# Patient Record
Sex: FEMALE | Race: WHITE | NOT HISPANIC OR LATINO | Employment: OTHER | URBAN - METROPOLITAN AREA
[De-identification: names, ages, dates, MRNs, and addresses within clinical notes are randomized per-mention and may not be internally consistent; named-entity substitution may affect disease eponyms.]

---

## 2017-03-02 RX ORDER — LEVOTHYROXINE SODIUM 0.03 MG/1
25 TABLET ORAL EVERY MORNING
COMMUNITY

## 2017-03-02 RX ORDER — MELOXICAM 7.5 MG/1
7.5 TABLET ORAL AS NEEDED
Status: ON HOLD | COMMUNITY
End: 2017-11-20

## 2017-03-03 ENCOUNTER — ANESTHESIA EVENT (OUTPATIENT)
Dept: GASTROENTEROLOGY | Facility: AMBULARY SURGERY CENTER | Age: 72
End: 2017-03-03
Payer: MEDICARE

## 2017-03-06 ENCOUNTER — ANESTHESIA (OUTPATIENT)
Dept: GASTROENTEROLOGY | Facility: AMBULARY SURGERY CENTER | Age: 72
End: 2017-03-06
Payer: MEDICARE

## 2017-03-06 ENCOUNTER — HOSPITAL ENCOUNTER (OUTPATIENT)
Facility: AMBULARY SURGERY CENTER | Age: 72
Setting detail: OUTPATIENT SURGERY
Discharge: HOME | End: 2017-03-06
Attending: INTERNAL MEDICINE | Admitting: INTERNAL MEDICINE
Payer: MEDICARE

## 2017-03-06 VITALS
WEIGHT: 180 LBS | OXYGEN SATURATION: 98 % | RESPIRATION RATE: 18 BRPM | HEART RATE: 65 BPM | BODY MASS INDEX: 33.13 KG/M2 | SYSTOLIC BLOOD PRESSURE: 140 MMHG | HEIGHT: 62 IN | TEMPERATURE: 98.1 F | DIASTOLIC BLOOD PRESSURE: 71 MMHG

## 2017-03-06 DIAGNOSIS — K21.9 GASTRO-ESOPHAGEAL REFLUX DISEASE WITHOUT ESOPHAGITIS: ICD-10-CM

## 2017-03-06 DIAGNOSIS — Z86.010 HISTORY OF COLONIC POLYPS: ICD-10-CM

## 2017-03-06 PROCEDURE — 88305 TISSUE EXAM BY PATHOLOGIST: CPT | Performed by: INTERNAL MEDICINE

## 2017-03-06 PROCEDURE — 88342 IMHCHEM/IMCYTCHM 1ST ANTB: CPT | Performed by: INTERNAL MEDICINE

## 2017-03-06 RX ORDER — FENTANYL CITRATE 50 UG/ML
INJECTION, SOLUTION INTRAMUSCULAR; INTRAVENOUS AS NEEDED
Status: DISCONTINUED | OUTPATIENT
Start: 2017-03-06 | End: 2017-03-06 | Stop reason: SURG

## 2017-03-06 RX ORDER — SODIUM CHLORIDE, SODIUM LACTATE, POTASSIUM CHLORIDE, CALCIUM CHLORIDE 600; 310; 30; 20 MG/100ML; MG/100ML; MG/100ML; MG/100ML
100 INJECTION, SOLUTION INTRAVENOUS CONTINUOUS
Status: CANCELLED | OUTPATIENT
Start: 2017-03-06

## 2017-03-06 RX ORDER — SODIUM CHLORIDE, SODIUM LACTATE, POTASSIUM CHLORIDE, CALCIUM CHLORIDE 600; 310; 30; 20 MG/100ML; MG/100ML; MG/100ML; MG/100ML
100 INJECTION, SOLUTION INTRAVENOUS CONTINUOUS
Status: DISCONTINUED | OUTPATIENT
Start: 2017-03-06 | End: 2017-03-06 | Stop reason: HOSPADM

## 2017-03-06 RX ORDER — PROPOFOL 10 MG/ML
INJECTION, EMULSION INTRAVENOUS AS NEEDED
Status: DISCONTINUED | OUTPATIENT
Start: 2017-03-06 | End: 2017-03-06 | Stop reason: SURG

## 2017-03-06 RX ADMIN — PROPOFOL 20 MG: 10 INJECTION, EMULSION INTRAVENOUS at 09:38

## 2017-03-06 RX ADMIN — PROPOFOL 20 MG: 10 INJECTION, EMULSION INTRAVENOUS at 09:47

## 2017-03-06 RX ADMIN — PROPOFOL 30 MG: 10 INJECTION, EMULSION INTRAVENOUS at 09:42

## 2017-03-06 RX ADMIN — FENTANYL CITRATE 50 MCG: 50 INJECTION, SOLUTION INTRAMUSCULAR; INTRAVENOUS at 09:47

## 2017-03-06 RX ADMIN — PROPOFOL 30 MG: 10 INJECTION, EMULSION INTRAVENOUS at 09:40

## 2017-03-06 RX ADMIN — LIDOCAINE HYDROCHLORIDE 50 MG: 20 INJECTION, SOLUTION INTRAVENOUS at 09:34

## 2017-03-06 RX ADMIN — FENTANYL CITRATE 50 MCG: 50 INJECTION, SOLUTION INTRAMUSCULAR; INTRAVENOUS at 09:34

## 2017-03-06 RX ADMIN — PROPOFOL 20 MG: 10 INJECTION, EMULSION INTRAVENOUS at 09:36

## 2017-03-06 RX ADMIN — PROPOFOL 20 MG: 10 INJECTION, EMULSION INTRAVENOUS at 09:53

## 2017-03-06 RX ADMIN — PROPOFOL 20 MG: 10 INJECTION, EMULSION INTRAVENOUS at 09:50

## 2017-03-06 RX ADMIN — SODIUM CHLORIDE, SODIUM LACTATE, POTASSIUM CHLORIDE, AND CALCIUM CHLORIDE 100 ML/HR: .6; .31; .03; .02 INJECTION, SOLUTION INTRAVENOUS at 09:04

## 2017-03-06 RX ADMIN — PROPOFOL 80 MG: 10 INJECTION, EMULSION INTRAVENOUS at 09:34

## 2017-03-06 RX ADMIN — PROPOFOL 20 MG: 10 INJECTION, EMULSION INTRAVENOUS at 09:56

## 2017-03-06 RX ADMIN — PROPOFOL 20 MG: 10 INJECTION, EMULSION INTRAVENOUS at 09:44

## 2017-07-10 ENCOUNTER — APPOINTMENT (OUTPATIENT)
Dept: LAB | Facility: HOSPITAL | Age: 72
End: 2017-07-10
Attending: INTERNAL MEDICINE
Payer: MEDICARE

## 2017-07-10 ENCOUNTER — TRANSCRIBE ORDERS (OUTPATIENT)
Dept: ADMINISTRATIVE | Facility: HOSPITAL | Age: 72
End: 2017-07-10

## 2017-07-10 DIAGNOSIS — N39.0 URINARY TRACT INFECTION, SITE NOT SPECIFIED: ICD-10-CM

## 2017-07-10 DIAGNOSIS — E55.9 UNSPECIFIED VITAMIN D DEFICIENCY: ICD-10-CM

## 2017-07-10 DIAGNOSIS — I10 ESSENTIAL HYPERTENSION, MALIGNANT: ICD-10-CM

## 2017-07-10 DIAGNOSIS — E03.9 UNSPECIFIED HYPOTHYROIDISM: ICD-10-CM

## 2017-07-10 DIAGNOSIS — D64.9 ANEMIA, UNSPECIFIED: Primary | ICD-10-CM

## 2017-07-10 DIAGNOSIS — E78.00 PURE HYPERCHOLESTEROLEMIA: ICD-10-CM

## 2017-07-10 DIAGNOSIS — D64.9 ANEMIA, UNSPECIFIED: ICD-10-CM

## 2017-07-10 LAB
25(OH)D3 SERPL-MCNC: 39.8 NG/ML (ref 30–100)
ALBUMIN SERPL BCP-MCNC: 3.4 G/DL (ref 3.5–5)
ALP SERPL-CCNC: 70 U/L (ref 46–116)
ALT SERPL W P-5'-P-CCNC: 21 U/L (ref 12–78)
ANION GAP SERPL CALCULATED.3IONS-SCNC: 8 MMOL/L (ref 4–13)
AST SERPL W P-5'-P-CCNC: 18 U/L (ref 5–45)
BACTERIA UR QL AUTO: ABNORMAL /HPF
BASOPHILS # BLD AUTO: 0 THOUSANDS/ΜL (ref 0–0.1)
BASOPHILS NFR BLD AUTO: 1 % (ref 0–1)
BILIRUB SERPL-MCNC: 0.5 MG/DL (ref 0.2–1)
BILIRUB UR QL STRIP: NEGATIVE
BUN SERPL-MCNC: 25 MG/DL (ref 5–25)
CALCIUM SERPL-MCNC: 8.5 MG/DL (ref 8.3–10.1)
CHLORIDE SERPL-SCNC: 104 MMOL/L (ref 100–108)
CHOLEST SERPL-MCNC: 162 MG/DL (ref 50–200)
CLARITY UR: ABNORMAL
CO2 SERPL-SCNC: 27 MMOL/L (ref 21–32)
COLOR UR: YELLOW
CREAT SERPL-MCNC: 0.99 MG/DL (ref 0.6–1.3)
EOSINOPHIL # BLD AUTO: 0.2 THOUSAND/ΜL (ref 0–0.61)
EOSINOPHIL NFR BLD AUTO: 3 % (ref 0–6)
ERYTHROCYTE [DISTWIDTH] IN BLOOD BY AUTOMATED COUNT: 13.4 % (ref 11.6–15.1)
EST. AVERAGE GLUCOSE BLD GHB EST-MCNC: 120 MG/DL
GFR SERPL CREATININE-BSD FRML MDRD: 55.5 ML/MIN/1.73SQ M
GLUCOSE P FAST SERPL-MCNC: 95 MG/DL (ref 65–99)
GLUCOSE UR STRIP-MCNC: NEGATIVE MG/DL
HBA1C MFR BLD: 5.8 % (ref 4.2–6.3)
HCT VFR BLD AUTO: 43.4 % (ref 37–47)
HDLC SERPL-MCNC: 32 MG/DL (ref 40–60)
HGB BLD-MCNC: 14.7 G/DL (ref 12–16)
HGB UR QL STRIP.AUTO: ABNORMAL
KETONES UR STRIP-MCNC: NEGATIVE MG/DL
LDLC SERPL CALC-MCNC: 108 MG/DL (ref 0–100)
LEUKOCYTE ESTERASE UR QL STRIP: ABNORMAL
LYMPHOCYTES # BLD AUTO: 2.2 THOUSANDS/ΜL (ref 0.6–4.47)
LYMPHOCYTES NFR BLD AUTO: 39 % (ref 14–44)
MCH RBC QN AUTO: 31.4 PG (ref 27–31)
MCHC RBC AUTO-ENTMCNC: 33.9 G/DL (ref 31.4–37.4)
MCV RBC AUTO: 92 FL (ref 82–98)
MONOCYTES # BLD AUTO: 0.5 THOUSAND/ΜL (ref 0.17–1.22)
MONOCYTES NFR BLD AUTO: 9 % (ref 4–12)
MUCOUS THREADS UR QL AUTO: ABNORMAL
NEUTROPHILS # BLD AUTO: 2.7 THOUSANDS/ΜL (ref 1.85–7.62)
NEUTS SEG NFR BLD AUTO: 48 % (ref 43–75)
NITRITE UR QL STRIP: NEGATIVE
NON-SQ EPI CELLS URNS QL MICRO: ABNORMAL /HPF
NRBC BLD AUTO-RTO: 0 /100 WBCS
PH UR STRIP.AUTO: 5 [PH] (ref 5–9)
PLATELET # BLD AUTO: 250 THOUSANDS/UL (ref 130–400)
PMV BLD AUTO: 7.5 FL (ref 8.9–12.7)
POTASSIUM SERPL-SCNC: 3.9 MMOL/L (ref 3.5–5.3)
PROT SERPL-MCNC: 6.5 G/DL (ref 6.4–8.2)
PROT UR STRIP-MCNC: NEGATIVE MG/DL
RBC # BLD AUTO: 4.69 MILLION/UL (ref 4.2–5.4)
RBC #/AREA URNS AUTO: ABNORMAL /HPF
SODIUM SERPL-SCNC: 139 MMOL/L (ref 136–145)
SP GR UR STRIP.AUTO: 1.02 (ref 1–1.03)
TRIGL SERPL-MCNC: 112 MG/DL
TSH SERPL DL<=0.05 MIU/L-ACNC: 2.35 UIU/ML (ref 0.36–3.74)
UROBILINOGEN UR QL STRIP.AUTO: 0.2 E.U./DL
WBC # BLD AUTO: 5.7 THOUSAND/UL (ref 4.8–10.8)
WBC #/AREA URNS AUTO: ABNORMAL /HPF

## 2017-07-10 PROCEDURE — 82306 VITAMIN D 25 HYDROXY: CPT

## 2017-07-10 PROCEDURE — 81001 URINALYSIS AUTO W/SCOPE: CPT | Performed by: INTERNAL MEDICINE

## 2017-07-10 PROCEDURE — 80053 COMPREHEN METABOLIC PANEL: CPT | Performed by: INTERNAL MEDICINE

## 2017-07-10 PROCEDURE — 85025 COMPLETE CBC W/AUTO DIFF WBC: CPT

## 2017-07-10 PROCEDURE — 83036 HEMOGLOBIN GLYCOSYLATED A1C: CPT | Performed by: INTERNAL MEDICINE

## 2017-07-10 PROCEDURE — 84443 ASSAY THYROID STIM HORMONE: CPT | Performed by: INTERNAL MEDICINE

## 2017-07-10 PROCEDURE — 36415 COLL VENOUS BLD VENIPUNCTURE: CPT | Performed by: INTERNAL MEDICINE

## 2017-07-10 PROCEDURE — 80061 LIPID PANEL: CPT | Performed by: INTERNAL MEDICINE

## 2017-09-21 ENCOUNTER — TRANSCRIBE ORDERS (OUTPATIENT)
Dept: ADMINISTRATIVE | Facility: HOSPITAL | Age: 72
End: 2017-09-21

## 2017-09-21 DIAGNOSIS — Z12.31 VISIT FOR SCREENING MAMMOGRAM: Primary | ICD-10-CM

## 2017-09-21 DIAGNOSIS — N39.0 URINARY TRACT INFECTION, SITE NOT SPECIFIED: ICD-10-CM

## 2017-09-22 ENCOUNTER — APPOINTMENT (OUTPATIENT)
Dept: LAB | Facility: HOSPITAL | Age: 72
End: 2017-09-22
Attending: INTERNAL MEDICINE
Payer: MEDICARE

## 2017-09-22 ENCOUNTER — HOSPITAL ENCOUNTER (OUTPATIENT)
Dept: RADIOLOGY | Facility: HOSPITAL | Age: 72
Discharge: HOME | End: 2017-09-22
Attending: INTERNAL MEDICINE
Payer: MEDICARE

## 2017-09-22 DIAGNOSIS — Z12.31 VISIT FOR SCREENING MAMMOGRAM: ICD-10-CM

## 2017-09-22 DIAGNOSIS — N39.0 URINARY TRACT INFECTION, SITE NOT SPECIFIED: ICD-10-CM

## 2017-09-22 LAB
BACTERIA UR QL AUTO: ABNORMAL /HPF
BILIRUB UR QL STRIP: NEGATIVE
CLARITY UR: CLEAR
COLOR UR: YELLOW
GLUCOSE UR STRIP-MCNC: NEGATIVE MG/DL
HGB UR QL STRIP.AUTO: ABNORMAL
KETONES UR STRIP-MCNC: NEGATIVE MG/DL
LEUKOCYTE ESTERASE UR QL STRIP: NEGATIVE
NITRITE UR QL STRIP: NEGATIVE
NON-SQ EPI CELLS URNS QL MICRO: ABNORMAL /HPF
PH UR STRIP.AUTO: 5.5 [PH] (ref 5–9)
PROT UR STRIP-MCNC: NEGATIVE MG/DL
RBC #/AREA URNS AUTO: ABNORMAL /HPF
SP GR UR STRIP.AUTO: 1.02 (ref 1–1.03)
UROBILINOGEN UR QL STRIP.AUTO: 0.2 E.U./DL
WBC #/AREA URNS AUTO: ABNORMAL /HPF

## 2017-09-22 PROCEDURE — G0202 SCR MAMMO BI INCL CAD: HCPCS

## 2017-09-22 PROCEDURE — 81001 URINALYSIS AUTO W/SCOPE: CPT

## 2017-09-27 ENCOUNTER — HOSPITAL ENCOUNTER (OUTPATIENT)
Dept: RADIOLOGY | Facility: HOSPITAL | Age: 72
Discharge: HOME | End: 2017-09-27
Attending: INTERNAL MEDICINE
Payer: MEDICARE

## 2017-09-27 DIAGNOSIS — R92.8 ABNORMAL SCREENING MAMMOGRAM: ICD-10-CM

## 2017-09-27 PROCEDURE — 76642 ULTRASOUND BREAST LIMITED: CPT

## 2017-09-27 PROCEDURE — G0206 DX MAMMO INCL CAD UNI: HCPCS

## 2017-10-23 ENCOUNTER — TRANSCRIBE ORDERS (OUTPATIENT)
Dept: ADMINISTRATIVE | Facility: HOSPITAL | Age: 72
End: 2017-10-23

## 2017-10-23 DIAGNOSIS — M25.561 ACUTE PAIN OF RIGHT KNEE: Primary | ICD-10-CM

## 2017-10-23 DIAGNOSIS — M25.562 ACUTE PAIN OF LEFT KNEE: ICD-10-CM

## 2017-10-31 ENCOUNTER — HOSPITAL ENCOUNTER (OUTPATIENT)
Dept: RADIOLOGY | Facility: HOSPITAL | Age: 72
Discharge: HOME | End: 2017-10-31
Attending: INTERNAL MEDICINE
Payer: MEDICARE

## 2017-10-31 DIAGNOSIS — M25.562 ACUTE PAIN OF LEFT KNEE: ICD-10-CM

## 2017-10-31 PROCEDURE — 73721 MRI JNT OF LWR EXTRE W/O DYE: CPT

## 2017-11-08 ENCOUNTER — ALLSCRIPTS OFFICE VISIT (OUTPATIENT)
Dept: OTHER | Facility: OTHER | Age: 72
End: 2017-11-08

## 2017-11-08 ENCOUNTER — APPOINTMENT (OUTPATIENT)
Dept: RADIOLOGY | Facility: CLINIC | Age: 72
End: 2017-11-08
Payer: MEDICARE

## 2017-11-08 DIAGNOSIS — M17.12 PRIMARY OSTEOARTHRITIS OF LEFT KNEE: ICD-10-CM

## 2017-11-08 DIAGNOSIS — S83.232A COMPLEX TEAR OF MEDIAL MENISCUS OF LEFT KNEE AS CURRENT INJURY: ICD-10-CM

## 2017-11-08 DIAGNOSIS — S83.282A OTHER TEAR OF LATERAL MENISCUS, CURRENT INJURY, LEFT KNEE, INITIAL ENCOUNTER: ICD-10-CM

## 2017-11-08 DIAGNOSIS — M25.562 PAIN IN LEFT KNEE: ICD-10-CM

## 2017-11-08 PROCEDURE — 73562 X-RAY EXAM OF KNEE 3: CPT

## 2017-11-10 NOTE — PROGRESS NOTES
Assessment    1  Left knee pain (719 46) (M25 562)   2  Complex tear of medial meniscus of left knee as current injury, initial encounter (836 0) (D80 120O)   3  Other tear of lateral meniscus of left knee as current injury, initial encounter (836 1) (O52 356I)   4  Primary osteoarthritis of left knee (715 16) (M17 12)    Plan  Complex tear of medial meniscus of left knee as current injury, initial encounter, Leftknee pain, Other tear of lateral meniscus of left knee as current injury, initial encounter,Primary osteoarthritis of left knee    · *1 - [facility]  *  Status: Active - Retrospective By ProtocolAuthorization  Requested for: [id number]  Care Summary provided  : Yes  Complex tear of medial meniscus of left knee as current injury, initial encounter, Primaryosteoarthritis of left knee    · Kenalog 40 MG/ML Injection Suspension (Triamcinolone Acetonide)  Left knee pain    · * XR KNEE 3 VW LEFT NON INJURY; Status:Active; Requested for:08Nov2017;     Discussion/Summary    Madison Burkett is a very pleasant 30-year-old female that presents today with intermittent left knee pain  After thorough history, clinical exam, review of her MRI and x-ray I feel that her activity related left knee pain and her pain complaints her to do more so to her underlying moderate osteoarthritis of her knee rather than her medial and lateral meniscus tears  She denies any pinpoint pain and denies any mechanical symptoms such as locking or catching  We discussed treatment options both surgical and nonsurgical for these complaints and I recommended that she do not undergo surgical arthroscopy as this will not adequately address her pain from underlying osteoarthritis  We discussed that arthroscopy will address mechanical symptoms from meniscal pathology if they are present but I would not recommend this for her due to the type of pain she is experiencing and her underlying osteoarthritis   I recommended that she undergo a steroid injection here in the office today  She tolerated the injection well to verbal consent was obtained  I have also given her hinged knee brace and a prescription to start physical therapy to focus on her left knee  She may continue to take Tylenol and/or Aleve for any breakthrough discomfort she may be having  I did advise her that if she starts to have mechanical symptoms we can reconsider our treatment plan and adjusted as needed otherwise I would like to see her back in 3 months to follow-up on the progress of her therapy in the efficacy of her steroid injection  The patient may call the office at anytime if any questions or concerns should arise  The patient has the current Goals: Decreased left knee pain  Patient is able to Self-Care  Possible side effects of new medications were reviewed with the patient/guardian today  The treatment plan was reviewed with the patient/guardian  The patient/guardian understands and agrees with the treatment plan      Chief Complaint    1  Knee Pain  Left knee pain      History of Present Illness  HPI: Di Thakur is a very pleasant 42-year-old female that presents today for evaluation of four months worth of activity-related left knee pain  She states that she was doing some heavy moving and lifting back in July and at the end of the week of doing so she did feel some left knee pain that was out of the norm for her  She states the pain was sharp and achy in nature that was without mechanical or locking symptoms  She states that over the last few months she has been taking Aleve and her pain is actually improved and is very tolerable  She states that when she does not take Aleve the pain returns and makes it difficult for her to sleep  The pain can reach 7/10 at times  She denies mechanical symptoms such as locking or catching currently  She denies pinpoint pain but states the pain is diffuse through the anterior medial lateral portions of the knee   She has an MRI here for review  Review of Systems   Constitutional: No fever, no chills, feels well, no tiredness, no recent weight gain or loss  Eyes: No complaints of eyesight problems, no red eyes  ENT: no loss of hearing, no nosebleeds, no sore throat  Cardiovascular: No complaints of chest pain, no palpitations, no leg claudication or lower extremity edema  Respiratory: no compliants of shortness of breath, no wheezing, no cough  Gastrointestinal: no complaints of abdominal pain, no constipation, no nausea or diarrhea, no vomiting, no bloody stools  Genitourinary: no complaints of dysuria, no incontinence  Musculoskeletal: no complaints of arthralgia, no myalgia, no joint swelling or stiffness, no limb pain or swelling  Integumentary: no complaints of skin rash or lesion, no itching or dry skin, no skin wounds  Neurological: no complaints of headache, no confusion, no numbness or tingling, no dizziness  Endocrine: No complaints of muscle weakness, no feelings of weakness, no frequent urination, no excessive thirst   Psychiatric: No suicidal thoughts, no anxiety, no feelings of depression  ROS reviewed  Active Problems  1  Left knee pain (719 46) (M25 562)    Past Medical History   · History of hypertension (V12 59) (Z86 79)   · History of thyroid disorder (V12 29) (Z86 39)    The active problems and past medical history were reviewed and updated today  Surgical History   · History of Knee Arthroscopy With Medial Meniscus Repair    The surgical history was reviewed and updated today  Family History  Mother    · Family history of arthritis (V17 7) (Z82 61)    The family history was reviewed and updated today  Social History     · Never a smoker  The social history was reviewed and updated today  Current Meds   1  Aleve TABS; Therapy: (Recorded:08Nov2017) to Recorded   2  Levothyroxine Sodium TABS; Therapy: (Recorded:08Nov2017) to Recorded   3  Metoprolol Tartrate TABS;  Therapy: (Recorded:85Krz8524) to Recorded   4  Pantoprazole Sodium TBEC; Therapy: (13 684 066) to Recorded    The medication list was reviewed and updated today  Allergies  1  No Known Drug Allergies    Vitals  Signs     Heart Rate: 65  Systolic: 896  Diastolic: 70  Height: 5 ft 1 in  Weight: 191 lb   BMI Calculated: 36 09  BSA Calculated: 1 85    Physical Exam  General:  Awake alert orient x3, no acute distress, BMI of 36 09  Left knee:  Skin diffusely intact no acute signs of trauma, no erythema effusion or warmth, range of motion is from 0-120 degrees of flexion, there is parapatellar crepitance appreciated with positive patellofemoral grind test   Positive tenderness palpation over the medial lateral joint line  Knee is stable to varus valgus anterior posterior stability testing, negative Apley negative Marcelo ipsilateral hip without referred pain to the knee on range of motion  Constitutional - General appearance: Normal   Musculoskeletal - Gait and station: Normal -- Muscle strength/tone: Normal -- Lower extremity compartments: Normal   Cardiovascular - Pulses: Normal -- Examination of extremities for edema and/or varicosities: Normal   Skin - Skin and subcutaneous tissue: Normal   Neurologic - Sensation: Normal -- Lower extremity peripheral neuro exam: Normal   Psychiatric - Orientation to person, place, and time: Normal -- Mood and affect: Normal   Eyes  Conjunctiva and lids: Normal    Pupils and irises: Normal        Results/Data  I personally reviewed the films/images/results in the office today  My interpretation follows  X-ray Review X-rays obtained here in the office demonstrate moderate left knee osteoarthritis with joint space narrowing sclerosis and small osteophyte formation  No lytic or blastic lesions, no fracture  MRI Review MRI of the left knee obtained on 10/31/2017 reviewed by me   It demonstrates a complex posterior horn medial meniscus tear and a horizontal anterior lateral meniscus tear with moderate tricompartmental osteoarthritis present  Procedure    Procedure: Injection of the left knee joint  Indication:  Joint pain-- and-- Osteoarthritis  Potential complications include bleeding,-- infection-- and-- allergic reaction  Risk, benefits and alternatives were discussed with the patient  Verbal consent was obtained prior to the procedure  Alcohol and Betadine was used to prep the area  ethyl chloride spray was used as a topical anesthetic  Using sterile technique, the aspiration/injection needle was then directed from a Anterolateral aspect  A 20-gauge was used to inject 4cc of 1% Lidocaine  A bandage was applied  the patient tolerated the procedure well  Complications: none  Patient instructed to avoid strenuous activity for day(s)  After the risks and benefits of the left knee injection were explained, and verbal consent was obtained for the injection  The left knee was prepped using aseptic technique using isopropyl alcohol and betadine solution  The left knee was successfully injected with 6cc of 0 5% marcaine without epinephrine and 2cc of Kenalog 40 suspension using a 20 gauge needle  After the injection, hemostasis was achieved, and a dressing was applied  Post-injection icing and activity modification instructions were provided  The patient tolerated the procedure well  Future Appointments    Date/Time Provider Specialty Site   02/07/2018 01:45 PM Trey Escobar DO Orthopedic Surgery [facility]       Signatures   Electronically signed by :  Rere Latif DO; Nov 8 2017  1:09PM EST                       (Author)

## 2017-11-20 ENCOUNTER — ANESTHESIA EVENT (OUTPATIENT)
Dept: GASTROENTEROLOGY | Facility: AMBULARY SURGERY CENTER | Age: 72
End: 2017-11-20
Payer: MEDICARE

## 2017-11-20 ENCOUNTER — HOSPITAL ENCOUNTER (OUTPATIENT)
Facility: AMBULARY SURGERY CENTER | Age: 72
Setting detail: OUTPATIENT SURGERY
Discharge: HOME | End: 2017-11-20
Attending: INTERNAL MEDICINE | Admitting: INTERNAL MEDICINE
Payer: MEDICARE

## 2017-11-20 VITALS
OXYGEN SATURATION: 96 % | TEMPERATURE: 98.6 F | SYSTOLIC BLOOD PRESSURE: 121 MMHG | HEART RATE: 61 BPM | DIASTOLIC BLOOD PRESSURE: 74 MMHG | RESPIRATION RATE: 18 BRPM

## 2017-11-20 DIAGNOSIS — K20.90 ESOPHAGITIS: ICD-10-CM

## 2017-11-20 DIAGNOSIS — K21.9 GASTRO-ESOPHAGEAL REFLUX DISEASE WITHOUT ESOPHAGITIS: ICD-10-CM

## 2017-11-20 PROCEDURE — 88342 IMHCHEM/IMCYTCHM 1ST ANTB: CPT | Performed by: INTERNAL MEDICINE

## 2017-11-20 PROCEDURE — 88305 TISSUE EXAM BY PATHOLOGIST: CPT | Performed by: INTERNAL MEDICINE

## 2017-11-20 RX ORDER — PROPOFOL 10 MG/ML
INJECTION, EMULSION INTRAVENOUS AS NEEDED
Status: DISCONTINUED | OUTPATIENT
Start: 2017-11-20 | End: 2017-11-20 | Stop reason: SURG

## 2017-11-20 RX ORDER — SODIUM CHLORIDE 9 MG/ML
125 INJECTION, SOLUTION INTRAVENOUS CONTINUOUS
Status: DISCONTINUED | OUTPATIENT
Start: 2017-11-20 | End: 2017-11-20 | Stop reason: HOSPADM

## 2017-11-20 RX ORDER — PANTOPRAZOLE SODIUM 20 MG/1
20 TABLET, DELAYED RELEASE ORAL DAILY
COMMUNITY
End: 2018-06-15

## 2017-11-20 RX ADMIN — SODIUM CHLORIDE: 0.9 INJECTION, SOLUTION INTRAVENOUS at 12:50

## 2017-11-20 RX ADMIN — PROPOFOL 100 MG: 10 INJECTION, EMULSION INTRAVENOUS at 13:04

## 2017-11-20 RX ADMIN — PROPOFOL 20 MG: 10 INJECTION, EMULSION INTRAVENOUS at 13:06

## 2017-11-20 RX ADMIN — PROPOFOL 20 MG: 10 INJECTION, EMULSION INTRAVENOUS at 13:09

## 2017-11-20 RX ADMIN — PROPOFOL 20 MG: 10 INJECTION, EMULSION INTRAVENOUS at 13:11

## 2017-11-20 NOTE — ANESTHESIA PREPROCEDURE EVALUATION
Review of Systems/Medical History  Patient summary reviewed  Chart reviewed  History of anesthetic complications     Cardiovascular  Hypertension ,    Pulmonary       GI/Hepatic            Endo/Other  History of thyroid disease , hypothyroidism,      GYN       Hematology   Musculoskeletal       Neurology   Psychology           Physical Exam    Airway    Mallampati score: II  TM Distance: >3 FB  Neck ROM: full     Dental   No notable dental hx     Cardiovascular  Cardiovascular exam normal    Pulmonary  Pulmonary exam normal     Other Findings        Anesthesia Plan  ASA Score- 3       Anesthesia Type- IV sedation with anesthesia with ASA Monitors  Additional Monitors:   Airway Plan:           Induction-       Informed Consent- Anesthetic plan and risks discussed with patient

## 2017-11-20 NOTE — H&P
Physician Requesting Consult: Mami Hollis MD       Reason for Consult / Principal Problem:  History of esophagitis      HPI:  79-year-old lady early this year had significant esophagitis also some early stenosis she is here to recheck to ensure healing also check for underlying Moncada's  Allergies: No Known Allergies    Medications:  Current Facility-Administered Medications:     sodium chloride 0 9 % infusion, 125 mL/hr, Intravenous, Continuous, Alexis Dobbins MD    Past Medical history:  Past Medical History:   Diagnosis Date    Disease of thyroid gland     Hypertension        Past Surgical History:   Past Surgical History:   Procedure Laterality Date    BREAST SURGERY Left     x2 bx-benign     SECTION  1966    x1    COLONOSCOPY      age 74    COLONOSCOPY N/A 3/6/2017    Procedure: COLONOSCOPY;  Surgeon: Dana Justice MD;  Location: [facility] GI LAB; Service:     ESOPHAGOGASTRODUODENOSCOPY N/A 3/6/2017    Procedure: ESOPHAGOGASTRODUODENOSCOPY (EGD); Surgeon: Dana Justice MD;  Location[facility]; Service:     HERNIA REPAIR      incisional hernia right side    HYSTERECTOMY      partial- ovaries remain    KNEE ARTHROSCOPY Left     torn meniscus    LIPOMA RESECTION      lower right abdomen    ROTATOR CUFF REPAIR Left     SHOULDER ARTHROSCOPY W/ ROTATOR CUFF REPAIR Left     TRIGGER FINGER RELEASE Right     thumb       Social history:  Reviewed see chart    Review of Systems: Otherwise multiple systems reviewed and/or noncontributory- see chart    Physical Exam:  Vital signs stable afebrile alert oriented x3 regular rate rhythm clear to auscultation abdomen benign    Lab Results: No results found for this or any previous visit (from the past 24 hour(s))  Imaging Studies: Xr Knee 3 Vw Left Non Injury    Result Date: 2017  Narrative: LEFT KNEE INDICATION:  Knee pain    History of torn meniscus COMPARISON: None VIEWS:  3 IMAGES:  3 FINDINGS: There is no acute fracture or dislocation  There is no joint effusion  Minimal joint space narrowing seen in the medial and patellofemoral compartments  No lytic or blastic lesions are seen  Soft tissues are unremarkable  Impression: Mild osteoarthritis left knee Workstation performed: [id number]     Mri Knee Left  Wo Contrast    Result Date: 11/1/2017  Narrative: MRI LEFT KNEE INDICATION:  M25 562: Pain in left knee  History taken directly from the electronic ordering system  COMPARISON: None  TECHNIQUE:    MR sequences were obtained of the left knee including:  Localizer, axial T2 fat sat, coronal T1/T2 fat sat, sagittal PD/T2 fat sat  Images were acquired on a 1 5 Carla unit  Gadolinium was not used  FINDINGS: SUBCUTANEOUS TISSUES: Normal JOINT EFFUSION: None  BAKER'S CYST: None  MENISCI: Large complex tear of the medial meniscus posterior meniscal root and posterior horn (series 4 images 13 through 18 ) Small horizontal superior articular surface tear of the anterior horn of the lateral meniscus (series 4 images 7 and 8 ) CRUCIATE LIGAMENTS: Intact  EXTENSOR APPARATUS: Intact  COLLATERAL LIGAMENTS: Intact  ARTICULAR SURFACES: There is moderate medial tibiofemoral compartment osteoarthritis and patellofemoral compartment osteoarthritis  BONE MARROW: Normal  MUSCULATURE:  Intact  Impression: Large complex tear of the medial meniscus posterior meniscal root and posterior horn (series 4 images 13 through 18 ) Small horizontal superior articular surface tear of the anterior horn of the lateral meniscus (series 4 images 7 and 8 ) Moderate medial tibiofemoral compartment and patellofemoral compartment osteoarthritis   Workstation performed: [id number]       Assessment:  As above    Plan:  Upper endoscopy today    Unique Pal MD

## 2017-11-20 NOTE — DISCHARGE INSTRUCTIONS
You have a hiatal hernia the bed esophagitis has resolved there is early stricture in your esophagus  We did biopsies of the esophagus as well as the stomach  Follow up in the office we can discuss potential procedures for reflux

## 2017-11-21 ENCOUNTER — GENERIC CONVERSION - ENCOUNTER (OUTPATIENT)
Dept: OTHER | Facility: OTHER | Age: 72
End: 2017-11-21

## 2017-11-21 ENCOUNTER — APPOINTMENT (OUTPATIENT)
Dept: PHYSICAL THERAPY | Facility: CLINIC | Age: 72
End: 2017-11-21
Payer: MEDICARE

## 2017-11-21 DIAGNOSIS — M17.12 PRIMARY OSTEOARTHRITIS OF LEFT KNEE: ICD-10-CM

## 2017-11-21 DIAGNOSIS — S83.282A OTHER TEAR OF LATERAL MENISCUS, CURRENT INJURY, LEFT KNEE, INITIAL ENCOUNTER: ICD-10-CM

## 2017-11-21 DIAGNOSIS — M25.562 PAIN IN LEFT KNEE: ICD-10-CM

## 2017-11-21 DIAGNOSIS — S83.232A COMPLEX TEAR OF MEDIAL MENISCUS OF LEFT KNEE AS CURRENT INJURY: ICD-10-CM

## 2017-11-21 PROCEDURE — G8979 MOBILITY GOAL STATUS: HCPCS

## 2017-11-21 PROCEDURE — 97161 PT EVAL LOW COMPLEX 20 MIN: CPT

## 2017-11-21 PROCEDURE — G8978 MOBILITY CURRENT STATUS: HCPCS

## 2017-11-27 ENCOUNTER — APPOINTMENT (OUTPATIENT)
Dept: PHYSICAL THERAPY | Facility: CLINIC | Age: 72
End: 2017-11-27
Payer: MEDICARE

## 2017-11-27 PROCEDURE — 97110 THERAPEUTIC EXERCISES: CPT

## 2017-11-27 PROCEDURE — 97140 MANUAL THERAPY 1/> REGIONS: CPT

## 2017-11-30 ENCOUNTER — APPOINTMENT (OUTPATIENT)
Dept: PHYSICAL THERAPY | Facility: CLINIC | Age: 72
End: 2017-11-30
Payer: MEDICARE

## 2017-11-30 PROCEDURE — 97110 THERAPEUTIC EXERCISES: CPT

## 2017-11-30 PROCEDURE — 97140 MANUAL THERAPY 1/> REGIONS: CPT

## 2017-12-06 ENCOUNTER — APPOINTMENT (OUTPATIENT)
Dept: PHYSICAL THERAPY | Facility: CLINIC | Age: 72
End: 2017-12-06
Payer: MEDICARE

## 2017-12-06 PROCEDURE — 97110 THERAPEUTIC EXERCISES: CPT

## 2017-12-06 PROCEDURE — 97140 MANUAL THERAPY 1/> REGIONS: CPT

## 2017-12-08 ENCOUNTER — APPOINTMENT (OUTPATIENT)
Dept: PHYSICAL THERAPY | Facility: CLINIC | Age: 72
End: 2017-12-08
Payer: MEDICARE

## 2017-12-08 PROCEDURE — 97110 THERAPEUTIC EXERCISES: CPT

## 2017-12-08 PROCEDURE — 97140 MANUAL THERAPY 1/> REGIONS: CPT

## 2017-12-11 ENCOUNTER — APPOINTMENT (OUTPATIENT)
Dept: PHYSICAL THERAPY | Facility: CLINIC | Age: 72
End: 2017-12-11
Payer: MEDICARE

## 2017-12-11 PROCEDURE — G8979 MOBILITY GOAL STATUS: HCPCS

## 2017-12-11 PROCEDURE — 97110 THERAPEUTIC EXERCISES: CPT

## 2017-12-11 PROCEDURE — 97140 MANUAL THERAPY 1/> REGIONS: CPT

## 2017-12-11 PROCEDURE — G8980 MOBILITY D/C STATUS: HCPCS

## 2017-12-13 ENCOUNTER — APPOINTMENT (OUTPATIENT)
Dept: PHYSICAL THERAPY | Facility: CLINIC | Age: 72
End: 2017-12-13
Payer: MEDICARE

## 2017-12-18 ENCOUNTER — APPOINTMENT (OUTPATIENT)
Dept: PHYSICAL THERAPY | Facility: CLINIC | Age: 72
End: 2017-12-18
Payer: MEDICARE

## 2017-12-20 ENCOUNTER — APPOINTMENT (OUTPATIENT)
Dept: PHYSICAL THERAPY | Facility: CLINIC | Age: 72
End: 2017-12-20
Payer: MEDICARE

## 2018-01-14 VITALS
BODY MASS INDEX: 36.06 KG/M2 | DIASTOLIC BLOOD PRESSURE: 70 MMHG | HEIGHT: 61 IN | WEIGHT: 191 LBS | HEART RATE: 65 BPM | SYSTOLIC BLOOD PRESSURE: 168 MMHG

## 2018-01-15 ENCOUNTER — TRANSCRIBE ORDERS (OUTPATIENT)
Dept: ADMINISTRATIVE | Facility: HOSPITAL | Age: 73
End: 2018-01-15

## 2018-01-15 ENCOUNTER — APPOINTMENT (OUTPATIENT)
Dept: LAB | Facility: HOSPITAL | Age: 73
End: 2018-01-15
Attending: INTERNAL MEDICINE
Payer: MEDICARE

## 2018-01-15 DIAGNOSIS — E03.9 MYXEDEMA HEART DISEASE: ICD-10-CM

## 2018-01-15 DIAGNOSIS — E78.00 PURE HYPERCHOLESTEROLEMIA: ICD-10-CM

## 2018-01-15 DIAGNOSIS — E55.9 VITAMIN D DEFICIENCY: ICD-10-CM

## 2018-01-15 DIAGNOSIS — D64.9 ANEMIA, UNSPECIFIED TYPE: Primary | ICD-10-CM

## 2018-01-15 DIAGNOSIS — I10 ESSENTIAL HYPERTENSION, MALIGNANT: ICD-10-CM

## 2018-01-15 DIAGNOSIS — I51.9 MYXEDEMA HEART DISEASE: ICD-10-CM

## 2018-01-15 DIAGNOSIS — D64.9 ANEMIA, UNSPECIFIED TYPE: ICD-10-CM

## 2018-01-15 LAB
25(OH)D3 SERPL-MCNC: 31.9 NG/ML (ref 30–100)
ALBUMIN SERPL BCP-MCNC: 3.4 G/DL (ref 3.5–5)
ALP SERPL-CCNC: 70 U/L (ref 46–116)
ALT SERPL W P-5'-P-CCNC: 23 U/L (ref 12–78)
ANION GAP SERPL CALCULATED.3IONS-SCNC: 6 MMOL/L (ref 4–13)
AST SERPL W P-5'-P-CCNC: 14 U/L (ref 5–45)
BACTERIA UR QL AUTO: ABNORMAL /HPF
BASOPHILS # BLD AUTO: 0 THOUSANDS/ΜL (ref 0–0.1)
BASOPHILS NFR BLD AUTO: 1 % (ref 0–1)
BILIRUB SERPL-MCNC: 0.5 MG/DL (ref 0.2–1)
BILIRUB UR QL STRIP: NEGATIVE
BUN SERPL-MCNC: 20 MG/DL (ref 5–25)
CALCIUM SERPL-MCNC: 8.9 MG/DL (ref 8.3–10.1)
CHLORIDE SERPL-SCNC: 103 MMOL/L (ref 100–108)
CHOLEST SERPL-MCNC: 186 MG/DL (ref 50–200)
CLARITY UR: CLEAR
CO2 SERPL-SCNC: 32 MMOL/L (ref 21–32)
COLOR UR: YELLOW
CREAT SERPL-MCNC: 0.98 MG/DL (ref 0.6–1.3)
EOSINOPHIL # BLD AUTO: 0.1 THOUSAND/ΜL (ref 0–0.61)
EOSINOPHIL NFR BLD AUTO: 3 % (ref 0–6)
ERYTHROCYTE [DISTWIDTH] IN BLOOD BY AUTOMATED COUNT: 13.6 % (ref 11.6–15.1)
EST. AVERAGE GLUCOSE BLD GHB EST-MCNC: 120 MG/DL
GFR SERPL CREATININE-BSD FRML MDRD: 59 ML/MIN/1.73SQ M
GLUCOSE P FAST SERPL-MCNC: 88 MG/DL (ref 65–99)
GLUCOSE UR STRIP-MCNC: NEGATIVE MG/DL
HBA1C MFR BLD: 5.8 % (ref 4.2–6.3)
HCT VFR BLD AUTO: 45.7 % (ref 37–47)
HDLC SERPL-MCNC: 38 MG/DL (ref 40–60)
HGB BLD-MCNC: 15.3 G/DL (ref 12–16)
HGB UR QL STRIP.AUTO: ABNORMAL
KETONES UR STRIP-MCNC: NEGATIVE MG/DL
LDLC SERPL CALC-MCNC: 125 MG/DL (ref 0–100)
LEUKOCYTE ESTERASE UR QL STRIP: ABNORMAL
LYMPHOCYTES # BLD AUTO: 2.2 THOUSANDS/ΜL (ref 0.6–4.47)
LYMPHOCYTES NFR BLD AUTO: 37 % (ref 14–44)
MCH RBC QN AUTO: 31.5 PG (ref 27–31)
MCHC RBC AUTO-ENTMCNC: 33.5 G/DL (ref 31.4–37.4)
MCV RBC AUTO: 94 FL (ref 82–98)
MONOCYTES # BLD AUTO: 0.6 THOUSAND/ΜL (ref 0.17–1.22)
MONOCYTES NFR BLD AUTO: 11 % (ref 4–12)
NEUTROPHILS # BLD AUTO: 2.8 THOUSANDS/ΜL (ref 1.85–7.62)
NEUTS SEG NFR BLD AUTO: 49 % (ref 43–75)
NITRITE UR QL STRIP: NEGATIVE
NON-SQ EPI CELLS URNS QL MICRO: ABNORMAL /HPF
NRBC BLD AUTO-RTO: 0 /100 WBCS
PH UR STRIP.AUTO: 6 [PH] (ref 5–9)
PLATELET # BLD AUTO: 301 THOUSANDS/UL (ref 130–400)
PMV BLD AUTO: 7.8 FL (ref 8.9–12.7)
POTASSIUM SERPL-SCNC: 4 MMOL/L (ref 3.5–5.3)
PROT SERPL-MCNC: 6.7 G/DL (ref 6.4–8.2)
PROT UR STRIP-MCNC: NEGATIVE MG/DL
RBC # BLD AUTO: 4.86 MILLION/UL (ref 4.2–5.4)
RBC #/AREA URNS AUTO: ABNORMAL /HPF
SODIUM SERPL-SCNC: 141 MMOL/L (ref 136–145)
SP GR UR STRIP.AUTO: 1.01 (ref 1–1.03)
T4 FREE SERPL-MCNC: 1.13 NG/DL (ref 0.76–1.46)
TRIGL SERPL-MCNC: 117 MG/DL
TSH SERPL DL<=0.05 MIU/L-ACNC: 3.88 UIU/ML (ref 0.36–3.74)
UROBILINOGEN UR QL STRIP.AUTO: 0.2 E.U./DL
WBC # BLD AUTO: 5.8 THOUSAND/UL (ref 4.8–10.8)
WBC #/AREA URNS AUTO: ABNORMAL /HPF

## 2018-01-15 PROCEDURE — 83036 HEMOGLOBIN GLYCOSYLATED A1C: CPT | Performed by: INTERNAL MEDICINE

## 2018-01-15 PROCEDURE — 36415 COLL VENOUS BLD VENIPUNCTURE: CPT | Performed by: INTERNAL MEDICINE

## 2018-01-15 PROCEDURE — 84439 ASSAY OF FREE THYROXINE: CPT

## 2018-01-15 PROCEDURE — 85025 COMPLETE CBC W/AUTO DIFF WBC: CPT | Performed by: INTERNAL MEDICINE

## 2018-01-15 PROCEDURE — 80061 LIPID PANEL: CPT

## 2018-01-15 PROCEDURE — 80053 COMPREHEN METABOLIC PANEL: CPT | Performed by: INTERNAL MEDICINE

## 2018-01-15 PROCEDURE — 81001 URINALYSIS AUTO W/SCOPE: CPT | Performed by: INTERNAL MEDICINE

## 2018-01-15 PROCEDURE — 84443 ASSAY THYROID STIM HORMONE: CPT

## 2018-01-15 PROCEDURE — 82306 VITAMIN D 25 HYDROXY: CPT

## 2018-01-16 NOTE — MISCELLANEOUS
Message  Return to work or school:   Kendall Lu is under my professional care  She was seen in my office on NOVEMBER 8, 2017 and is cleared to return to work  Any questions please call our office  LIZANDRO SKINNER DO  Signatures   Electronically signed by :  Mane Yu DO; Nov 8 2017  2:25PM EST                       (Author)

## 2018-06-13 ENCOUNTER — TRANSCRIBE ORDERS (OUTPATIENT)
Dept: ADMINISTRATIVE | Facility: HOSPITAL | Age: 73
End: 2018-06-13

## 2018-06-13 ENCOUNTER — APPOINTMENT (OUTPATIENT)
Dept: LAB | Facility: HOSPITAL | Age: 73
End: 2018-06-13
Attending: INTERNAL MEDICINE
Payer: MEDICARE

## 2018-06-13 DIAGNOSIS — E03.9 MYXEDEMA HEART DISEASE: ICD-10-CM

## 2018-06-13 DIAGNOSIS — E78.00 PURE HYPERCHOLESTEROLEMIA: ICD-10-CM

## 2018-06-13 DIAGNOSIS — Z79.4 OTHER SPECIFIED DIABETES MELLITUS WITHOUT COMPLICATION, WITH LONG-TERM CURRENT USE OF INSULIN (HCC): ICD-10-CM

## 2018-06-13 DIAGNOSIS — E13.9 OTHER SPECIFIED DIABETES MELLITUS WITHOUT COMPLICATION, WITH LONG-TERM CURRENT USE OF INSULIN (HCC): ICD-10-CM

## 2018-06-13 DIAGNOSIS — I10 ESSENTIAL HYPERTENSION, MALIGNANT: ICD-10-CM

## 2018-06-13 DIAGNOSIS — N39.0 URINARY TRACT INFECTION WITHOUT HEMATURIA, SITE UNSPECIFIED: ICD-10-CM

## 2018-06-13 DIAGNOSIS — E55.9 VITAMIN D DEFICIENCY: ICD-10-CM

## 2018-06-13 DIAGNOSIS — I51.9 MYXEDEMA HEART DISEASE: ICD-10-CM

## 2018-06-13 DIAGNOSIS — D64.9 ANEMIA, UNSPECIFIED TYPE: Primary | ICD-10-CM

## 2018-06-13 DIAGNOSIS — D64.9 ANEMIA, UNSPECIFIED TYPE: ICD-10-CM

## 2018-06-13 LAB
ALBUMIN SERPL BCP-MCNC: 3.1 G/DL (ref 3.5–5)
ALP SERPL-CCNC: 63 U/L (ref 46–116)
ALT SERPL W P-5'-P-CCNC: 19 U/L (ref 12–78)
ANION GAP SERPL CALCULATED.3IONS-SCNC: 5 MMOL/L (ref 4–13)
AST SERPL W P-5'-P-CCNC: 12 U/L (ref 5–45)
BACTERIA UR QL AUTO: ABNORMAL /HPF
BILIRUB SERPL-MCNC: 0.4 MG/DL (ref 0.2–1)
BILIRUB UR QL STRIP: NEGATIVE
BUN SERPL-MCNC: 22 MG/DL (ref 5–25)
CALCIUM SERPL-MCNC: 8.2 MG/DL (ref 8.3–10.1)
CHLORIDE SERPL-SCNC: 104 MMOL/L (ref 100–108)
CHOLEST SERPL-MCNC: 181 MG/DL (ref 50–200)
CLARITY UR: CLEAR
CO2 SERPL-SCNC: 29 MMOL/L (ref 21–32)
COLOR UR: YELLOW
CREAT SERPL-MCNC: 1.04 MG/DL (ref 0.6–1.3)
EST. AVERAGE GLUCOSE BLD GHB EST-MCNC: 117 MG/DL
GFR SERPL CREATININE-BSD FRML MDRD: 54 ML/MIN/1.73SQ M
GLUCOSE P FAST SERPL-MCNC: 90 MG/DL (ref 65–99)
GLUCOSE UR STRIP-MCNC: NEGATIVE MG/DL
HBA1C MFR BLD: 5.7 % (ref 4.2–6.3)
HDLC SERPL-MCNC: 35 MG/DL (ref 40–60)
HGB UR QL STRIP.AUTO: ABNORMAL
KETONES UR STRIP-MCNC: NEGATIVE MG/DL
LDLC SERPL CALC-MCNC: 124 MG/DL (ref 0–100)
LEUKOCYTE ESTERASE UR QL STRIP: ABNORMAL
NITRITE UR QL STRIP: NEGATIVE
NON-SQ EPI CELLS URNS QL MICRO: ABNORMAL /HPF
NONHDLC SERPL-MCNC: 146 MG/DL
PH UR STRIP.AUTO: 5.5 [PH] (ref 5–9)
POTASSIUM SERPL-SCNC: 3.9 MMOL/L (ref 3.5–5.3)
PROT SERPL-MCNC: 6.4 G/DL (ref 6.4–8.2)
PROT UR STRIP-MCNC: NEGATIVE MG/DL
RBC #/AREA URNS AUTO: ABNORMAL /HPF
SODIUM SERPL-SCNC: 138 MMOL/L (ref 136–145)
SP GR UR STRIP.AUTO: 1.01 (ref 1–1.03)
TRIGL SERPL-MCNC: 108 MG/DL
TSH SERPL DL<=0.05 MIU/L-ACNC: 4.03 UIU/ML (ref 0.36–3.74)
UROBILINOGEN UR QL STRIP.AUTO: 0.2 E.U./DL
WBC #/AREA URNS AUTO: ABNORMAL /HPF

## 2018-06-13 PROCEDURE — 36415 COLL VENOUS BLD VENIPUNCTURE: CPT | Performed by: INTERNAL MEDICINE

## 2018-06-13 PROCEDURE — 87086 URINE CULTURE/COLONY COUNT: CPT

## 2018-06-13 PROCEDURE — 80053 COMPREHEN METABOLIC PANEL: CPT | Performed by: INTERNAL MEDICINE

## 2018-06-13 PROCEDURE — 83036 HEMOGLOBIN GLYCOSYLATED A1C: CPT | Performed by: INTERNAL MEDICINE

## 2018-06-13 PROCEDURE — 80061 LIPID PANEL: CPT

## 2018-06-13 PROCEDURE — 84443 ASSAY THYROID STIM HORMONE: CPT

## 2018-06-13 PROCEDURE — 81001 URINALYSIS AUTO W/SCOPE: CPT | Performed by: INTERNAL MEDICINE

## 2018-06-14 LAB — BACTERIA UR CULT: NORMAL

## 2018-06-15 RX ORDER — RANITIDINE 300 MG/1
300 TABLET ORAL
COMMUNITY
End: 2022-04-06

## 2018-06-15 NOTE — PRE-PROCEDURE INSTRUCTIONS
Pre-Surgery Instructions:   Medication Instructions    Cholecalciferol (VITAMIN D PO) Patient was instructed by Physician and understands   levothyroxine 25 mcg tablet Patient was instructed by Physician and understands   metoprolol tartrate (LOPRESSOR) 25 mg tablet Patient was instructed by Physician and understands   ranitidine (ZANTAC) 300 MG tablet Patient was instructed by Physician and understands  Pt to follow Dr Osorio's instructions   Celeste Cunningham

## 2018-06-17 ENCOUNTER — ANESTHESIA EVENT (OUTPATIENT)
Dept: GASTROENTEROLOGY | Facility: AMBULARY SURGERY CENTER | Age: 73
End: 2018-06-17
Payer: MEDICARE

## 2018-06-17 NOTE — ANESTHESIA PREPROCEDURE EVALUATION
Hypertension    Disease of thyroid gland    Wears glasses    GERD (gastroesophageal reflux disease)    Arthritis left knee injection-2017   Seasonal allergies        Review of Systems/Medical History  Patient summary reviewed  Chart reviewed      Cardiovascular  Hypertension ,    Pulmonary       GI/Hepatic    GERD ,             Endo/Other  History of thyroid disease ,   Obesity    GYN       Hematology   Musculoskeletal    Arthritis     Neurology   Psychology           Physical Exam    Airway    Mallampati score: II  TM Distance: >3 FB  Neck ROM: full     Dental       Cardiovascular  Rhythm: regular, Rate: normal,     Pulmonary  Breath sounds clear to auscultation,     Other Findings        Anesthesia Plan  ASA Score- 2     Anesthesia Type- IV sedation with anesthesia with ASA Monitors  Additional Monitors:   Airway Plan:         Plan Factors-    Induction- intravenous  Postoperative Plan-     Informed Consent- Anesthetic plan and risks discussed with patient

## 2018-06-18 ENCOUNTER — HOSPITAL ENCOUNTER (OUTPATIENT)
Facility: AMBULARY SURGERY CENTER | Age: 73
Setting detail: OUTPATIENT SURGERY
Discharge: HOME | End: 2018-06-18
Attending: INTERNAL MEDICINE | Admitting: INTERNAL MEDICINE
Payer: MEDICARE

## 2018-06-18 ENCOUNTER — ANESTHESIA (OUTPATIENT)
Dept: GASTROENTEROLOGY | Facility: AMBULARY SURGERY CENTER | Age: 73
End: 2018-06-18
Payer: MEDICARE

## 2018-06-18 VITALS
WEIGHT: 190 LBS | BODY MASS INDEX: 35.9 KG/M2 | OXYGEN SATURATION: 98 % | HEART RATE: 54 BPM | RESPIRATION RATE: 18 BRPM | TEMPERATURE: 97.2 F | SYSTOLIC BLOOD PRESSURE: 132 MMHG | DIASTOLIC BLOOD PRESSURE: 56 MMHG

## 2018-06-18 DIAGNOSIS — K20.90 ESOPHAGITIS: ICD-10-CM

## 2018-06-18 PROCEDURE — 88305 TISSUE EXAM BY PATHOLOGIST: CPT | Performed by: PATHOLOGY

## 2018-06-18 RX ORDER — FENTANYL CITRATE/PF 50 MCG/ML
25 SYRINGE (ML) INJECTION
Status: DISCONTINUED | OUTPATIENT
Start: 2018-06-18 | End: 2018-06-18 | Stop reason: HOSPADM

## 2018-06-18 RX ORDER — ONDANSETRON 2 MG/ML
4 INJECTION INTRAMUSCULAR; INTRAVENOUS ONCE AS NEEDED
Status: DISCONTINUED | OUTPATIENT
Start: 2018-06-18 | End: 2018-06-18 | Stop reason: HOSPADM

## 2018-06-18 RX ORDER — SODIUM CHLORIDE, SODIUM LACTATE, POTASSIUM CHLORIDE, CALCIUM CHLORIDE 600; 310; 30; 20 MG/100ML; MG/100ML; MG/100ML; MG/100ML
75 INJECTION, SOLUTION INTRAVENOUS CONTINUOUS
Status: DISCONTINUED | OUTPATIENT
Start: 2018-06-18 | End: 2018-06-18 | Stop reason: HOSPADM

## 2018-06-18 RX ORDER — PROPOFOL 10 MG/ML
INJECTION, EMULSION INTRAVENOUS AS NEEDED
Status: DISCONTINUED | OUTPATIENT
Start: 2018-06-18 | End: 2018-06-18 | Stop reason: SURG

## 2018-06-18 RX ADMIN — SODIUM CHLORIDE, SODIUM LACTATE, POTASSIUM CHLORIDE, AND CALCIUM CHLORIDE 75 ML/HR: .6; .31; .03; .02 INJECTION, SOLUTION INTRAVENOUS at 07:28

## 2018-06-18 RX ADMIN — PROPOFOL 150 MG: 10 INJECTION, EMULSION INTRAVENOUS at 08:11

## 2018-06-18 NOTE — H&P
Physician Requesting Consult: Tootie Patel MD     Reason for Consult / Principal Problem:  History of severe reflux occasional dysphagia      HPI:  79-year-old lady history of severe reflux she has intermittent dysphagia if she eats too fast she has stenosis of the distal esophagus here for EGD possible dilatation    Allergies: No Known Allergies    Medications:  Current Facility-Administered Medications:     lactated ringers infusion, 75 mL/hr, Intravenous, Continuous, Chalino Rivera MD, Last Rate: 75 mL/hr at 18 0728, 75 mL/hr at 18 2845    Past Medical history:  Past Medical History:   Diagnosis Date    Arthritis     left knee injection-    Disease of thyroid gland     GERD (gastroesophageal reflux disease)     Hypertension     Seasonal allergies     Wears glasses        Past Surgical History:   Past Surgical History:   Procedure Laterality Date    BREAST SURGERY Left     x2 bx-benign     SECTION  1966    x1    COLONOSCOPY      age 74    COLONOSCOPY N/A 3/6/2017    Procedure: COLONOSCOPY;  Surgeon: Arsen Ramirez MD;  Location: [facility]; Service:     ESOPHAGOGASTRODUODENOSCOPY N/A 3/6/2017    Procedure: ESOPHAGOGASTRODUODENOSCOPY (EGD); Surgeon: Arsen Ramirez MD;  Location: [facility] GI LAB; Service:     ESOPHAGOGASTRODUODENOSCOPY N/A 2017    Procedure: ESOPHAGOGASTRODUODENOSCOPY (EGD); Surgeon: Arsen Ramirez MD;  Location: [facility] GI LAB;   Service: Gastroenterology    HERNIA REPAIR      incisional hernia right side    HYSTERECTOMY      partial- ovaries remain    KNEE ARTHROSCOPY Left     torn meniscus    LIPOMA RESECTION      lower right abdomen    ROTATOR CUFF REPAIR Left     SHOULDER ARTHROSCOPY W/ ROTATOR CUFF REPAIR Left     TRIGGER FINGER RELEASE Right     thumb       Social history:  Reviewed see chart    Review of Systems: Otherwise multiple systems reviewed and/or noncontributory- see chart    Physical Exam:  Vital signs stable afebrile alert oriented x3 regular rate rhythm clear to auscultation abdomen benign    Lab Results: No results found for this or any previous visit (from the past 24 hour(s))  Imaging Studies: No results found      Assessment:  As above    Plan:  EGD today    Deann Marrero MD

## 2018-06-18 NOTE — OP NOTE
ESOPHAGOGASTRODUODENOSCOPY (EGD)  Procedure Note    Yas Smith  6/18/2018    Procedure:  EGD with biopsy    Pre-op Diagnosis:  History of severe esophagitis some early stenosis and hiatal hernia     Post-op Diagnosis: see impression below    No Known Allergies    Surgeon(s) and Role:     * Unruly Hargrove MD - Primary     Bleeding Abnormalities:  None    Indications:  61-year-old lady history of severe esophagitis hiatal hernia some early stenosis distal esophagus he has centrally age symptomatic unless she eats will fast here for EGD  Pre-Procedure Exam:  See consultation from today  Anesthesia:  Mac     Instruments:  Olympus endoscope    Technique:   ASA class 3 patient was verbally identified less than 100% sensitivity discussed informed consent was obtained which included description of the procedure alternatives risks benefits possible complications including pain bleeding infection perforation arrhythmias and respiratory depression  With the left side down patient was sedated  Scope was advanced to the second third portion of the duodenum  Upon pull out all the walls evaluated  Tolerated the procedure well without any immediate complications  Estimated Blood Loss:  Less than 1 cc    Findings:  Duodenum normal stomach Hill grade 2 DH 39 EG junction squamocolumnar junction 36 with a tongue of erythema multiple biopsies container 1    Impressions:  Hiatal hernia esophagitis  Plan:  Acid suppression reflux precautions office visit 6 months    CC:MD Unruly Mullen MD     Date: 6/18/2018  Time: 8:18 AM

## 2018-06-18 NOTE — DISCHARGE INSTRUCTIONS
We did biopsies of your lower esophagus there is some inflammation recommend reflux precautions continue acid suppression chief food well we will see in the office in 6 months

## 2018-08-27 ENCOUNTER — TRANSCRIBE ORDERS (OUTPATIENT)
Dept: ADMINISTRATIVE | Facility: HOSPITAL | Age: 73
End: 2018-08-27

## 2018-08-27 ENCOUNTER — APPOINTMENT (OUTPATIENT)
Dept: LAB | Facility: HOSPITAL | Age: 73
End: 2018-08-27
Attending: INTERNAL MEDICINE
Payer: MEDICARE

## 2018-08-27 DIAGNOSIS — E03.9 MYXEDEMA HEART DISEASE: ICD-10-CM

## 2018-08-27 DIAGNOSIS — E03.9 MYXEDEMA HEART DISEASE: Primary | ICD-10-CM

## 2018-08-27 DIAGNOSIS — I51.9 MYXEDEMA HEART DISEASE: Primary | ICD-10-CM

## 2018-08-27 DIAGNOSIS — I51.9 MYXEDEMA HEART DISEASE: ICD-10-CM

## 2018-08-27 LAB
T4 FREE SERPL-MCNC: 0.94 NG/DL (ref 0.76–1.46)
TSH SERPL DL<=0.05 MIU/L-ACNC: 3.41 UIU/ML (ref 0.36–3.74)

## 2018-08-27 PROCEDURE — 36415 COLL VENOUS BLD VENIPUNCTURE: CPT

## 2018-08-27 PROCEDURE — 84439 ASSAY OF FREE THYROXINE: CPT

## 2018-08-27 PROCEDURE — 84443 ASSAY THYROID STIM HORMONE: CPT

## 2018-10-09 ENCOUNTER — TRANSCRIBE ORDERS (OUTPATIENT)
Dept: ADMINISTRATIVE | Facility: HOSPITAL | Age: 73
End: 2018-10-09

## 2018-10-09 DIAGNOSIS — Z12.39 SCREENING BREAST EXAMINATION: Primary | ICD-10-CM

## 2018-10-16 ENCOUNTER — HOSPITAL ENCOUNTER (OUTPATIENT)
Dept: RADIOLOGY | Facility: HOSPITAL | Age: 73
Discharge: HOME | End: 2018-10-16
Attending: INTERNAL MEDICINE
Payer: MEDICARE

## 2018-10-16 DIAGNOSIS — Z12.39 SCREENING BREAST EXAMINATION: ICD-10-CM

## 2018-10-16 PROCEDURE — 77067 SCR MAMMO BI INCL CAD: CPT

## 2019-01-02 ENCOUNTER — TRANSCRIBE ORDERS (OUTPATIENT)
Dept: ADMINISTRATIVE | Facility: HOSPITAL | Age: 74
End: 2019-01-02

## 2019-01-02 DIAGNOSIS — E78.00 PURE HYPERCHOLESTEROLEMIA: ICD-10-CM

## 2019-01-02 DIAGNOSIS — D64.9 ANEMIA, UNSPECIFIED TYPE: ICD-10-CM

## 2019-01-02 DIAGNOSIS — Z13.820 SCREENING FOR OSTEOPOROSIS: Primary | ICD-10-CM

## 2019-01-02 DIAGNOSIS — I10 ESSENTIAL HYPERTENSION, MALIGNANT: ICD-10-CM

## 2019-01-02 DIAGNOSIS — E55.9 VITAMIN D DEFICIENCY: ICD-10-CM

## 2019-01-02 DIAGNOSIS — N39.0 URINARY TRACT INFECTION WITHOUT HEMATURIA, SITE UNSPECIFIED: ICD-10-CM

## 2019-01-02 DIAGNOSIS — E09.65 DRUG OR CHEMICAL INDUCED DIABETES MELLITUS WITH HYPERGLYCEMIA, WITH LONG-TERM CURRENT USE OF INSULIN (HCC): ICD-10-CM

## 2019-01-02 DIAGNOSIS — J03.80 VIRAL TONSILLITIS: ICD-10-CM

## 2019-01-02 DIAGNOSIS — Z79.4 DRUG OR CHEMICAL INDUCED DIABETES MELLITUS WITH HYPERGLYCEMIA, WITH LONG-TERM CURRENT USE OF INSULIN (HCC): ICD-10-CM

## 2019-01-02 DIAGNOSIS — B97.89 VIRAL TONSILLITIS: ICD-10-CM

## 2019-01-15 ENCOUNTER — APPOINTMENT (OUTPATIENT)
Dept: LAB | Facility: HOSPITAL | Age: 74
End: 2019-01-15
Attending: INTERNAL MEDICINE
Payer: MEDICARE

## 2019-01-15 ENCOUNTER — HOSPITAL ENCOUNTER (OUTPATIENT)
Dept: RADIOLOGY | Facility: HOSPITAL | Age: 74
Discharge: HOME | End: 2019-01-15
Attending: INTERNAL MEDICINE
Payer: MEDICARE

## 2019-01-15 DIAGNOSIS — N39.0 URINARY TRACT INFECTION WITHOUT HEMATURIA, SITE UNSPECIFIED: ICD-10-CM

## 2019-01-15 DIAGNOSIS — E55.9 VITAMIN D DEFICIENCY: ICD-10-CM

## 2019-01-15 DIAGNOSIS — E09.65 DRUG OR CHEMICAL INDUCED DIABETES MELLITUS WITH HYPERGLYCEMIA, WITH LONG-TERM CURRENT USE OF INSULIN (HCC): ICD-10-CM

## 2019-01-15 DIAGNOSIS — B97.89 VIRAL TONSILLITIS: ICD-10-CM

## 2019-01-15 DIAGNOSIS — D64.9 ANEMIA, UNSPECIFIED TYPE: ICD-10-CM

## 2019-01-15 DIAGNOSIS — Z79.4 DRUG OR CHEMICAL INDUCED DIABETES MELLITUS WITH HYPERGLYCEMIA, WITH LONG-TERM CURRENT USE OF INSULIN (HCC): ICD-10-CM

## 2019-01-15 DIAGNOSIS — I10 ESSENTIAL HYPERTENSION, MALIGNANT: ICD-10-CM

## 2019-01-15 DIAGNOSIS — J03.80 VIRAL TONSILLITIS: ICD-10-CM

## 2019-01-15 DIAGNOSIS — Z13.820 SCREENING FOR OSTEOPOROSIS: ICD-10-CM

## 2019-01-15 DIAGNOSIS — E78.00 PURE HYPERCHOLESTEROLEMIA: ICD-10-CM

## 2019-01-15 LAB
25(OH)D3 SERPL-MCNC: 39.8 NG/ML (ref 30–100)
ALBUMIN SERPL BCP-MCNC: 3.5 G/DL (ref 3.5–5)
ALP SERPL-CCNC: 70 U/L (ref 46–116)
ALT SERPL W P-5'-P-CCNC: 22 U/L (ref 12–78)
ANION GAP SERPL CALCULATED.3IONS-SCNC: 7 MMOL/L (ref 4–13)
AST SERPL W P-5'-P-CCNC: 19 U/L (ref 5–45)
BACTERIA UR QL AUTO: ABNORMAL /HPF
BASOPHILS # BLD AUTO: 0.05 THOUSANDS/ΜL (ref 0–0.1)
BASOPHILS NFR BLD AUTO: 1 % (ref 0–1)
BILIRUB SERPL-MCNC: 0.5 MG/DL (ref 0.2–1)
BILIRUB UR QL STRIP: NEGATIVE
BUN SERPL-MCNC: 17 MG/DL (ref 5–25)
CALCIUM SERPL-MCNC: 8.8 MG/DL (ref 8.3–10.1)
CHLORIDE SERPL-SCNC: 102 MMOL/L (ref 100–108)
CHOLEST SERPL-MCNC: 203 MG/DL (ref 50–200)
CLARITY UR: CLEAR
CO2 SERPL-SCNC: 29 MMOL/L (ref 21–32)
COLOR UR: ABNORMAL
CREAT SERPL-MCNC: 1.04 MG/DL (ref 0.6–1.3)
EOSINOPHIL # BLD AUTO: 0.17 THOUSAND/ΜL (ref 0–0.61)
EOSINOPHIL NFR BLD AUTO: 3 % (ref 0–6)
ERYTHROCYTE [DISTWIDTH] IN BLOOD BY AUTOMATED COUNT: 12.3 % (ref 11.6–15.1)
EST. AVERAGE GLUCOSE BLD GHB EST-MCNC: 117 MG/DL
GFR SERPL CREATININE-BSD FRML MDRD: 54 ML/MIN/1.73SQ M
GLUCOSE P FAST SERPL-MCNC: 93 MG/DL (ref 65–99)
GLUCOSE UR STRIP-MCNC: NEGATIVE MG/DL
HBA1C MFR BLD: 5.7 % (ref 4.2–6.3)
HCT VFR BLD AUTO: 47.3 % (ref 34.8–46.1)
HDLC SERPL-MCNC: 40 MG/DL (ref 40–60)
HGB BLD-MCNC: 15.4 G/DL (ref 11.5–15.4)
HGB UR QL STRIP.AUTO: ABNORMAL
IMM GRANULOCYTES # BLD AUTO: 0.02 THOUSAND/UL (ref 0–0.2)
IMM GRANULOCYTES NFR BLD AUTO: 0 % (ref 0–2)
KETONES UR STRIP-MCNC: NEGATIVE MG/DL
LDLC SERPL CALC-MCNC: 138 MG/DL (ref 0–100)
LEUKOCYTE ESTERASE UR QL STRIP: NEGATIVE
LYMPHOCYTES # BLD AUTO: 2.04 THOUSANDS/ΜL (ref 0.6–4.47)
LYMPHOCYTES NFR BLD AUTO: 38 % (ref 14–44)
MCH RBC QN AUTO: 31.4 PG (ref 26.8–34.3)
MCHC RBC AUTO-ENTMCNC: 32.6 G/DL (ref 31.4–37.4)
MCV RBC AUTO: 96 FL (ref 82–98)
MONOCYTES # BLD AUTO: 0.6 THOUSAND/ΜL (ref 0.17–1.22)
MONOCYTES NFR BLD AUTO: 11 % (ref 4–12)
NEUTROPHILS # BLD AUTO: 2.55 THOUSANDS/ΜL (ref 1.85–7.62)
NEUTS SEG NFR BLD AUTO: 47 % (ref 43–75)
NITRITE UR QL STRIP: NEGATIVE
NON-SQ EPI CELLS URNS QL MICRO: ABNORMAL /HPF
NONHDLC SERPL-MCNC: 163 MG/DL
NRBC BLD AUTO-RTO: 0 /100 WBCS
PH UR STRIP.AUTO: 6 [PH] (ref 5–9)
PLATELET # BLD AUTO: 268 THOUSANDS/UL (ref 149–390)
PMV BLD AUTO: 9.7 FL (ref 8.9–12.7)
POTASSIUM SERPL-SCNC: 3.9 MMOL/L (ref 3.5–5.3)
PROT SERPL-MCNC: 6.6 G/DL (ref 6.4–8.2)
PROT UR STRIP-MCNC: NEGATIVE MG/DL
RBC # BLD AUTO: 4.91 MILLION/UL (ref 3.81–5.12)
RBC #/AREA URNS AUTO: ABNORMAL /HPF
SODIUM SERPL-SCNC: 138 MMOL/L (ref 136–145)
SP GR UR STRIP.AUTO: <=1.005 (ref 1–1.03)
T4 FREE SERPL-MCNC: 1.06 NG/DL (ref 0.76–1.46)
TRIGL SERPL-MCNC: 125 MG/DL
TSH SERPL DL<=0.05 MIU/L-ACNC: 3.08 UIU/ML (ref 0.36–3.74)
UROBILINOGEN UR QL STRIP.AUTO: 0.2 E.U./DL
WBC # BLD AUTO: 5.43 THOUSAND/UL (ref 4.31–10.16)
WBC #/AREA URNS AUTO: ABNORMAL /HPF

## 2019-01-15 PROCEDURE — 83036 HEMOGLOBIN GLYCOSYLATED A1C: CPT | Performed by: INTERNAL MEDICINE

## 2019-01-15 PROCEDURE — 86663 EPSTEIN-BARR ANTIBODY: CPT

## 2019-01-15 PROCEDURE — 80053 COMPREHEN METABOLIC PANEL: CPT | Performed by: INTERNAL MEDICINE

## 2019-01-15 PROCEDURE — 81001 URINALYSIS AUTO W/SCOPE: CPT | Performed by: INTERNAL MEDICINE

## 2019-01-15 PROCEDURE — 84443 ASSAY THYROID STIM HORMONE: CPT

## 2019-01-15 PROCEDURE — 82306 VITAMIN D 25 HYDROXY: CPT

## 2019-01-15 PROCEDURE — 36415 COLL VENOUS BLD VENIPUNCTURE: CPT | Performed by: INTERNAL MEDICINE

## 2019-01-15 PROCEDURE — 84439 ASSAY OF FREE THYROXINE: CPT

## 2019-01-15 PROCEDURE — 87086 URINE CULTURE/COLONY COUNT: CPT

## 2019-01-15 PROCEDURE — 85025 COMPLETE CBC W/AUTO DIFF WBC: CPT | Performed by: INTERNAL MEDICINE

## 2019-01-15 PROCEDURE — 80061 LIPID PANEL: CPT

## 2019-01-15 PROCEDURE — 77080 DXA BONE DENSITY AXIAL: CPT

## 2019-01-16 LAB
BACTERIA UR CULT: NORMAL
EBV EA IGG SER-ACNC: 17.7 U/ML (ref 0–8.9)

## 2019-08-15 ENCOUNTER — APPOINTMENT (OUTPATIENT)
Dept: LAB | Facility: HOSPITAL | Age: 74
End: 2019-08-15
Attending: INTERNAL MEDICINE
Payer: MEDICARE

## 2019-08-15 ENCOUNTER — TRANSCRIBE ORDERS (OUTPATIENT)
Dept: ADMINISTRATIVE | Facility: HOSPITAL | Age: 74
End: 2019-08-15

## 2019-08-15 DIAGNOSIS — E11.51 TYPE II DIABETES MELLITUS WITH PERIPHERAL CIRCULATORY DISORDER (HCC): ICD-10-CM

## 2019-08-15 DIAGNOSIS — J03.80 VIRAL TONSILLITIS: ICD-10-CM

## 2019-08-15 DIAGNOSIS — I51.9 MYXEDEMA HEART DISEASE: ICD-10-CM

## 2019-08-15 DIAGNOSIS — E55.9 VITAMIN D DEFICIENCY: ICD-10-CM

## 2019-08-15 DIAGNOSIS — E03.9 MYXEDEMA HEART DISEASE: ICD-10-CM

## 2019-08-15 DIAGNOSIS — E78.00 PURE HYPERCHOLESTEROLEMIA: ICD-10-CM

## 2019-08-15 DIAGNOSIS — N39.0 URINARY TRACT INFECTION WITHOUT HEMATURIA, SITE UNSPECIFIED: ICD-10-CM

## 2019-08-15 DIAGNOSIS — D51.9 ANEMIA DUE TO VITAMIN B12 DEFICIENCY, UNSPECIFIED B12 DEFICIENCY TYPE: ICD-10-CM

## 2019-08-15 DIAGNOSIS — B97.89 VIRAL TONSILLITIS: ICD-10-CM

## 2019-08-15 DIAGNOSIS — B97.89 VIRAL TONSILLITIS: Primary | ICD-10-CM

## 2019-08-15 DIAGNOSIS — J03.80 VIRAL TONSILLITIS: Primary | ICD-10-CM

## 2019-08-15 LAB
25(OH)D3 SERPL-MCNC: 47 NG/ML (ref 30–100)
ALBUMIN SERPL BCP-MCNC: 3.2 G/DL (ref 3.5–5)
ALP SERPL-CCNC: 59 U/L (ref 46–116)
ALT SERPL W P-5'-P-CCNC: 18 U/L (ref 12–78)
ANION GAP SERPL CALCULATED.3IONS-SCNC: 6 MMOL/L (ref 4–13)
AST SERPL W P-5'-P-CCNC: 16 U/L (ref 5–45)
BACTERIA UR QL AUTO: ABNORMAL /HPF
BASOPHILS # BLD AUTO: 0.05 THOUSANDS/ΜL (ref 0–0.1)
BASOPHILS NFR BLD AUTO: 1 % (ref 0–1)
BILIRUB SERPL-MCNC: 0.4 MG/DL (ref 0.2–1)
BILIRUB UR QL STRIP: NEGATIVE
BUN SERPL-MCNC: 19 MG/DL (ref 5–25)
CALCIUM SERPL-MCNC: 8.1 MG/DL (ref 8.3–10.1)
CHLORIDE SERPL-SCNC: 104 MMOL/L (ref 100–108)
CHOLEST SERPL-MCNC: 177 MG/DL (ref 50–200)
CLARITY UR: CLEAR
CO2 SERPL-SCNC: 28 MMOL/L (ref 21–32)
COLOR UR: YELLOW
CREAT SERPL-MCNC: 0.95 MG/DL (ref 0.6–1.3)
EOSINOPHIL # BLD AUTO: 0.23 THOUSAND/ΜL (ref 0–0.61)
EOSINOPHIL NFR BLD AUTO: 5 % (ref 0–6)
ERYTHROCYTE [DISTWIDTH] IN BLOOD BY AUTOMATED COUNT: 12.5 % (ref 11.6–15.1)
EST. AVERAGE GLUCOSE BLD GHB EST-MCNC: 108 MG/DL
GFR SERPL CREATININE-BSD FRML MDRD: 60 ML/MIN/1.73SQ M
GLUCOSE P FAST SERPL-MCNC: 94 MG/DL (ref 65–99)
GLUCOSE UR STRIP-MCNC: NEGATIVE MG/DL
HBA1C MFR BLD: 5.4 % (ref 4.2–6.3)
HCT VFR BLD AUTO: 43.2 % (ref 34.8–46.1)
HDLC SERPL-MCNC: 39 MG/DL (ref 40–60)
HGB BLD-MCNC: 14.5 G/DL (ref 11.5–15.4)
HGB UR QL STRIP.AUTO: ABNORMAL
IMM GRANULOCYTES # BLD AUTO: 0.01 THOUSAND/UL (ref 0–0.2)
IMM GRANULOCYTES NFR BLD AUTO: 0 % (ref 0–2)
KETONES UR STRIP-MCNC: NEGATIVE MG/DL
LDLC SERPL CALC-MCNC: 120 MG/DL (ref 0–100)
LEUKOCYTE ESTERASE UR QL STRIP: ABNORMAL
LYMPHOCYTES # BLD AUTO: 1.95 THOUSANDS/ΜL (ref 0.6–4.47)
LYMPHOCYTES NFR BLD AUTO: 43 % (ref 14–44)
MCH RBC QN AUTO: 32.1 PG (ref 26.8–34.3)
MCHC RBC AUTO-ENTMCNC: 33.6 G/DL (ref 31.4–37.4)
MCV RBC AUTO: 96 FL (ref 82–98)
MONOCYTES # BLD AUTO: 0.41 THOUSAND/ΜL (ref 0.17–1.22)
MONOCYTES NFR BLD AUTO: 9 % (ref 4–12)
NEUTROPHILS # BLD AUTO: 1.9 THOUSANDS/ΜL (ref 1.85–7.62)
NEUTS SEG NFR BLD AUTO: 42 % (ref 43–75)
NITRITE UR QL STRIP: NEGATIVE
NON-SQ EPI CELLS URNS QL MICRO: ABNORMAL /HPF
NONHDLC SERPL-MCNC: 138 MG/DL
NRBC BLD AUTO-RTO: 0 /100 WBCS
PH UR STRIP.AUTO: 6 [PH]
PLATELET # BLD AUTO: 247 THOUSANDS/UL (ref 149–390)
PMV BLD AUTO: 9.7 FL (ref 8.9–12.7)
POTASSIUM SERPL-SCNC: 3.9 MMOL/L (ref 3.5–5.3)
PROT SERPL-MCNC: 6.3 G/DL (ref 6.4–8.2)
PROT UR STRIP-MCNC: NEGATIVE MG/DL
RBC # BLD AUTO: 4.52 MILLION/UL (ref 3.81–5.12)
RBC #/AREA URNS AUTO: ABNORMAL /HPF
SODIUM SERPL-SCNC: 138 MMOL/L (ref 136–145)
SP GR UR STRIP.AUTO: 1.01 (ref 1–1.03)
T4 FREE SERPL-MCNC: 0.96 NG/DL (ref 0.76–1.46)
TRIGL SERPL-MCNC: 92 MG/DL
TSH SERPL DL<=0.05 MIU/L-ACNC: 3.3 UIU/ML (ref 0.36–3.74)
UROBILINOGEN UR QL STRIP.AUTO: 0.2 E.U./DL
WBC # BLD AUTO: 4.55 THOUSAND/UL (ref 4.31–10.16)
WBC #/AREA URNS AUTO: ABNORMAL /HPF

## 2019-08-15 PROCEDURE — 36415 COLL VENOUS BLD VENIPUNCTURE: CPT | Performed by: INTERNAL MEDICINE

## 2019-08-15 PROCEDURE — 85025 COMPLETE CBC W/AUTO DIFF WBC: CPT | Performed by: INTERNAL MEDICINE

## 2019-08-15 PROCEDURE — 82306 VITAMIN D 25 HYDROXY: CPT

## 2019-08-15 PROCEDURE — 84443 ASSAY THYROID STIM HORMONE: CPT

## 2019-08-15 PROCEDURE — 83036 HEMOGLOBIN GLYCOSYLATED A1C: CPT | Performed by: INTERNAL MEDICINE

## 2019-08-15 PROCEDURE — 81001 URINALYSIS AUTO W/SCOPE: CPT | Performed by: INTERNAL MEDICINE

## 2019-08-15 PROCEDURE — 80061 LIPID PANEL: CPT

## 2019-08-15 PROCEDURE — 84439 ASSAY OF FREE THYROXINE: CPT

## 2019-08-15 PROCEDURE — 80053 COMPREHEN METABOLIC PANEL: CPT | Performed by: INTERNAL MEDICINE

## 2019-09-24 ENCOUNTER — TRANSCRIBE ORDERS (OUTPATIENT)
Dept: ADMINISTRATIVE | Facility: HOSPITAL | Age: 74
End: 2019-09-24

## 2019-09-24 DIAGNOSIS — Z12.39 BREAST SCREENING, UNSPECIFIED: Primary | ICD-10-CM

## 2019-10-28 ENCOUNTER — HOSPITAL ENCOUNTER (OUTPATIENT)
Dept: RADIOLOGY | Facility: HOSPITAL | Age: 74
Discharge: HOME | End: 2019-10-28
Attending: INTERNAL MEDICINE
Payer: MEDICARE

## 2019-10-28 VITALS — BODY MASS INDEX: 33.04 KG/M2 | HEIGHT: 61 IN | WEIGHT: 175 LBS

## 2019-10-28 DIAGNOSIS — Z12.39 BREAST SCREENING: ICD-10-CM

## 2019-10-28 LAB
EXTERNAL CHLAMYDIA RESULT: NOT DETECTED
N GONORRHOEA RRNA SPEC QL PROBE: NOT DETECTED

## 2019-10-28 PROCEDURE — 77067 SCR MAMMO BI INCL CAD: CPT

## 2020-06-18 ENCOUNTER — TRANSCRIBE ORDERS (OUTPATIENT)
Dept: ADMINISTRATIVE | Facility: HOSPITAL | Age: 75
End: 2020-06-18

## 2020-06-18 ENCOUNTER — APPOINTMENT (OUTPATIENT)
Dept: LAB | Facility: HOSPITAL | Age: 75
End: 2020-06-18
Attending: INTERNAL MEDICINE
Payer: MEDICARE

## 2020-06-18 DIAGNOSIS — D64.9 ANEMIA, UNSPECIFIED TYPE: ICD-10-CM

## 2020-06-18 DIAGNOSIS — I10 ESSENTIAL HYPERTENSION, MALIGNANT: ICD-10-CM

## 2020-06-18 DIAGNOSIS — E11.9 DIABETES MELLITUS WITHOUT COMPLICATION (HCC): ICD-10-CM

## 2020-06-18 DIAGNOSIS — E03.9 HYPOTHYROIDISM, UNSPECIFIED TYPE: ICD-10-CM

## 2020-06-18 DIAGNOSIS — E78.00 PURE HYPERCHOLESTEROLEMIA: ICD-10-CM

## 2020-06-18 DIAGNOSIS — N39.0 URINARY TRACT INFECTION WITHOUT HEMATURIA, SITE UNSPECIFIED: ICD-10-CM

## 2020-06-18 DIAGNOSIS — E55.9 VITAMIN D DEFICIENCY: ICD-10-CM

## 2020-06-18 DIAGNOSIS — D64.9 ANEMIA, UNSPECIFIED TYPE: Primary | ICD-10-CM

## 2020-06-18 LAB
25(OH)D3 SERPL-MCNC: 49.9 NG/ML (ref 30–100)
ALBUMIN SERPL BCP-MCNC: 3.5 G/DL (ref 3.5–5)
ALP SERPL-CCNC: 59 U/L (ref 46–116)
ALT SERPL W P-5'-P-CCNC: 27 U/L (ref 12–78)
ANION GAP SERPL CALCULATED.3IONS-SCNC: 9 MMOL/L (ref 4–13)
AST SERPL W P-5'-P-CCNC: 24 U/L (ref 5–45)
BACTERIA UR QL AUTO: ABNORMAL /HPF
BASOPHILS # BLD AUTO: 0.05 THOUSANDS/ΜL (ref 0–0.1)
BASOPHILS NFR BLD AUTO: 1 % (ref 0–1)
BILIRUB SERPL-MCNC: 0.6 MG/DL (ref 0.2–1)
BILIRUB UR QL STRIP: NEGATIVE
BUN SERPL-MCNC: 24 MG/DL (ref 5–25)
CALCIUM SERPL-MCNC: 8 MG/DL (ref 8.3–10.1)
CHLORIDE SERPL-SCNC: 100 MMOL/L (ref 100–108)
CHOLEST SERPL-MCNC: 196 MG/DL (ref 50–200)
CLARITY UR: CLEAR
CO2 SERPL-SCNC: 26 MMOL/L (ref 21–32)
COLOR UR: ABNORMAL
CREAT SERPL-MCNC: 1.04 MG/DL (ref 0.6–1.3)
EOSINOPHIL # BLD AUTO: 0.17 THOUSAND/ΜL (ref 0–0.61)
EOSINOPHIL NFR BLD AUTO: 4 % (ref 0–6)
ERYTHROCYTE [DISTWIDTH] IN BLOOD BY AUTOMATED COUNT: 12.6 % (ref 11.6–15.1)
EST. AVERAGE GLUCOSE BLD GHB EST-MCNC: 114 MG/DL
GFR SERPL CREATININE-BSD FRML MDRD: 53 ML/MIN/1.73SQ M
GLUCOSE P FAST SERPL-MCNC: 90 MG/DL (ref 65–99)
GLUCOSE UR STRIP-MCNC: NEGATIVE MG/DL
HBA1C MFR BLD: 5.6 %
HCT VFR BLD AUTO: 45 % (ref 34.8–46.1)
HDLC SERPL-MCNC: 34 MG/DL
HGB BLD-MCNC: 15 G/DL (ref 11.5–15.4)
HGB UR QL STRIP.AUTO: ABNORMAL
IMM GRANULOCYTES # BLD AUTO: 0.02 THOUSAND/UL (ref 0–0.2)
IMM GRANULOCYTES NFR BLD AUTO: 0 % (ref 0–2)
KETONES UR STRIP-MCNC: NEGATIVE MG/DL
LDLC SERPL CALC-MCNC: 142 MG/DL (ref 0–100)
LEUKOCYTE ESTERASE UR QL STRIP: NEGATIVE
LYMPHOCYTES # BLD AUTO: 1.69 THOUSANDS/ΜL (ref 0.6–4.47)
LYMPHOCYTES NFR BLD AUTO: 37 % (ref 14–44)
MCH RBC QN AUTO: 32 PG (ref 26.8–34.3)
MCHC RBC AUTO-ENTMCNC: 33.3 G/DL (ref 31.4–37.4)
MCV RBC AUTO: 96 FL (ref 82–98)
MONOCYTES # BLD AUTO: 0.5 THOUSAND/ΜL (ref 0.17–1.22)
MONOCYTES NFR BLD AUTO: 11 % (ref 4–12)
NEUTROPHILS # BLD AUTO: 2.12 THOUSANDS/ΜL (ref 1.85–7.62)
NEUTS SEG NFR BLD AUTO: 47 % (ref 43–75)
NITRITE UR QL STRIP: NEGATIVE
NON-SQ EPI CELLS URNS QL MICRO: ABNORMAL /HPF
NONHDLC SERPL-MCNC: 162 MG/DL
NRBC BLD AUTO-RTO: 0 /100 WBCS
PH UR STRIP.AUTO: 6 [PH]
PLATELET # BLD AUTO: 254 THOUSANDS/UL (ref 149–390)
PMV BLD AUTO: 9.6 FL (ref 8.9–12.7)
POTASSIUM SERPL-SCNC: 3.9 MMOL/L (ref 3.5–5.3)
PROT SERPL-MCNC: 6.6 G/DL (ref 6.4–8.2)
PROT UR STRIP-MCNC: NEGATIVE MG/DL
RBC # BLD AUTO: 4.69 MILLION/UL (ref 3.81–5.12)
RBC #/AREA URNS AUTO: ABNORMAL /HPF
SODIUM SERPL-SCNC: 135 MMOL/L (ref 136–145)
SP GR UR STRIP.AUTO: <=1.005 (ref 1–1.03)
T4 FREE SERPL-MCNC: 1.17 NG/DL (ref 0.76–1.46)
TRIGL SERPL-MCNC: 98 MG/DL
TSH SERPL DL<=0.05 MIU/L-ACNC: 1.86 UIU/ML (ref 0.36–3.74)
UROBILINOGEN UR QL STRIP.AUTO: 0.2 E.U./DL
WBC # BLD AUTO: 4.55 THOUSAND/UL (ref 4.31–10.16)
WBC #/AREA URNS AUTO: ABNORMAL /HPF

## 2020-06-18 PROCEDURE — 84439 ASSAY OF FREE THYROXINE: CPT

## 2020-06-18 PROCEDURE — 85025 COMPLETE CBC W/AUTO DIFF WBC: CPT | Performed by: INTERNAL MEDICINE

## 2020-06-18 PROCEDURE — 81001 URINALYSIS AUTO W/SCOPE: CPT | Performed by: INTERNAL MEDICINE

## 2020-06-18 PROCEDURE — 80053 COMPREHEN METABOLIC PANEL: CPT | Performed by: INTERNAL MEDICINE

## 2020-06-18 PROCEDURE — 80061 LIPID PANEL: CPT | Performed by: INTERNAL MEDICINE

## 2020-06-18 PROCEDURE — 36415 COLL VENOUS BLD VENIPUNCTURE: CPT | Performed by: INTERNAL MEDICINE

## 2020-06-18 PROCEDURE — 82306 VITAMIN D 25 HYDROXY: CPT

## 2020-06-18 PROCEDURE — 84443 ASSAY THYROID STIM HORMONE: CPT

## 2020-06-18 PROCEDURE — 83036 HEMOGLOBIN GLYCOSYLATED A1C: CPT | Performed by: INTERNAL MEDICINE

## 2020-10-20 ENCOUNTER — TRANSCRIBE ORDERS (OUTPATIENT)
Dept: ADMINISTRATIVE | Facility: HOSPITAL | Age: 75
End: 2020-10-20

## 2020-10-20 DIAGNOSIS — M81.6 LOCALIZED OSTEOPOROSIS (LEQUESNE): ICD-10-CM

## 2020-10-20 DIAGNOSIS — Z12.31 ENCOUNTER FOR SCREENING MAMMOGRAM FOR MALIGNANT NEOPLASM OF BREAST: Primary | ICD-10-CM

## 2020-10-20 DIAGNOSIS — Z13.820 ENCOUNTER FOR SCREENING FOR OSTEOPOROSIS: ICD-10-CM

## 2020-11-16 ENCOUNTER — TRANSCRIBE ORDERS (OUTPATIENT)
Dept: ADMINISTRATIVE | Facility: HOSPITAL | Age: 75
End: 2020-11-16

## 2020-11-16 ENCOUNTER — LAB (OUTPATIENT)
Dept: LAB | Facility: HOSPITAL | Age: 75
End: 2020-11-16
Attending: INTERNAL MEDICINE
Payer: MEDICARE

## 2020-11-16 DIAGNOSIS — E03.9 HYPOTHYROIDISM, UNSPECIFIED TYPE: ICD-10-CM

## 2020-11-16 DIAGNOSIS — E11.9 DIABETES MELLITUS WITHOUT COMPLICATION (HCC): ICD-10-CM

## 2020-11-16 DIAGNOSIS — E78.00 PURE HYPERCHOLESTEROLEMIA: ICD-10-CM

## 2020-11-16 DIAGNOSIS — Z20.89 CONTACT WITH OR EXPOSURE TO POLIOMYELITIS: Primary | ICD-10-CM

## 2020-11-16 DIAGNOSIS — N39.0 URINARY TRACT INFECTION WITHOUT HEMATURIA, SITE UNSPECIFIED: ICD-10-CM

## 2020-11-16 DIAGNOSIS — E55.9 VITAMIN D DEFICIENCY: ICD-10-CM

## 2020-11-16 DIAGNOSIS — D64.9 ANEMIA, UNSPECIFIED TYPE: ICD-10-CM

## 2020-11-16 DIAGNOSIS — I10 ESSENTIAL HYPERTENSION, MALIGNANT: ICD-10-CM

## 2020-11-16 DIAGNOSIS — Z20.89 CONTACT WITH OR EXPOSURE TO POLIOMYELITIS: ICD-10-CM

## 2020-11-16 LAB
ALBUMIN SERPL BCP-MCNC: 3.5 G/DL (ref 3.5–5)
ALP SERPL-CCNC: 68 U/L (ref 46–116)
ALT SERPL W P-5'-P-CCNC: 24 U/L (ref 12–78)
ANION GAP SERPL CALCULATED.3IONS-SCNC: 6 MMOL/L (ref 4–13)
AST SERPL W P-5'-P-CCNC: 22 U/L (ref 5–45)
BACTERIA UR QL AUTO: ABNORMAL /HPF
BASOPHILS # BLD AUTO: 0.07 THOUSANDS/ΜL (ref 0–0.1)
BASOPHILS NFR BLD AUTO: 2 % (ref 0–1)
BILIRUB SERPL-MCNC: 0.4 MG/DL (ref 0.2–1)
BILIRUB UR QL STRIP: NEGATIVE
BUN SERPL-MCNC: 26 MG/DL (ref 5–25)
CALCIUM SERPL-MCNC: 9.2 MG/DL (ref 8.3–10.1)
CHLORIDE SERPL-SCNC: 102 MMOL/L (ref 100–108)
CHOLEST SERPL-MCNC: 201 MG/DL (ref 50–200)
CLARITY UR: CLEAR
CO2 SERPL-SCNC: 30 MMOL/L (ref 21–32)
COLOR UR: ABNORMAL
CREAT SERPL-MCNC: 1.02 MG/DL (ref 0.6–1.3)
EOSINOPHIL # BLD AUTO: 0.24 THOUSAND/ΜL (ref 0–0.61)
EOSINOPHIL NFR BLD AUTO: 5 % (ref 0–6)
ERYTHROCYTE [DISTWIDTH] IN BLOOD BY AUTOMATED COUNT: 12.5 % (ref 11.6–15.1)
GFR SERPL CREATININE-BSD FRML MDRD: 55 ML/MIN/1.73SQ M
GLUCOSE P FAST SERPL-MCNC: 94 MG/DL (ref 65–99)
GLUCOSE UR STRIP-MCNC: NEGATIVE MG/DL
HCT VFR BLD AUTO: 47.4 % (ref 34.8–46.1)
HDLC SERPL-MCNC: 44 MG/DL
HGB BLD-MCNC: 15.2 G/DL (ref 11.5–15.4)
HGB UR QL STRIP.AUTO: ABNORMAL
IMM GRANULOCYTES # BLD AUTO: 0.01 THOUSAND/UL (ref 0–0.2)
IMM GRANULOCYTES NFR BLD AUTO: 0 % (ref 0–2)
KETONES UR STRIP-MCNC: NEGATIVE MG/DL
LDLC SERPL CALC-MCNC: 134 MG/DL (ref 0–100)
LEUKOCYTE ESTERASE UR QL STRIP: NEGATIVE
LYMPHOCYTES # BLD AUTO: 1.84 THOUSANDS/ΜL (ref 0.6–4.47)
LYMPHOCYTES NFR BLD AUTO: 38 % (ref 14–44)
MCH RBC QN AUTO: 31.4 PG (ref 26.8–34.3)
MCHC RBC AUTO-ENTMCNC: 32.1 G/DL (ref 31.4–37.4)
MCV RBC AUTO: 98 FL (ref 82–98)
MONOCYTES # BLD AUTO: 0.5 THOUSAND/ΜL (ref 0.17–1.22)
MONOCYTES NFR BLD AUTO: 10 % (ref 4–12)
NEUTROPHILS # BLD AUTO: 2.14 THOUSANDS/ΜL (ref 1.85–7.62)
NEUTS SEG NFR BLD AUTO: 45 % (ref 43–75)
NITRITE UR QL STRIP: NEGATIVE
NON-SQ EPI CELLS URNS QL MICRO: ABNORMAL /HPF
NONHDLC SERPL-MCNC: 157 MG/DL
NRBC BLD AUTO-RTO: 0 /100 WBCS
PH UR STRIP.AUTO: 5 [PH]
PLATELET # BLD AUTO: 263 THOUSANDS/UL (ref 149–390)
PMV BLD AUTO: 9.7 FL (ref 8.9–12.7)
POTASSIUM SERPL-SCNC: 4.4 MMOL/L (ref 3.5–5.3)
PROT SERPL-MCNC: 6.7 G/DL (ref 6.4–8.2)
PROT UR STRIP-MCNC: NEGATIVE MG/DL
RBC # BLD AUTO: 4.84 MILLION/UL (ref 3.81–5.12)
RBC #/AREA URNS AUTO: ABNORMAL /HPF
SODIUM SERPL-SCNC: 138 MMOL/L (ref 136–145)
SP GR UR STRIP.AUTO: 1.02 (ref 1–1.03)
T4 FREE SERPL-MCNC: 1.06 NG/DL (ref 0.76–1.46)
TRIGL SERPL-MCNC: 116 MG/DL
TSH SERPL DL<=0.05 MIU/L-ACNC: 3.59 UIU/ML (ref 0.36–3.74)
UROBILINOGEN UR QL STRIP.AUTO: 0.2 E.U./DL
WBC # BLD AUTO: 4.8 THOUSAND/UL (ref 4.31–10.16)
WBC #/AREA URNS AUTO: ABNORMAL /HPF

## 2020-11-16 PROCEDURE — 36415 COLL VENOUS BLD VENIPUNCTURE: CPT | Performed by: INTERNAL MEDICINE

## 2020-11-16 PROCEDURE — 84443 ASSAY THYROID STIM HORMONE: CPT

## 2020-11-16 PROCEDURE — 81001 URINALYSIS AUTO W/SCOPE: CPT | Performed by: INTERNAL MEDICINE

## 2020-11-16 PROCEDURE — 80053 COMPREHEN METABOLIC PANEL: CPT | Performed by: INTERNAL MEDICINE

## 2020-11-16 PROCEDURE — 80061 LIPID PANEL: CPT | Performed by: INTERNAL MEDICINE

## 2020-11-16 PROCEDURE — 85025 COMPLETE CBC W/AUTO DIFF WBC: CPT | Performed by: INTERNAL MEDICINE

## 2020-11-16 PROCEDURE — 84439 ASSAY OF FREE THYROXINE: CPT

## 2021-02-17 ENCOUNTER — HOSPITAL ENCOUNTER (OUTPATIENT)
Dept: RADIOLOGY | Facility: HOSPITAL | Age: 76
Discharge: HOME | End: 2021-02-17
Attending: INTERNAL MEDICINE
Payer: MEDICARE

## 2021-02-17 VITALS — HEIGHT: 62 IN | BODY MASS INDEX: 30.36 KG/M2 | WEIGHT: 165 LBS

## 2021-02-17 DIAGNOSIS — Z13.820 ENCOUNTER FOR SCREENING FOR OSTEOPOROSIS: ICD-10-CM

## 2021-02-17 DIAGNOSIS — Z12.31 ENCOUNTER FOR SCREENING MAMMOGRAM FOR MALIGNANT NEOPLASM OF BREAST: ICD-10-CM

## 2021-02-17 DIAGNOSIS — M81.6 LOCALIZED OSTEOPOROSIS (LEQUESNE): ICD-10-CM

## 2021-02-17 PROCEDURE — 77063 BREAST TOMOSYNTHESIS BI: CPT

## 2021-02-17 PROCEDURE — 77080 DXA BONE DENSITY AXIAL: CPT

## 2021-02-17 PROCEDURE — 77067 SCR MAMMO BI INCL CAD: CPT

## 2021-05-07 ENCOUNTER — TRANSCRIBE ORDERS (OUTPATIENT)
Dept: ADMINISTRATIVE | Facility: HOSPITAL | Age: 76
End: 2021-05-07

## 2021-05-07 ENCOUNTER — APPOINTMENT (OUTPATIENT)
Dept: LAB | Facility: HOSPITAL | Age: 76
End: 2021-05-07
Attending: INTERNAL MEDICINE
Payer: MEDICARE

## 2021-05-07 DIAGNOSIS — N39.0 URINARY TRACT INFECTION WITHOUT HEMATURIA, SITE UNSPECIFIED: ICD-10-CM

## 2021-05-07 DIAGNOSIS — E55.9 VITAMIN D DEFICIENCY: ICD-10-CM

## 2021-05-07 DIAGNOSIS — D64.9 ANEMIA, UNSPECIFIED TYPE: ICD-10-CM

## 2021-05-07 DIAGNOSIS — I10 ESSENTIAL HYPERTENSION, MALIGNANT: ICD-10-CM

## 2021-05-07 DIAGNOSIS — E78.00 PURE HYPERCHOLESTEROLEMIA: ICD-10-CM

## 2021-05-07 DIAGNOSIS — E11.9 DIABETES MELLITUS WITHOUT COMPLICATION (HCC): ICD-10-CM

## 2021-05-07 DIAGNOSIS — E03.9 HYPOTHYROIDISM, UNSPECIFIED TYPE: ICD-10-CM

## 2021-05-07 DIAGNOSIS — E11.9 DIABETES MELLITUS WITHOUT COMPLICATION (HCC): Primary | ICD-10-CM

## 2021-05-07 LAB
ALBUMIN SERPL BCP-MCNC: 3.4 G/DL (ref 3.5–5)
ALP SERPL-CCNC: 65 U/L (ref 46–116)
ALT SERPL W P-5'-P-CCNC: 19 U/L (ref 12–78)
ANION GAP SERPL CALCULATED.3IONS-SCNC: 7 MMOL/L (ref 4–13)
AST SERPL W P-5'-P-CCNC: 16 U/L (ref 5–45)
BACTERIA UR QL AUTO: ABNORMAL /HPF
BASOPHILS # BLD AUTO: 0.05 THOUSANDS/ΜL (ref 0–0.1)
BASOPHILS NFR BLD AUTO: 1 % (ref 0–1)
BILIRUB SERPL-MCNC: 0.38 MG/DL (ref 0.2–1)
BILIRUB UR QL STRIP: NEGATIVE
BUN SERPL-MCNC: 18 MG/DL (ref 5–25)
CALCIUM ALBUM COR SERPL-MCNC: 8.8 MG/DL (ref 8.3–10.1)
CALCIUM SERPL-MCNC: 8.3 MG/DL (ref 8.3–10.1)
CHLORIDE SERPL-SCNC: 105 MMOL/L (ref 100–108)
CHOLEST SERPL-MCNC: 194 MG/DL (ref 50–200)
CLARITY UR: CLEAR
CO2 SERPL-SCNC: 29 MMOL/L (ref 21–32)
COLOR UR: YELLOW
CREAT SERPL-MCNC: 0.96 MG/DL (ref 0.6–1.3)
EOSINOPHIL # BLD AUTO: 0.21 THOUSAND/ΜL (ref 0–0.61)
EOSINOPHIL NFR BLD AUTO: 4 % (ref 0–6)
ERYTHROCYTE [DISTWIDTH] IN BLOOD BY AUTOMATED COUNT: 12.8 % (ref 11.6–15.1)
EST. AVERAGE GLUCOSE BLD GHB EST-MCNC: 111 MG/DL
GFR SERPL CREATININE-BSD FRML MDRD: 58 ML/MIN/1.73SQ M
GLUCOSE P FAST SERPL-MCNC: 97 MG/DL (ref 65–99)
GLUCOSE UR STRIP-MCNC: NEGATIVE MG/DL
HBA1C MFR BLD: 5.5 %
HCT VFR BLD AUTO: 45.1 % (ref 34.8–46.1)
HDLC SERPL-MCNC: 43 MG/DL
HGB BLD-MCNC: 14.6 G/DL (ref 11.5–15.4)
HGB UR QL STRIP.AUTO: ABNORMAL
IMM GRANULOCYTES # BLD AUTO: 0.01 THOUSAND/UL (ref 0–0.2)
IMM GRANULOCYTES NFR BLD AUTO: 0 % (ref 0–2)
KETONES UR STRIP-MCNC: NEGATIVE MG/DL
LDLC SERPL CALC-MCNC: 126 MG/DL (ref 0–100)
LEUKOCYTE ESTERASE UR QL STRIP: NEGATIVE
LYMPHOCYTES # BLD AUTO: 1.85 THOUSANDS/ΜL (ref 0.6–4.47)
LYMPHOCYTES NFR BLD AUTO: 39 % (ref 14–44)
MCH RBC QN AUTO: 31.9 PG (ref 26.8–34.3)
MCHC RBC AUTO-ENTMCNC: 32.4 G/DL (ref 31.4–37.4)
MCV RBC AUTO: 99 FL (ref 82–98)
MONOCYTES # BLD AUTO: 0.53 THOUSAND/ΜL (ref 0.17–1.22)
MONOCYTES NFR BLD AUTO: 11 % (ref 4–12)
NEUTROPHILS # BLD AUTO: 2.09 THOUSANDS/ΜL (ref 1.85–7.62)
NEUTS SEG NFR BLD AUTO: 45 % (ref 43–75)
NITRITE UR QL STRIP: NEGATIVE
NON-SQ EPI CELLS URNS QL MICRO: ABNORMAL /HPF
NONHDLC SERPL-MCNC: 151 MG/DL
NRBC BLD AUTO-RTO: 0 /100 WBCS
PH UR STRIP.AUTO: 5.5 [PH]
PLATELET # BLD AUTO: 275 THOUSANDS/UL (ref 149–390)
PMV BLD AUTO: 9.7 FL (ref 8.9–12.7)
POTASSIUM SERPL-SCNC: 4.1 MMOL/L (ref 3.5–5.3)
PROT SERPL-MCNC: 6.5 G/DL (ref 6.4–8.2)
PROT UR STRIP-MCNC: NEGATIVE MG/DL
RBC # BLD AUTO: 4.58 MILLION/UL (ref 3.81–5.12)
RBC #/AREA URNS AUTO: ABNORMAL /HPF
SODIUM SERPL-SCNC: 141 MMOL/L (ref 136–145)
SP GR UR STRIP.AUTO: 1.02 (ref 1–1.03)
T4 FREE SERPL-MCNC: 1.04 NG/DL (ref 0.76–1.46)
TRIGL SERPL-MCNC: 125 MG/DL
TSH SERPL DL<=0.05 MIU/L-ACNC: 3.17 UIU/ML (ref 0.36–3.74)
UROBILINOGEN UR QL STRIP.AUTO: 0.2 E.U./DL
WBC # BLD AUTO: 4.74 THOUSAND/UL (ref 4.31–10.16)
WBC #/AREA URNS AUTO: ABNORMAL /HPF

## 2021-05-07 PROCEDURE — 80061 LIPID PANEL: CPT | Performed by: INTERNAL MEDICINE

## 2021-05-07 PROCEDURE — 36415 COLL VENOUS BLD VENIPUNCTURE: CPT | Performed by: INTERNAL MEDICINE

## 2021-05-07 PROCEDURE — 84439 ASSAY OF FREE THYROXINE: CPT

## 2021-05-07 PROCEDURE — 83036 HEMOGLOBIN GLYCOSYLATED A1C: CPT | Performed by: INTERNAL MEDICINE

## 2021-05-07 PROCEDURE — 87086 URINE CULTURE/COLONY COUNT: CPT

## 2021-05-07 PROCEDURE — 85025 COMPLETE CBC W/AUTO DIFF WBC: CPT | Performed by: INTERNAL MEDICINE

## 2021-05-07 PROCEDURE — 84443 ASSAY THYROID STIM HORMONE: CPT

## 2021-05-07 PROCEDURE — 80053 COMPREHEN METABOLIC PANEL: CPT | Performed by: INTERNAL MEDICINE

## 2021-05-07 PROCEDURE — 81001 URINALYSIS AUTO W/SCOPE: CPT | Performed by: INTERNAL MEDICINE

## 2021-05-08 LAB — BACTERIA UR CULT: NORMAL

## 2021-06-23 ENCOUNTER — NURSE TRIAGE (OUTPATIENT)
Dept: OTHER | Facility: OTHER | Age: 76
End: 2021-06-23

## 2021-06-23 ENCOUNTER — OFFICE VISIT (OUTPATIENT)
Dept: OBGYN CLINIC | Facility: CLINIC | Age: 76
End: 2021-06-23
Payer: MEDICARE

## 2021-06-23 VITALS
HEIGHT: 62 IN | WEIGHT: 177 LBS | SYSTOLIC BLOOD PRESSURE: 150 MMHG | BODY MASS INDEX: 32.57 KG/M2 | DIASTOLIC BLOOD PRESSURE: 68 MMHG

## 2021-06-23 DIAGNOSIS — N76.4 ABSCESS OF LABIA: Primary | ICD-10-CM

## 2021-06-23 PROCEDURE — 87077 CULTURE AEROBIC IDENTIFY: CPT | Performed by: OBSTETRICS & GYNECOLOGY

## 2021-06-23 PROCEDURE — 87186 SC STD MICRODIL/AGAR DIL: CPT | Performed by: OBSTETRICS & GYNECOLOGY

## 2021-06-23 PROCEDURE — 87070 CULTURE OTHR SPECIMN AEROBIC: CPT | Performed by: OBSTETRICS & GYNECOLOGY

## 2021-06-23 PROCEDURE — 87205 SMEAR GRAM STAIN: CPT | Performed by: OBSTETRICS & GYNECOLOGY

## 2021-06-23 PROCEDURE — 87075 CULTR BACTERIA EXCEPT BLOOD: CPT | Performed by: OBSTETRICS & GYNECOLOGY

## 2021-06-23 PROCEDURE — 99203 OFFICE O/P NEW LOW 30 MIN: CPT | Performed by: OBSTETRICS & GYNECOLOGY

## 2021-06-23 RX ORDER — AMOXICILLIN AND CLAVULANATE POTASSIUM 875; 125 MG/1; MG/1
1 TABLET, FILM COATED ORAL EVERY 12 HOURS SCHEDULED
Qty: 14 TABLET | Refills: 0 | Status: SHIPPED | OUTPATIENT
Start: 2021-06-23 | End: 2021-06-30

## 2021-06-23 NOTE — PROGRESS NOTES
tAssessment/Plan:     There are no diagnoses linked to this encounter  73-year-old female   Vulvar abscess left side   Plan   I&D performed today   Culture sent to the lab  Augmentin   Heating pad recommended  Will call patient with results  Subjective:      Patient ID: Rome Iyer is a 78 y o  female  73-year-old female presents to the office today secondary to swollen noted on her left labia  Vaginal Pain  The patient's primary symptoms include genital lesions  This is a new problem  The current episode started in the past 7 days  The problem occurs constantly  The problem has been gradually worsening  The pain is mild  Pertinent negatives include no constipation, diarrhea, dysuria, fever, frequency, hematuria, nausea, painful intercourse or urgency  Nothing aggravates the symptoms  She has tried nothing for the symptoms  She is sexually active  She is postmenopausal        The following portions of the patient's history were reviewed and updated as appropriate: allergies, current medications, past family history, past medical history, past social history, past surgical history and problem list     Review of Systems   Constitutional: Negative for fever  Gastrointestinal: Negative for constipation, diarrhea and nausea  Genitourinary: Positive for vaginal pain  Negative for dysuria, frequency, hematuria and urgency  Objective:      /68 (BP Location: Left arm, Patient Position: Sitting, Cuff Size: Standard)   Ht 5' 2" (1 575 m)   Wt 80 3 kg (177 lb)   BMI 32 37 kg/m²          Physical Exam  Constitutional:       Appearance: She is well-developed  Abdominal:      General: There is no distension  Palpations: Abdomen is soft  Tenderness: There is no abdominal tenderness  Genitourinary:     Labia:         Right: No rash, tenderness or lesion  Left: No rash, tenderness or lesion  Vagina: No signs of injury  No vaginal discharge, erythema or tenderness  Cervix: No cervical motion tenderness, discharge or friability  Adnexa:         Right: No mass, tenderness or fullness  Left: No mass, tenderness or fullness  Comments:  Erythematous edematous skin noted with a area of indentation in the middle discolored 11  Blade was used after area clean with lidocaine for incision moderate amount of Pus fluid obtained a robotic and non atopic culture performed   heating pad recommended as well as antibiotics would be called to patient's pharmacy  Neurological:      Mental Status: She is alert and oriented to person, place, and time     Psychiatric:         Behavior: Behavior normal

## 2021-06-23 NOTE — TELEPHONE ENCOUNTER
Patient advised to follow up with office during office hours this morning to schedule an appointment  Reason for Disposition   Tender lump (swelling or "ball") at vaginal opening    Answer Assessment - Initial Assessment Questions  1  SYMPTOM: "What's the main symptom you're concerned about?" (e g , rash, itching, swelling, dryness)      Pain   2  LOCATION: "Where is the  *No Answer* located?" (e g , inside/outside, left/right)      Left outside of labia   3  ONSET: "When did the  *No Answer*  start?"      "I just noticed it over the weekend  Everyday it kept getting bigger and bigger  From yesterday to today it is bigger  It's about the size of a thumb "   4  PAIN: "Is there any pain?" If so, ask: "How bad is it?" (Scale: 1-10; mild, moderate, severe)    -  MILD (1-3): doesn't interfere with normal activities     -  MODERATE (4-7): interferes with normal activities (e g , work or school) or awakens from sleep      -  SEVERE (8-10): excruciating pain, unable to do any normal activities      7-8/10 moderate-severe "It's so uncomfortable  I can't even put under ware on  I can't sit down  It feels like there is pressure, but it's not draining "   5  CAUSE: "What do you think is causing the symptoms?"      Unknown   6  OTHER SYMPTOMS: "Do you have any other symptoms?" (e g , fever, vaginal bleeding, pain with urination)      Denies  7  MEDICATIONS:      Denies any pain medication   "I took one old antibiotic yesterday to see if it would help and it didn't"    Protocols used: [facility]

## 2021-06-25 LAB
BACTERIA WND AEROBE CULT: ABNORMAL
BACTERIA WND AEROBE CULT: ABNORMAL
GRAM STN SPEC: ABNORMAL

## 2021-06-26 LAB — BACTERIA SPEC ANAEROBE CULT: ABNORMAL

## 2021-06-30 ENCOUNTER — TELEPHONE (OUTPATIENT)
Dept: OBGYN CLINIC | Facility: CLINIC | Age: 76
End: 2021-06-30

## 2021-06-30 NOTE — TELEPHONE ENCOUNTER
Patient was seen 6/23 for an abscess of the labia, she is on her last pill and states that it is 90% gone, patient is worried about it filling back up with puss when shes done with the medication and would like to know if she should be seen or if there is nothing to worry about for the moment   Please advise

## 2021-06-30 NOTE — TELEPHONE ENCOUNTER
Patient Chicho Kim MD  Obn [facility] And Cheryl Zarco 2 hours ago (10:54 AM)        This we can see the patient for follow-up or she can be seen by Dr Lilly Magallanes  As she is her primary OB gyn

## 2021-06-30 NOTE — TELEPHONE ENCOUNTER
Spoke with patient, pt stated she will continue to watch the abscess, and will complete last pill of antibiotics  She stated is has gone down in size  If she notices that it isnt healing, she will call the office and schedule a follow up  She would like to follow up with holly at that time

## 2021-06-30 NOTE — TELEPHONE ENCOUNTER
This we can see the patient for follow-up or she can be seen by Dr Christopher Rocha  As she is her primary OB gyn

## 2022-02-28 ENCOUNTER — TELEPHONE (OUTPATIENT)
Dept: GASTROENTEROLOGY | Facility: CLINIC | Age: 77
End: 2022-02-28

## 2022-02-28 ENCOUNTER — PREP FOR PROCEDURE (OUTPATIENT)
Dept: GASTROENTEROLOGY | Facility: CLINIC | Age: 77
End: 2022-02-28

## 2022-02-28 DIAGNOSIS — Z86.010 HISTORY OF COLON POLYPS: ICD-10-CM

## 2022-02-28 DIAGNOSIS — K21.9 GASTROESOPHAGEAL REFLUX DISEASE, UNSPECIFIED WHETHER ESOPHAGITIS PRESENT: Primary | ICD-10-CM

## 2022-02-28 DIAGNOSIS — Z01.818 ENCOUNTER FOR PREADMISSION TESTING: ICD-10-CM

## 2022-02-28 NOTE — TELEPHONE ENCOUNTER
Arpan Mckeon 27 Assessment    Name: Alvarado Cunningham  YOB: 1945  Last Height: 5' 2" (1 575 m)  Last weight: 80 3 kg (177 lb)  BMI: 32 37 kg/m²  Procedure: Colonoscopy/EGD  Diagnosis: Hx of polyps/GERD  Date of procedure: 4/13/22  Prep: Jose/Dul    Responsible :? Phone#: na  Name completing form: Carlos Fuentes  Date form completed: 02/28/22    If the patient answers yes to any of these questions, schedule in a hospital  Are you pregnant: No  Do you rely on a wheelchair for mobility: No  Have you been diagnosed with End Stage Renal Disease (ESRD): No  Do you need oxygen during the day: No  Have you had a heart attack or stroke within the past three months: No  Have you had a seizure within the past three months: No  Have you ever been informed by anesthesia that you have a difficult airway: No  Additional Questions  Have you had any cardiac testing or are under the care of a Cardiologist (see cardiac list): No  Cardiac list:   Do you have an implanted cardiac defibrillator: No (Comment:  This patient should be scheduled in the hospital)    Have any bleeding problems, such as anemia or hemophilia (If patient has H&H result below 8, schedule in hospital   H&H must be within 30 days of procedure): No    Had an organ transplant within the past 3 months: No    Do you have any present infections: No  Do you get short of breath when walking a few blocks: No  Have you been diagnosed with diabetes: No  Comments (provide cardiac provider information if applicable):

## 2022-02-28 NOTE — TELEPHONE ENCOUNTER
Scheduled date of EGD/colonoscopy (as of today): 4/13/22    Physician performing EGD/colonoscopy:Devon    Location of EGD/colonoscopy:[facility]    Desired bowel prep reviewed with patient:Jose/Renetta    Instructions reviewed with patient by:PETER    Clearances:  NA

## 2022-03-16 ENCOUNTER — APPOINTMENT (OUTPATIENT)
Dept: LAB | Facility: HOSPITAL | Age: 77
End: 2022-03-16
Attending: INTERNAL MEDICINE
Payer: MEDICARE

## 2022-03-16 ENCOUNTER — HOSPITAL ENCOUNTER (OUTPATIENT)
Dept: RADIOLOGY | Facility: HOSPITAL | Age: 77
Discharge: HOME | End: 2022-03-16
Attending: INTERNAL MEDICINE
Payer: MEDICARE

## 2022-03-16 VITALS — HEIGHT: 62 IN | BODY MASS INDEX: 32.57 KG/M2 | WEIGHT: 177 LBS

## 2022-03-16 DIAGNOSIS — E03.5 HYPOTHYROID COMA (HCC): ICD-10-CM

## 2022-03-16 DIAGNOSIS — I10 ESSENTIAL HYPERTENSION, MALIGNANT: ICD-10-CM

## 2022-03-16 DIAGNOSIS — Z12.31 OTHER SCREENING MAMMOGRAM: ICD-10-CM

## 2022-03-16 DIAGNOSIS — E08.00 DIABETES MELLITUS DUE TO UNDERLYING CONDITION WITH HYPEROSMOLARITY WITHOUT COMA, WITHOUT LONG-TERM CURRENT USE OF INSULIN (HCC): ICD-10-CM

## 2022-03-16 DIAGNOSIS — E78.00 PURE HYPERCHOLESTEROLEMIA: ICD-10-CM

## 2022-03-16 DIAGNOSIS — N10 ACUTE PYELONEPHRITIS: ICD-10-CM

## 2022-03-16 DIAGNOSIS — E55.9 AVITAMINOSIS D: ICD-10-CM

## 2022-03-16 DIAGNOSIS — D50.0 IRON DEFICIENCY ANEMIA DUE TO CHRONIC BLOOD LOSS: ICD-10-CM

## 2022-03-16 LAB
25(OH)D3 SERPL-MCNC: 37 NG/ML (ref 30–100)
ALBUMIN SERPL BCP-MCNC: 3.8 G/DL (ref 3.5–5)
ALP SERPL-CCNC: 60 U/L (ref 46–116)
ALT SERPL W P-5'-P-CCNC: 22 U/L (ref 12–78)
ANION GAP SERPL CALCULATED.3IONS-SCNC: 9 MMOL/L (ref 4–13)
AST SERPL W P-5'-P-CCNC: 15 U/L (ref 5–45)
BASOPHILS # BLD AUTO: 0.04 THOUSANDS/ΜL (ref 0–0.1)
BASOPHILS NFR BLD AUTO: 1 % (ref 0–1)
BILIRUB SERPL-MCNC: 0.5 MG/DL (ref 0.2–1)
BUN SERPL-MCNC: 22 MG/DL (ref 5–25)
CALCIUM SERPL-MCNC: 8.2 MG/DL (ref 8.3–10.1)
CHLORIDE SERPL-SCNC: 108 MMOL/L (ref 100–108)
CHOLEST SERPL-MCNC: 199 MG/DL
CO2 SERPL-SCNC: 26 MMOL/L (ref 21–32)
CREAT SERPL-MCNC: 1.05 MG/DL (ref 0.6–1.3)
EOSINOPHIL # BLD AUTO: 0.18 THOUSAND/ΜL (ref 0–0.61)
EOSINOPHIL NFR BLD AUTO: 4 % (ref 0–6)
ERYTHROCYTE [DISTWIDTH] IN BLOOD BY AUTOMATED COUNT: 12.9 % (ref 11.6–15.1)
EST. AVERAGE GLUCOSE BLD GHB EST-MCNC: 111 MG/DL
GFR SERPL CREATININE-BSD FRML MDRD: 52 ML/MIN/1.73SQ M
GLUCOSE SERPL-MCNC: 99 MG/DL (ref 65–140)
HBA1C MFR BLD: 5.5 %
HCT VFR BLD AUTO: 45.3 % (ref 34.8–46.1)
HDLC SERPL-MCNC: 45 MG/DL
HGB BLD-MCNC: 14.6 G/DL (ref 11.5–15.4)
IMM GRANULOCYTES # BLD AUTO: 0.01 THOUSAND/UL (ref 0–0.2)
IMM GRANULOCYTES NFR BLD AUTO: 0 % (ref 0–2)
LDLC SERPL CALC-MCNC: 135 MG/DL (ref 0–100)
LYMPHOCYTES # BLD AUTO: 1.56 THOUSANDS/ΜL (ref 0.6–4.47)
LYMPHOCYTES NFR BLD AUTO: 33 % (ref 14–44)
MCH RBC QN AUTO: 30.6 PG (ref 26.8–34.3)
MCHC RBC AUTO-ENTMCNC: 32.2 G/DL (ref 31.4–37.4)
MCV RBC AUTO: 95 FL (ref 82–98)
MONOCYTES # BLD AUTO: 0.5 THOUSAND/ΜL (ref 0.17–1.22)
MONOCYTES NFR BLD AUTO: 11 % (ref 4–12)
NEUTROPHILS # BLD AUTO: 2.48 THOUSANDS/ΜL (ref 1.85–7.62)
NEUTS SEG NFR BLD AUTO: 51 % (ref 43–75)
NONHDLC SERPL-MCNC: 154 MG/DL
NRBC BLD AUTO-RTO: 0 /100 WBCS
PLATELET # BLD AUTO: 259 THOUSANDS/UL (ref 149–390)
PMV BLD AUTO: 9.4 FL (ref 8.9–12.7)
POTASSIUM SERPL-SCNC: 4.2 MMOL/L (ref 3.5–5.3)
PROT SERPL-MCNC: 6.7 G/DL (ref 6.4–8.2)
RBC # BLD AUTO: 4.77 MILLION/UL (ref 3.81–5.12)
SODIUM SERPL-SCNC: 143 MMOL/L (ref 136–145)
T4 FREE SERPL-MCNC: 1.19 NG/DL (ref 0.76–1.46)
TRIGL SERPL-MCNC: 97 MG/DL
TSH SERPL DL<=0.05 MIU/L-ACNC: 1.91 UIU/ML (ref 0.36–3.74)
WBC # BLD AUTO: 4.77 THOUSAND/UL (ref 4.31–10.16)

## 2022-03-16 PROCEDURE — 84443 ASSAY THYROID STIM HORMONE: CPT

## 2022-03-16 PROCEDURE — 82306 VITAMIN D 25 HYDROXY: CPT

## 2022-03-16 PROCEDURE — 36415 COLL VENOUS BLD VENIPUNCTURE: CPT

## 2022-03-16 PROCEDURE — 84439 ASSAY OF FREE THYROXINE: CPT

## 2022-03-16 PROCEDURE — 85025 COMPLETE CBC W/AUTO DIFF WBC: CPT

## 2022-03-16 PROCEDURE — 77067 SCR MAMMO BI INCL CAD: CPT

## 2022-03-16 PROCEDURE — 77063 BREAST TOMOSYNTHESIS BI: CPT

## 2022-03-16 PROCEDURE — 83036 HEMOGLOBIN GLYCOSYLATED A1C: CPT

## 2022-03-16 PROCEDURE — 80053 COMPREHEN METABOLIC PANEL: CPT

## 2022-03-16 PROCEDURE — 80061 LIPID PANEL: CPT

## 2022-03-24 ENCOUNTER — HOSPITAL ENCOUNTER (OUTPATIENT)
Dept: RADIOLOGY | Facility: HOSPITAL | Age: 77
Discharge: HOME | End: 2022-03-24
Attending: INTERNAL MEDICINE

## 2022-04-13 ENCOUNTER — ANESTHESIA (OUTPATIENT)
Dept: GASTROENTEROLOGY | Facility: AMBULARY SURGERY CENTER | Age: 77
End: 2022-04-13

## 2022-04-13 ENCOUNTER — HOSPITAL ENCOUNTER (OUTPATIENT)
Dept: GASTROENTEROLOGY | Facility: AMBULARY SURGERY CENTER | Age: 77
Setting detail: OUTPATIENT SURGERY
Discharge: HOME | End: 2022-04-13
Attending: INTERNAL MEDICINE
Payer: MEDICARE

## 2022-04-13 ENCOUNTER — ANESTHESIA EVENT (OUTPATIENT)
Dept: GASTROENTEROLOGY | Facility: AMBULARY SURGERY CENTER | Age: 77
End: 2022-04-13

## 2022-04-13 VITALS
OXYGEN SATURATION: 99 % | BODY MASS INDEX: 33.04 KG/M2 | DIASTOLIC BLOOD PRESSURE: 66 MMHG | RESPIRATION RATE: 18 BRPM | TEMPERATURE: 97.1 F | HEIGHT: 61 IN | HEART RATE: 55 BPM | WEIGHT: 175 LBS | SYSTOLIC BLOOD PRESSURE: 133 MMHG

## 2022-04-13 DIAGNOSIS — K21.9 GASTROESOPHAGEAL REFLUX DISEASE, UNSPECIFIED WHETHER ESOPHAGITIS PRESENT: ICD-10-CM

## 2022-04-13 DIAGNOSIS — Z86.010 HISTORY OF COLON POLYPS: ICD-10-CM

## 2022-04-13 DIAGNOSIS — K21.00 GASTROESOPHAGEAL REFLUX DISEASE WITH ESOPHAGITIS WITHOUT HEMORRHAGE: Primary | ICD-10-CM

## 2022-04-13 PROBLEM — Z98.890 PONV (POSTOPERATIVE NAUSEA AND VOMITING): Status: ACTIVE | Noted: 2022-04-13

## 2022-04-13 PROBLEM — E03.9 HYPOTHYROIDISM: Status: ACTIVE | Noted: 2022-04-13

## 2022-04-13 PROBLEM — I10 HTN (HYPERTENSION): Status: ACTIVE | Noted: 2022-04-13

## 2022-04-13 PROBLEM — R11.2 PONV (POSTOPERATIVE NAUSEA AND VOMITING): Status: ACTIVE | Noted: 2022-04-13

## 2022-04-13 PROCEDURE — 88305 TISSUE EXAM BY PATHOLOGIST: CPT | Performed by: PATHOLOGY

## 2022-04-13 PROCEDURE — 43239 EGD BIOPSY SINGLE/MULTIPLE: CPT | Performed by: INTERNAL MEDICINE

## 2022-04-13 PROCEDURE — 45380 COLONOSCOPY AND BIOPSY: CPT | Performed by: INTERNAL MEDICINE

## 2022-04-13 RX ORDER — OMEPRAZOLE 20 MG/1
20 CAPSULE, DELAYED RELEASE ORAL 2 TIMES DAILY
Qty: 60 CAPSULE | Refills: 2 | Status: SHIPPED | OUTPATIENT
Start: 2022-04-13 | End: 2022-07-25 | Stop reason: SDUPTHER

## 2022-04-13 RX ORDER — LIDOCAINE HYDROCHLORIDE 10 MG/ML
INJECTION, SOLUTION EPIDURAL; INFILTRATION; INTRACAUDAL; PERINEURAL AS NEEDED
Status: DISCONTINUED | OUTPATIENT
Start: 2022-04-13 | End: 2022-04-13

## 2022-04-13 RX ORDER — SODIUM CHLORIDE, SODIUM LACTATE, POTASSIUM CHLORIDE, CALCIUM CHLORIDE 600; 310; 30; 20 MG/100ML; MG/100ML; MG/100ML; MG/100ML
75 INJECTION, SOLUTION INTRAVENOUS CONTINUOUS
Status: DISCONTINUED | OUTPATIENT
Start: 2022-04-13 | End: 2022-04-17 | Stop reason: HOSPADM

## 2022-04-13 RX ORDER — PROPOFOL 10 MG/ML
INJECTION, EMULSION INTRAVENOUS CONTINUOUS PRN
Status: DISCONTINUED | OUTPATIENT
Start: 2022-04-13 | End: 2022-04-13

## 2022-04-13 RX ORDER — PROPOFOL 10 MG/ML
INJECTION, EMULSION INTRAVENOUS AS NEEDED
Status: DISCONTINUED | OUTPATIENT
Start: 2022-04-13 | End: 2022-04-13

## 2022-04-13 RX ADMIN — PROPOFOL 30 MG: 10 INJECTION, EMULSION INTRAVENOUS at 11:33

## 2022-04-13 RX ADMIN — PROPOFOL 30 MG: 10 INJECTION, EMULSION INTRAVENOUS at 11:30

## 2022-04-13 RX ADMIN — SODIUM CHLORIDE, SODIUM LACTATE, POTASSIUM CHLORIDE, AND CALCIUM CHLORIDE 75 ML/HR: .6; .31; .03; .02 INJECTION, SOLUTION INTRAVENOUS at 10:54

## 2022-04-13 RX ADMIN — PROPOFOL 30 MG: 10 INJECTION, EMULSION INTRAVENOUS at 11:35

## 2022-04-13 RX ADMIN — LIDOCAINE HYDROCHLORIDE 50 MG: 10 INJECTION, SOLUTION EPIDURAL; INFILTRATION; INTRACAUDAL; PERINEURAL at 11:25

## 2022-04-13 RX ADMIN — PROPOFOL 120 MCG/KG/MIN: 10 INJECTION, EMULSION INTRAVENOUS at 11:35

## 2022-04-13 RX ADMIN — PROPOFOL 30 MG: 10 INJECTION, EMULSION INTRAVENOUS at 11:28

## 2022-04-13 RX ADMIN — PROPOFOL 100 MG: 10 INJECTION, EMULSION INTRAVENOUS at 11:25

## 2022-04-13 NOTE — ANESTHESIA POSTPROCEDURE EVALUATION
Post-Op Assessment Note    CV Status:  Stable  Pain Score: 0    Pain management: adequate     Mental Status:  Sleepy   Hydration Status:  Stable   PONV Controlled:  None   Airway Patency:  Patent   Two or more mitigation strategies used for obstructive sleep apnea   Post Op Vitals Reviewed: Yes      Staff: CRNA         No complications documented      BP   117/65   Temp 97   Pulse 75   Resp 16   SpO2 99

## 2022-04-13 NOTE — H&P
History and Physical - [facility]  Shauna Vail 78 y o  female MRN: [id number]                  HPI: Shauna Vail is a 78y o  year old female who presents for EGD and colonoscopy due to history of reflux, question pre Barretts, history of colon polyps type unknown last colonoscopy 5 years ago      REVIEW OF SYSTEMS: Per the HPI, and otherwise unremarkable  Historical Information   Past Medical History:   Diagnosis Date    Arthritis     left knee injection-2017    Disease of thyroid gland     GERD (gastroesophageal reflux disease)     Hypertension     Seasonal allergies     Wears glasses      Past Surgical History:   Procedure Laterality Date    BREAST BIOPSY Left 2014    BREAST SURGERY Left     x2 bx-benign     SECTION  1966    x1    COLONOSCOPY      age 74    COLONOSCOPY N/A 3/6/2017    Procedure: COLONOSCOPY;  Surgeon: Yenifer Delvalle MD;  Location: [place] [address] GI LAB; Service:     ESOPHAGOGASTRODUODENOSCOPY N/A 3/6/2017    Procedure: ESOPHAGOGASTRODUODENOSCOPY (EGD); Surgeon: Yenifer Delvalle MD;  Location: [facility] GI LAB; Service:     ESOPHAGOGASTRODUODENOSCOPY N/A 2017    Procedure: ESOPHAGOGASTRODUODENOSCOPY (EGD); Surgeon: Yenifer Delvalle MD;  Location: [facility] GI LAB; Service: Gastroenterology    HERNIA REPAIR      incisional hernia right side    HYSTERECTOMY  1966    partial- ovaries remain    KNEE ARTHROSCOPY Left     torn meniscus    LIPOMA RESECTION      lower right abdomen    CT EGD TRANSORAL BIOPSY SINGLE/MULTIPLE N/A 2018    Procedure: ESOPHAGOGASTRODUODENOSCOPY (EGD); Surgeon: Yenifer Delvalle MD;  Location: [facility] GI LAB;   Service: Gastroenterology    ROTATOR CUFF REPAIR Left     SHOULDER ARTHROSCOPY W/ ROTATOR CUFF REPAIR Left     TRIGGER FINGER RELEASE Right     thumb     Social History   Social History     Substance and Sexual Activity   Alcohol Use No     Social History     Substance and Sexual Activity   Drug Use No     Social History Tobacco Use   Smoking Status Never Smoker   Smokeless Tobacco Never Used     Family History   Problem Relation Age of Onset    Hepatitis Brother         hep C from [place]       Meds/Allergies       Current Outpatient Medications:     Cholecalciferol (VITAMIN D PO)    levothyroxine 25 mcg tablet    metoprolol tartrate (LOPRESSOR) 25 mg tablet    Current Facility-Administered Medications:     lactated ringers infusion, 75 mL/hr, Intravenous, Continuous    No Known Allergies    Objective     /68   Pulse 69   Temp (!) 97 1 °F (36 2 °C) (Probe)   Resp 18   Ht 5' 1" (1 549 m)   Wt 79 4 kg (175 lb)   SpO2 95%   BMI 33 07 kg/m²       PHYSICAL EXAM    Gen: NAD  Head: NCAT  CV: RRR  CHEST: Clear  ABD: soft, NT/ND  EXT: no edema      ASSESSMENT/PLAN:  This is a 78y o  year old female here for EGD and colonoscopy, and she is stable and optimized for her procedure

## 2022-04-13 NOTE — ANESTHESIA PREPROCEDURE EVALUATION
Procedure:  COLONOSCOPY  EGD    Relevant Problems   ANESTHESIA   (+) PONV (postoperative nausea and vomiting)      CARDIO   (+) HTN (hypertension)      ENDO   (+) Hypothyroidism      GI/HEPATIC   (+) Gastroesophageal reflux disease        Physical Exam    Airway    Mallampati score: II  TM Distance: >3 FB  Neck ROM: full     Dental       Cardiovascular  Rhythm: regular, Rate: normal,     Pulmonary  Breath sounds clear to auscultation,     Other Findings        Anesthesia Plan  ASA Score- 2     Anesthesia Type- IV sedation with anesthesia with ASA Monitors  Additional Monitors:   Airway Plan:           Plan Factors-    Chart reviewed  Patient is not a current smoker  Induction- intravenous  Postoperative Plan-     Informed Consent- Anesthetic plan and risks discussed with patient  I personally reviewed this patient with the CRNA  Discussed and agreed on the Anesthesia Plan with the CRNA  Francisco Tucker

## 2022-04-14 ENCOUNTER — TELEPHONE (OUTPATIENT)
Dept: GASTROENTEROLOGY | Facility: CLINIC | Age: 77
End: 2022-04-14

## 2022-04-14 NOTE — TELEPHONE ENCOUNTER
I called patient  She had questions regarding her procedure recommendations  I went over all recommendations and answered all questions and concerns  Patient verbalized understanding   Thank you

## 2022-04-14 NOTE — TELEPHONE ENCOUNTER
Pt called and lmom asking for a call back  She had a colon with Dr Severo Cocks yesterday and would like to speak with someone about it  Can you please call her back  Thank you

## 2022-04-20 ENCOUNTER — TELEPHONE (OUTPATIENT)
Dept: GASTROENTEROLOGY | Facility: CLINIC | Age: 77
End: 2022-04-20

## 2022-04-20 NOTE — TELEPHONE ENCOUNTER
Patient called for her bx results  I advised patient that the bx results are not back yet   Thank you

## 2022-05-03 ENCOUNTER — PREP FOR PROCEDURE (OUTPATIENT)
Dept: GASTROENTEROLOGY | Facility: CLINIC | Age: 77
End: 2022-05-03

## 2022-05-03 ENCOUNTER — TELEPHONE (OUTPATIENT)
Dept: GASTROENTEROLOGY | Facility: CLINIC | Age: 77
End: 2022-05-03

## 2022-05-03 DIAGNOSIS — K21.00 GASTROESOPHAGEAL REFLUX DISEASE WITH ESOPHAGITIS WITHOUT HEMORRHAGE: Primary | ICD-10-CM

## 2022-05-03 NOTE — TELEPHONE ENCOUNTER
AURA Darling 82  22  Referring: Dr. Elly Lynn CONSULT/CHIEF COMPLAINT/HPI     Reason for visit/ Chief complaint  Follow up  Shortness of breath   HPI Barb Rubin is a 64 y.o. seen as a follow up for shortness of breath and lvh, grade one diastolic dysfunction, KARTHIK. She has a history of asthma, gestational diabetes, and is now prediabetic. In   She had a CT scan that found an incidental anterior mediastinal mass. She was evaluated by Dr Cuong Thorpe, and she will have reimaging She is an  for a dental practice. She has a new boyfriend of 3 months. In the interval since her last visit, she had plain gxt with pulse ox that was normal. PFT showed possible obstructive lung disease with reduced diffusion capacity. Her shortness of breath resolved, attributes this to post covid symptoms. Today she is feeling well. She has no chest pain, shortness of breath palpitations, dizziness. She walks her dog for exercise, would like to get more active. Weight is down 4  Pounds since last visit    Patient is compliant with medications and is tolerating them well without side effects     HISTORY/ALLERGIES/ROS     MedHx:  has a past medical history of Chronic depression, DDD (degenerative disc disease), lumbar, Fatty liver, Fibromyalgia, Hiatal hernia, HTN (hypertension), Obesity (BMI 30.0-34.9), KARTHIK (obstructive sleep apnea), Pneumonia, and Psoriatic arthritis ([facility] [address] SurgHx:  has a past surgical history that includes  section (1987); Cholecystectomy (); Nasal septum surgery (); bladder suspension (); turbinate resection (); Hysterectomy, total abdominal (); Endometrial ablation (); Nerve Surgery (Bilateral, 2019); and hiatal hernia repair (N/A, 2021). SocHx:  reports that she has never smoked. She has never used smokeless tobacco. She reports current alcohol use. She reports current drug use. Drug: Marijuana Lanis Miriam). Patient returned call, Schedule EGD with Dr Ana Maria Romano @ [facility] on 7/25/22 FamHx: Grandparents with heart disease  Allergies: Morphine and Norco [hydrocodone-acetaminophen]   ROS:   Review of Systems   Constitutional: Positive for fatigue. Negative for activity change, diaphoresis and fever. HENT: Negative for congestion and ear discharge. Eyes: Negative for photophobia and visual disturbance. Respiratory: Positive for shortness of breath. Negative for cough and chest tightness. Cardiovascular: Negative for chest pain and palpitations. Gastrointestinal: Negative for abdominal distention, abdominal pain, blood in stool and nausea. Endocrine: Negative for cold intolerance and polydipsia. Genitourinary: Negative for difficulty urinating and flank pain. Musculoskeletal: Positive for arthralgias and myalgias. Skin: Negative for rash and wound. Allergic/Immunologic: Negative for environmental allergies and immunocompromised state. Neurological: Negative for dizziness and headaches. Hematological: Negative for adenopathy. Does not bruise/bleed easily. Psychiatric/Behavioral: Negative for confusion. The patient is not hyperactive. MEDICATIONS      Prior to Admission medications    Medication Sig Start Date End Date Taking?  Authorizing Provider   methotrexate (RHEUMATREX) 2.5 MG chemo tablet TAKE 5 TABLETS BY MOUTH ONE TIME PER WEEK 1/25/22  Yes Belinda Whitlock MD   folic acid (FOLVITE) 1 MG tablet Take 1 tablet by mouth daily 1/25/22  Yes Belinda Whitlock MD   sulfaSALAzine (AZULFIDINE) 500 MG tablet Take 2 tablets by mouth 2 times daily 1/25/22 4/25/22 Yes Belinda Whitlock MD   pilocarpine (SALAGEN) 5 MG tablet Take 1 tablet by mouth 3 times daily 1/25/22 4/25/22 Yes Belinda Whitlock MD   DULoxetine (CYMBALTA) 30 MG extended release capsule TAKE 1 CAPSULE DAILY, TAKE WITH 60MG CYMBALTA 1/17/22  Yes Marlene Rolle, DO   albuterol sulfate HFA (PROVENTIL HFA) 108 (90 Base) MCG/ACT inhaler Inhale 2 puffs into the lungs every 4 hours as needed for Wheezing or Shortness of Breath 21  Yes Rollin Apgar, APRN - CNP   SUMAtriptan (IMITREX) 100 MG tablet Take 1 tablet by mouth once as needed for Migraine May repeat once after 2 hours if needed. No more than 2 per 24 hour period. 21  Yes Adriane Cardona DO   amitriptyline (ELAVIL) 50 MG tablet Take 1 tablet by mouth nightly May take an additional 50 mg prn insomnia 21  Yes Adriane Cardona DO   busPIRone (BUSPAR) 5 MG tablet Take 1 tablet by mouth 3 times daily as needed (anxiety) 21  Yes Adriane Cardona DO   olmesartan (BENICAR) 20 MG tablet Take 0.5 tablets by mouth daily 21  Yes Adriane Cardona DO   DULoxetine (CYMBALTA) 60 MG extended release capsule Take 1 capsule by mouth daily Take with 30 mg  Patient taking differently: Take 60 mg by mouth nightly  2/10/21  Yes Adriane Cardona DO   metoprolol tartrate (LOPRESSOR) 50 MG tablet Take 1 tablet by mouth 2 times daily 21  Yes Adriane Cardona DO   Cholecalciferol (VITAMIN D3) 2000 units CAPS Take by mouth   Yes Historical Provider, MD       PHYSICAL EXAM        Vitals:    22 0846   BP: 118/76   Pulse: 71   SpO2: 96%    Weight: 192 lb 11.2 oz (87.4 kg)     Gen Alert, cooperative, no distress Heart  Sinus bradycardia. No murmur   Head Normocephalic, atraumatic, no abnormalities Abd  Soft, NT, +BS, no mass, no OM   Eyes PERRLA, conj/corn clear Ext  Ext nl, AT, no C/C, no edema   Nose Nares normal, no drain age, Non-tender Pulse 2+ and symmetric   Throat Lips, mucosa, tongue normal Skin Color/text/turg nl, no rash/lesions   Neck S/S, TM, NT, no bruit Psych Nl mood and affect   Lung  CTA-B, unlabored, no DTP     Ch wall NT, no deform       LABS and Imaging     Relevant and available CV data reviewed  Echo/MRI: 2021 Left ventricular systolic function is normal with ejection fraction   estimated at 55-60 %.   -No regional wall motion abnormalities are noted.    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 KARTHIK  - prescribed cpap, but unable to wear it  - diagnosed years ago, 'mild\"  - previous three nasal surgeries  - stable  Plan  - not interested in getting repeat sleep study at this time  - sleeps on side with much less snoring    5. Diabetes  - gestational diabetes  Plan  - recommend exercise 150 minutes a week  - not interested in seeing a dietician  - higher risk for cad and stroke    6. Mixed Hyperlipidemia  - 6/11/2021  Tc 182 hdl 52 ldl 111 tri 95  Plan  - dietary management at this time given low risk ascvd 2.7%, will continue dietary management  - will repeat lipid panel with pcp visit. The 10-year ASCVD risk score (Estephania Melo, et al., 2013) is: 2.7%    Values used to calculate the score:      Age: 64 years      Sex: Female      Is Non- : No      Diabetic: No      Tobacco smoker: No      Systolic Blood Pressure: 076 mmHg      Is BP treated: Yes      HDL Cholesterol: 52 mg/dL      Total Cholesterol: 182 mg/dL    7 Sjogrens  - on pilocarpine  - management per Dr Arti Montes    8. Reported Psoriatic arthritis  - on rheumatrex  Plan  - per Dr Arti Montes        Patient counseled on lifestyle modification, diet, and exercise. Follow Up:   12 months    Dr. Sarah Ayala:  I, Giovani Potts, am scribing for and in the presence of Carmen Verde MD.   Doni Granger 02/07/22 9:27 AM   Physician Attestation  The scribe for and in the presence of milton Hill DO). The scribe Easton Lopez may have prepopulated components of this note with my historical  intellectual property under my direct supervision. Any additions to this intellectual property were performed in my presence and at my direction.   Furthermore, the content and accuracy of this note have been reviewed by me Marilynn Price DO [place]).  2/7/2022 9:27 AM

## 2022-05-03 NOTE — TELEPHONE ENCOUNTER
Called and lmom asking pt to call back to schedule repeat EGD in 2 months  She had last done @ [address]  The next open date Dr Ji Shaffer has right now is 7/21  If pt calls back see if that date will work for her and get her scheduled  I will try calling again in 1 week

## 2022-05-03 NOTE — TELEPHONE ENCOUNTER
----- Message from Ling Daugherty LPN sent at 7/7/8654  3:06 PM EDT -----  Pt informed of results and recommendation for colon 3 years and EGD 2 months  Recall entered and request to schedule EGD 2 months sent to the scheduling dept  Dr Richard Haque do you want a recall for EGD 1 year for new iglesias's also?

## 2022-07-15 ENCOUNTER — APPOINTMENT (OUTPATIENT)
Dept: LAB | Facility: HOSPITAL | Age: 77
End: 2022-07-15
Payer: MEDICARE

## 2022-07-15 DIAGNOSIS — I51.9 MYXEDEMA HEART DISEASE: ICD-10-CM

## 2022-07-15 DIAGNOSIS — E78.00 PURE HYPERCHOLESTEROLEMIA: ICD-10-CM

## 2022-07-15 DIAGNOSIS — E55.9 AVITAMINOSIS D: ICD-10-CM

## 2022-07-15 DIAGNOSIS — E13.69 OTHER SPECIFIED DIABETES MELLITUS WITH OTHER SPECIFIED COMPLICATION, WITHOUT LONG-TERM CURRENT USE OF INSULIN (HCC): ICD-10-CM

## 2022-07-15 DIAGNOSIS — N39.0 URINARY TRACT INFECTION WITHOUT HEMATURIA, SITE UNSPECIFIED: ICD-10-CM

## 2022-07-15 DIAGNOSIS — F64.9 GENDER DYSPHORIA: ICD-10-CM

## 2022-07-15 DIAGNOSIS — O10.011 PRE-EXISTING ESSENTIAL HYPERTENSION COMPLICATING PREGNANCY IN FIRST TRIMESTER: ICD-10-CM

## 2022-07-15 DIAGNOSIS — E03.9 MYXEDEMA HEART DISEASE: ICD-10-CM

## 2022-07-15 LAB
ALBUMIN SERPL BCP-MCNC: 3.5 G/DL (ref 3.5–5)
ALP SERPL-CCNC: 72 U/L (ref 46–116)
ALT SERPL W P-5'-P-CCNC: 18 U/L (ref 12–78)
ANION GAP SERPL CALCULATED.3IONS-SCNC: 10 MMOL/L (ref 4–13)
AST SERPL W P-5'-P-CCNC: 18 U/L (ref 5–45)
BASOPHILS # BLD AUTO: 0.05 THOUSANDS/ΜL (ref 0–0.1)
BASOPHILS NFR BLD AUTO: 1 % (ref 0–1)
BILIRUB SERPL-MCNC: 0.48 MG/DL (ref 0.2–1)
BUN SERPL-MCNC: 20 MG/DL (ref 5–25)
CALCIUM SERPL-MCNC: 8.1 MG/DL (ref 8.3–10.1)
CHLORIDE SERPL-SCNC: 106 MMOL/L (ref 100–108)
CHOLEST SERPL-MCNC: 204 MG/DL
CO2 SERPL-SCNC: 27 MMOL/L (ref 21–32)
CREAT SERPL-MCNC: 1.1 MG/DL (ref 0.6–1.3)
EOSINOPHIL # BLD AUTO: 0.21 THOUSAND/ΜL (ref 0–0.61)
EOSINOPHIL NFR BLD AUTO: 4 % (ref 0–6)
ERYTHROCYTE [DISTWIDTH] IN BLOOD BY AUTOMATED COUNT: 12.5 % (ref 11.6–15.1)
EST. AVERAGE GLUCOSE BLD GHB EST-MCNC: 114 MG/DL
GFR SERPL CREATININE-BSD FRML MDRD: 49 ML/MIN/1.73SQ M
GLUCOSE P FAST SERPL-MCNC: 106 MG/DL (ref 65–99)
HBA1C MFR BLD: 5.6 %
HCT VFR BLD AUTO: 45 % (ref 34.8–46.1)
HDLC SERPL-MCNC: 51 MG/DL
HGB BLD-MCNC: 14.5 G/DL (ref 11.5–15.4)
IMM GRANULOCYTES # BLD AUTO: 0.01 THOUSAND/UL (ref 0–0.2)
IMM GRANULOCYTES NFR BLD AUTO: 0 % (ref 0–2)
LDLC SERPL CALC-MCNC: 136 MG/DL (ref 0–100)
LYMPHOCYTES # BLD AUTO: 1.68 THOUSANDS/ΜL (ref 0.6–4.47)
LYMPHOCYTES NFR BLD AUTO: 34 % (ref 14–44)
MCH RBC QN AUTO: 30.3 PG (ref 26.8–34.3)
MCHC RBC AUTO-ENTMCNC: 32.2 G/DL (ref 31.4–37.4)
MCV RBC AUTO: 94 FL (ref 82–98)
MONOCYTES # BLD AUTO: 0.51 THOUSAND/ΜL (ref 0.17–1.22)
MONOCYTES NFR BLD AUTO: 10 % (ref 4–12)
NEUTROPHILS # BLD AUTO: 2.5 THOUSANDS/ΜL (ref 1.85–7.62)
NEUTS SEG NFR BLD AUTO: 51 % (ref 43–75)
NONHDLC SERPL-MCNC: 153 MG/DL
NRBC BLD AUTO-RTO: 0 /100 WBCS
PLATELET # BLD AUTO: 248 THOUSANDS/UL (ref 149–390)
PMV BLD AUTO: 9.3 FL (ref 8.9–12.7)
POTASSIUM SERPL-SCNC: 4.1 MMOL/L (ref 3.5–5.3)
PROT SERPL-MCNC: 6.7 G/DL (ref 6.4–8.2)
RBC # BLD AUTO: 4.79 MILLION/UL (ref 3.81–5.12)
SODIUM SERPL-SCNC: 143 MMOL/L (ref 136–145)
T4 FREE SERPL-MCNC: 1.02 NG/DL (ref 0.76–1.46)
TRIGL SERPL-MCNC: 83 MG/DL
TSH SERPL DL<=0.05 MIU/L-ACNC: 3.1 UIU/ML (ref 0.45–4.5)
WBC # BLD AUTO: 4.96 THOUSAND/UL (ref 4.31–10.16)

## 2022-07-15 PROCEDURE — 80061 LIPID PANEL: CPT

## 2022-07-15 PROCEDURE — 84439 ASSAY OF FREE THYROXINE: CPT

## 2022-07-15 PROCEDURE — 80053 COMPREHEN METABOLIC PANEL: CPT

## 2022-07-15 PROCEDURE — 36415 COLL VENOUS BLD VENIPUNCTURE: CPT

## 2022-07-15 PROCEDURE — 85025 COMPLETE CBC W/AUTO DIFF WBC: CPT

## 2022-07-15 PROCEDURE — 84443 ASSAY THYROID STIM HORMONE: CPT

## 2022-07-15 PROCEDURE — 83036 HEMOGLOBIN GLYCOSYLATED A1C: CPT

## 2022-07-20 ENCOUNTER — TELEPHONE (OUTPATIENT)
Dept: GASTROENTEROLOGY | Facility: CLINIC | Age: 77
End: 2022-07-20

## 2022-07-20 NOTE — TELEPHONE ENCOUNTER
Called and spoke to pt confirming her EGD scheduled with Dr Leopoldo Blew at [facility] on 7/25/22  Informed pt to be npo after midnight and that she will need a  the day of the procedure  Advised pt to contact her insurance if she has any questions regarding coverage of her procedure

## 2022-07-25 ENCOUNTER — ANESTHESIA (OUTPATIENT)
Dept: GASTROENTEROLOGY | Facility: AMBULARY SURGERY CENTER | Age: 77
End: 2022-07-25

## 2022-07-25 ENCOUNTER — HOSPITAL ENCOUNTER (OUTPATIENT)
Dept: GASTROENTEROLOGY | Facility: AMBULARY SURGERY CENTER | Age: 77
Setting detail: OUTPATIENT SURGERY
Discharge: HOME | End: 2022-07-25
Attending: INTERNAL MEDICINE | Admitting: INTERNAL MEDICINE
Payer: MEDICARE

## 2022-07-25 ENCOUNTER — ANESTHESIA EVENT (OUTPATIENT)
Dept: GASTROENTEROLOGY | Facility: AMBULARY SURGERY CENTER | Age: 77
End: 2022-07-25

## 2022-07-25 VITALS
SYSTOLIC BLOOD PRESSURE: 124 MMHG | DIASTOLIC BLOOD PRESSURE: 67 MMHG | TEMPERATURE: 96.2 F | HEART RATE: 56 BPM | RESPIRATION RATE: 18 BRPM | OXYGEN SATURATION: 96 %

## 2022-07-25 DIAGNOSIS — K22.70 BARRETT'S ESOPHAGUS DETERMINED BY BIOPSY: ICD-10-CM

## 2022-07-25 DIAGNOSIS — K31.7 GASTRIC POLYP: ICD-10-CM

## 2022-07-25 DIAGNOSIS — K44.9 HIATAL HERNIA: ICD-10-CM

## 2022-07-25 DIAGNOSIS — K21.00 GASTROESOPHAGEAL REFLUX DISEASE WITH ESOPHAGITIS WITHOUT HEMORRHAGE: ICD-10-CM

## 2022-07-25 PROCEDURE — 88305 TISSUE EXAM BY PATHOLOGIST: CPT | Performed by: PATHOLOGY

## 2022-07-25 PROCEDURE — 43239 EGD BIOPSY SINGLE/MULTIPLE: CPT | Performed by: INTERNAL MEDICINE

## 2022-07-25 RX ORDER — LIDOCAINE HYDROCHLORIDE 10 MG/ML
INJECTION, SOLUTION EPIDURAL; INFILTRATION; INTRACAUDAL; PERINEURAL AS NEEDED
Status: DISCONTINUED | OUTPATIENT
Start: 2022-07-25 | End: 2022-07-25

## 2022-07-25 RX ORDER — PROPOFOL 10 MG/ML
INJECTION, EMULSION INTRAVENOUS AS NEEDED
Status: DISCONTINUED | OUTPATIENT
Start: 2022-07-25 | End: 2022-07-25

## 2022-07-25 RX ORDER — SODIUM CHLORIDE, SODIUM LACTATE, POTASSIUM CHLORIDE, CALCIUM CHLORIDE 600; 310; 30; 20 MG/100ML; MG/100ML; MG/100ML; MG/100ML
125 INJECTION, SOLUTION INTRAVENOUS CONTINUOUS
Status: DISCONTINUED | OUTPATIENT
Start: 2022-07-25 | End: 2022-07-29 | Stop reason: HOSPADM

## 2022-07-25 RX ORDER — SODIUM CHLORIDE, SODIUM LACTATE, POTASSIUM CHLORIDE, CALCIUM CHLORIDE 600; 310; 30; 20 MG/100ML; MG/100ML; MG/100ML; MG/100ML
INJECTION, SOLUTION INTRAVENOUS CONTINUOUS PRN
Status: DISCONTINUED | OUTPATIENT
Start: 2022-07-25 | End: 2022-07-25

## 2022-07-25 RX ORDER — OMEPRAZOLE 20 MG/1
CAPSULE, DELAYED RELEASE ORAL
Qty: 90 CAPSULE | Refills: 1 | Status: SHIPPED | OUTPATIENT
Start: 2022-07-25

## 2022-07-25 RX ADMIN — PROPOFOL 20 MG: 10 INJECTION, EMULSION INTRAVENOUS at 08:31

## 2022-07-25 RX ADMIN — LIDOCAINE HYDROCHLORIDE 20 MG: 10 INJECTION, SOLUTION EPIDURAL; INFILTRATION; INTRACAUDAL; PERINEURAL at 08:24

## 2022-07-25 RX ADMIN — SODIUM CHLORIDE, SODIUM LACTATE, POTASSIUM CHLORIDE, AND CALCIUM CHLORIDE: .6; .31; .03; .02 INJECTION, SOLUTION INTRAVENOUS at 08:00

## 2022-07-25 RX ADMIN — PROPOFOL 20 MG: 10 INJECTION, EMULSION INTRAVENOUS at 08:27

## 2022-07-25 RX ADMIN — PROPOFOL 50 MG: 10 INJECTION, EMULSION INTRAVENOUS at 08:24

## 2022-07-25 NOTE — ANESTHESIA POSTPROCEDURE EVALUATION
Post-Op Assessment Note    CV Status:  Stable  Pain Score: 0    Pain management: adequate     Mental Status:  Alert and awake   Hydration Status:  Euvolemic   PONV Controlled:  Controlled   Airway Patency:  Patent      Post Op Vitals Reviewed: Yes      Staff: CRNA, Anesthesiologist   Comments: vss        No complications documented      BP   137/66   Temp     Pulse  70   Resp   16   SpO2   99

## 2022-07-25 NOTE — ANESTHESIA PREPROCEDURE EVALUATION
Procedure:  EGD    Relevant Problems   ANESTHESIA   (+) PONV (postoperative nausea and vomiting)      CARDIO   (+) HTN (hypertension)      ENDO   (+) Hypothyroidism      GI/HEPATIC   (+) Gastroesophageal reflux disease        Physical Exam    Airway    Mallampati score: II  TM Distance: >3 FB  Neck ROM: full     Dental   No notable dental hx     Cardiovascular  Cardiovascular exam normal    Pulmonary  Pulmonary exam normal     Other Findings        Anesthesia Plan  ASA Score- 2     Anesthesia Type- IV sedation with anesthesia with ASA Monitors  Additional Monitors:   Airway Plan:           Plan Factors-Exercise tolerance (METS): >4 METS  Chart reviewed  Imaging results reviewed  Existing labs reviewed  Patient summary reviewed  Patient is not a current smoker  Induction-     Postoperative Plan-     Informed Consent- Anesthetic plan and risks discussed with patient  I personally reviewed this patient with the CRNA  Discussed and agreed on the Anesthesia Plan with the CRNA  Jennifer Bustamante

## 2022-07-25 NOTE — H&P
History and Physical - [facility]  Marco Donnelly 78 y o  female MRN: [id number]                  HPI: Marco Donnelly is a 78y o  year old female who presents for upper endoscopy to ensure healing of ulcerative esophagitis and for biopsies secondary to intestinal metaplasia lower esophagus      REVIEW OF SYSTEMS: Per the HPI, and otherwise unremarkable  Historical Information   Past Medical History:   Diagnosis Date    Arthritis     left knee injection-2017    Disease of thyroid gland     GERD (gastroesophageal reflux disease)     Hypertension     Seasonal allergies     Wears glasses      Past Surgical History:   Procedure Laterality Date    BREAST BIOPSY Left 2014    BREAST SURGERY Left     x2 bx-benign     SECTION  1966    x1    COLONOSCOPY      age 74    COLONOSCOPY N/A 3/6/2017    Procedure: COLONOSCOPY;  Surgeon: Tree Bear MD;  Location: [place] [address] GI LAB; Service:     ESOPHAGOGASTRODUODENOSCOPY N/A 3/6/2017    Procedure: ESOPHAGOGASTRODUODENOSCOPY (EGD); Surgeon: Tree Bear MD;  Location: [facility] GI LAB; Service:     ESOPHAGOGASTRODUODENOSCOPY N/A 2017    Procedure: ESOPHAGOGASTRODUODENOSCOPY (EGD); Surgeon: Tree Bear MD;  Location: [facility] GI LAB; Service: Gastroenterology    HERNIA REPAIR      incisional hernia right side    HYSTERECTOMY  1966    partial- ovaries remain    KNEE ARTHROSCOPY Left     torn meniscus    LIPOMA RESECTION      lower right abdomen    DC EGD TRANSORAL BIOPSY SINGLE/MULTIPLE N/A 2018    Procedure: ESOPHAGOGASTRODUODENOSCOPY (EGD); Surgeon: Tree Bear MD;  Location: [facility] GI LAB;   Service: Gastroenterology    ROTATOR CUFF REPAIR Left     SHOULDER ARTHROSCOPY W/ ROTATOR CUFF REPAIR Left     TRIGGER FINGER RELEASE Right     thumb     Social History   Social History     Substance and Sexual Activity   Alcohol Use No     Social History     Substance and Sexual Activity   Drug Use No     Social History Tobacco Use   Smoking Status Never Smoker   Smokeless Tobacco Never Used     Family History   Problem Relation Age of Onset    Hepatitis Brother         hep C from [place]       Meds/Allergies       Current Outpatient Medications:     Cholecalciferol (VITAMIN D PO)    levothyroxine 25 mcg tablet    metoprolol tartrate (LOPRESSOR) 25 mg tablet    omeprazole (PriLOSEC) 20 mg delayed release capsule    Current Facility-Administered Medications:     lactated ringers infusion, 125 mL/hr, Intravenous, Continuous    Facility-Administered Medications Ordered in Other Encounters:     lactated ringers infusion, , Intravenous, Continuous PRN, New Bag at 07/25/22 0800    No Known Allergies    Objective     /81   Pulse 60   Temp (!) 96 2 °F (35 7 °C) (Temporal)   Resp 16   SpO2 96%       PHYSICAL EXAM    Gen: NAD  Head: NCAT  CV: RRR  CHEST: Clear  ABD: soft, NT/ND  EXT: no edema      ASSESSMENT/PLAN:  This is a 78y o  year old female here for upper endoscopy, and she is stable and optimized for her procedure

## 2022-08-03 NOTE — RESULT ENCOUNTER NOTE
Please inform patient that their biopsies were benign    Repeat EGD in 1 year secondary to pre Moncada's

## 2022-09-15 ENCOUNTER — OFFICE VISIT (OUTPATIENT)
Dept: OBGYN CLINIC | Facility: CLINIC | Age: 77
End: 2022-09-15
Payer: MEDICARE

## 2022-09-15 ENCOUNTER — APPOINTMENT (OUTPATIENT)
Dept: RADIOLOGY | Facility: CLINIC | Age: 77
End: 2022-09-15
Payer: MEDICARE

## 2022-09-15 VITALS
HEIGHT: 61 IN | BODY MASS INDEX: 34.74 KG/M2 | SYSTOLIC BLOOD PRESSURE: 140 MMHG | DIASTOLIC BLOOD PRESSURE: 80 MMHG | WEIGHT: 184 LBS | HEART RATE: 80 BPM

## 2022-09-15 DIAGNOSIS — M25.562 CHRONIC PAIN OF LEFT KNEE: ICD-10-CM

## 2022-09-15 DIAGNOSIS — G89.29 CHRONIC PAIN OF RIGHT KNEE: ICD-10-CM

## 2022-09-15 DIAGNOSIS — M25.561 CHRONIC PAIN OF RIGHT KNEE: ICD-10-CM

## 2022-09-15 DIAGNOSIS — G89.29 CHRONIC PAIN OF BOTH KNEES: ICD-10-CM

## 2022-09-15 DIAGNOSIS — G89.29 CHRONIC PAIN OF LEFT KNEE: ICD-10-CM

## 2022-09-15 DIAGNOSIS — M17.0 PRIMARY OSTEOARTHRITIS OF BOTH KNEES: Primary | ICD-10-CM

## 2022-09-15 DIAGNOSIS — M25.561 CHRONIC PAIN OF BOTH KNEES: ICD-10-CM

## 2022-09-15 DIAGNOSIS — M25.562 CHRONIC PAIN OF BOTH KNEES: ICD-10-CM

## 2022-09-15 PROCEDURE — 99204 OFFICE O/P NEW MOD 45 MIN: CPT | Performed by: ORTHOPAEDIC SURGERY

## 2022-09-15 PROCEDURE — 20610 DRAIN/INJ JOINT/BURSA W/O US: CPT | Performed by: ORTHOPAEDIC SURGERY

## 2022-09-15 PROCEDURE — 73562 X-RAY EXAM OF KNEE 3: CPT

## 2022-09-15 RX ORDER — BUPIVACAINE HYDROCHLORIDE 5 MG/ML
6 INJECTION, SOLUTION EPIDURAL; INTRACAUDAL
Status: COMPLETED | OUTPATIENT
Start: 2022-09-15 | End: 2022-09-15

## 2022-09-15 RX ORDER — TRIAMCINOLONE ACETONIDE 40 MG/ML
80 INJECTION, SUSPENSION INTRA-ARTICULAR; INTRAMUSCULAR
Status: COMPLETED | OUTPATIENT
Start: 2022-09-15 | End: 2022-09-15

## 2022-09-15 RX ADMIN — BUPIVACAINE HYDROCHLORIDE 6 ML: 5 INJECTION, SOLUTION EPIDURAL; INTRACAUDAL at 07:40

## 2022-09-15 RX ADMIN — TRIAMCINOLONE ACETONIDE 80 MG: 40 INJECTION, SUSPENSION INTRA-ARTICULAR; INTRAMUSCULAR at 07:40

## 2022-09-15 NOTE — PROGRESS NOTES
Assessment/Plan:  1  Primary osteoarthritis of both knees  XR knee 3 vw left non injury    XR knee 3 vw right non injury    Large joint arthrocentesis: bilateral knee   2  Chronic pain of both knees  XR knee 3 vw left non injury    XR knee 3 vw right non injury    Large joint arthrocentesis: bilateral knee     Scribe Attestation    I,:  Sarah Crespo am acting as a scribe while in the presence of the attending physician :       I,:  Nino Fernandez DO personally performed the services described in this documentation    as scribed in my presence :         Bruna Whittaker is a pleasant 70-year-old female who presents today for evaluation of her bilateral knees  After reviewing her imaging and performing a thorough history and physical exam I explained that she is symptomatic of her end-stage underlying osteoarthritis  We discussed treatment options today including corticosteroid and viscosupplementation injection therapy  I offered to perform bilateral knee corticosteroid injection for her today  She consented to and underwent the injections as documented below without issue or complication  They were well tolerated by the patient  Post injection instructions were provided  If these provide less than 6 weeks worth of relief for her, she will call the office and we will graduate to viscosupplementation injection therapy  Otherwise, we will see her back as needed  Large joint arthrocentesis: bilateral knee  Universal Protocol:  Consent: Verbal consent obtained    Risks and benefits: risks, benefits and alternatives were discussed  Consent given by: patient  Patient understanding: patient states understanding of the procedure being performed  Site marked: the operative site was marked  Patient identity confirmed: verbally with patient    Supporting Documentation  Indications: pain   Procedure Details  Location: knee - bilateral knee  Preparation: Patient was prepped and draped in the usual sterile fashion  Needle size: 20 G  Ultrasound guidance: no  Approach: anterolateral    Medications (Right): 6 mL bupivacaine (PF) 0 5 %; 80 mg triamcinolone acetonide 40 mg/mLMedications (Left): 6 mL bupivacaine (PF) 0 5 %; 80 mg triamcinolone acetonide 40 mg/mL   Patient tolerance: patient tolerated the procedure well with no immediate complications  Dressing:  Sterile dressing applied          Subjective: Follow-up evaluation for bilateral knee pain    Patient ID: Peter Tolbert is a 78 y o  female who presents today for evaluation of her bilateral knees  She was last seen 5 years ago and underwent corticosteroid injection at that time  She believes this was beneficial for her but she does not recall how long the injection helped to relieve her pain  At today's visit, she complains aching pain about her knees with activity and at night  She also complains of start-up stiffness about her knees  At times, she experiences swelling in her knees  She does use over-the-counter medications sparingly with mild benefit  She [place] acre of ground and enjoys caring for her property  She denies any recent injury or trauma  Review of Systems   Constitutional: Positive for activity change  Negative for chills, fever and unexpected weight change  HENT: Negative for hearing loss, nosebleeds and sore throat  Eyes: Negative for pain, redness and visual disturbance  Respiratory: Negative for cough, shortness of breath and wheezing  Cardiovascular: Negative for chest pain, palpitations and leg swelling  Gastrointestinal: Negative for abdominal pain, nausea and vomiting  Endocrine: Negative for polydipsia and polyuria  Genitourinary: Negative for dysuria and hematuria  Musculoskeletal: Positive for arthralgias and gait problem  Negative for joint swelling and myalgias  See HPI   Skin: Negative for rash and wound  Neurological: Negative for dizziness, numbness and headaches     Psychiatric/Behavioral: Negative for decreased concentration and suicidal ideas  The patient is not nervous/anxious  Past Medical History:   Diagnosis Date    Arthritis     left knee injection-2017    Disease of thyroid gland     GERD (gastroesophageal reflux disease)     Hypertension     Seasonal allergies     Wears glasses        Past Surgical History:   Procedure Laterality Date    BREAST BIOPSY Left 2014    BREAST SURGERY Left     x2 bx-benign     SECTION  1966    x1    COLONOSCOPY      age 74    COLONOSCOPY N/A 3/6/2017    Procedure: COLONOSCOPY;  Surgeon: Gerardo Tello MD;  Location: [facility] GI LAB; Service:     ESOPHAGOGASTRODUODENOSCOPY N/A 3/6/2017    Procedure: ESOPHAGOGASTRODUODENOSCOPY (EGD); Surgeon: Gerardo Tello MD;  Location: [facility] GI LAB; Service:     ESOPHAGOGASTRODUODENOSCOPY N/A 2017    Procedure: ESOPHAGOGASTRODUODENOSCOPY (EGD); Surgeon: Gerardo Tello MD;  Location: [facility] GI LAB; Service: Gastroenterology    HERNIA REPAIR      incisional hernia right side    HYSTERECTOMY  1966    partial- ovaries remain    KNEE ARTHROSCOPY Left     torn meniscus    LIPOMA RESECTION      lower right abdomen    [place] EGD TRANSORAL BIOPSY SINGLE/MULTIPLE N/A 2018    Procedure: ESOPHAGOGASTRODUODENOSCOPY (EGD); Surgeon: Gerardo Tello MD;  Location: [facility] GI LAB;   Service: Gastroenterology    ROTATOR CUFF REPAIR Left     SHOULDER ARTHROSCOPY W/ ROTATOR CUFF REPAIR Left     TRIGGER FINGER RELEASE Right     thumb       Family History   Problem Relation Age of Onset    Hepatitis Brother         hep C from [facility]       Social History     Occupational History    Not on file   Tobacco Use    Smoking status: Never Smoker    Smokeless tobacco: Never Used   Substance and Sexual Activity    Alcohol use: No    Drug use: No    Sexual activity: Not on file         Current Outpatient Medications:     Cholecalciferol (VITAMIN D PO), Take 5,000 Units by mouth once a week On   , Disp: , Rfl:    levothyroxine 25 mcg tablet, Take 25 mcg by mouth every morning, Disp: , Rfl:     metoprolol tartrate (LOPRESSOR) 25 mg tablet, Take 25 mg by mouth 2 (two) times a day, Disp: , Rfl:     omeprazole (PriLOSEC) 20 mg delayed release capsule, One tablet by mouth 1/2 hour before breakfast daily, Disp: 90 capsule, Rfl: 1    No Known Allergies    Objective:  Vitals:    09/15/22 0701   BP: 140/80   Pulse: 80       Body mass index is 34 77 kg/m²  Right Knee Exam     Tenderness   The patient is experiencing tenderness in the medial joint line  Range of Motion   Extension: -5   Flexion: 120     Tests   Varus: negative Valgus: negative  Drawer:  Anterior - negative    Posterior - negative  Patellar apprehension: negative    Other   Erythema: absent  Scars: absent  Sensation: normal  Pulse: present  Swelling: none  Effusion: no effusion present    Comments:  Stable at 0, 30 and 90 degrees  Neurovascularly in tact distally  No warmth or erythema  Parapatellar crepitance noted  Patellofemoral grind: positive      Left Knee Exam     Tenderness   The patient is experiencing tenderness in the medial joint line  Range of Motion   Extension: -5   Flexion: 120     Tests   Varus: negative Valgus: negative  Drawer:  Anterior - negative     Posterior - negative  Patellar apprehension: negative    Other   Erythema: absent  Scars: absent  Sensation: normal  Pulse: present  Swelling: none  Effusion: no effusion present    Comments:  Stable at 0, 30 and 90 degrees  Neurovascularly in tact distally  No warmth or erythema  Parapatellar crepitance noted  Patellofemoral grind: positive          Observations   Left Knee   Negative for effusion  Right Knee   Negative for effusion  Physical Exam  Vitals and nursing note reviewed  Constitutional:       Appearance: She is well-developed  HENT:      Head: Normocephalic and atraumatic  Eyes:      General: No scleral icterus       Extraocular Movements: Extraocular movements intact  Conjunctiva/sclera: Conjunctivae normal    Cardiovascular:      Rate and Rhythm: Normal rate  Pulmonary:      Effort: Pulmonary effort is normal  No respiratory distress  Musculoskeletal:      Cervical back: Normal range of motion and neck supple  Right knee: No effusion  Left knee: No effusion  Comments: As noted in HPI   Skin:     General: Skin is warm and dry  Neurological:      Mental Status: She is alert and oriented to person, place, and time  Psychiatric:         Behavior: Behavior normal          I have personally reviewed pertinent films in PACS  Bilateral knee x-rays obtained on 09/15/2022 reviewed demonstrating end-stage degenerative change in a varus pattern with bone-on-bone contact medially  There is tricompartmental sclerosis and osteophytosis  There is no acute fracture, dislocation, lytic or blastic lesion

## 2022-09-19 DIAGNOSIS — I10 PRIMARY HYPERTENSION: Primary | ICD-10-CM

## 2022-09-19 DIAGNOSIS — E03.9 ACQUIRED HYPOTHYROIDISM: ICD-10-CM

## 2022-09-19 RX ORDER — LEVOTHYROXINE SODIUM 0.03 MG/1
25 TABLET ORAL EVERY MORNING
Qty: 90 TABLET | Refills: 0 | Status: SHIPPED | OUTPATIENT
Start: 2022-09-19

## 2022-11-10 ENCOUNTER — RA CDI HCC (OUTPATIENT)
Dept: OTHER | Facility: HOSPITAL | Age: 77
End: 2022-11-10

## 2022-11-10 NOTE — PROGRESS NOTES
[address]  coding opportunities       Chart reviewed, no opportunity found: CHART REVIEWED, NO OPPORTUNITY FOUND        Patients Insurance     Medicare Insurance: Medicare

## 2022-11-16 ENCOUNTER — OFFICE VISIT (OUTPATIENT)
Dept: FAMILY MEDICINE CLINIC | Facility: CLINIC | Age: 77
End: 2022-11-16

## 2022-11-16 ENCOUNTER — OFFICE VISIT (OUTPATIENT)
Dept: LAB | Facility: HOSPITAL | Age: 77
End: 2022-11-16

## 2022-11-16 VITALS
DIASTOLIC BLOOD PRESSURE: 80 MMHG | HEART RATE: 71 BPM | OXYGEN SATURATION: 97 % | SYSTOLIC BLOOD PRESSURE: 132 MMHG | HEIGHT: 61 IN | WEIGHT: 182 LBS | BODY MASS INDEX: 34.36 KG/M2

## 2022-11-16 DIAGNOSIS — E03.9 ACQUIRED HYPOTHYROIDISM: ICD-10-CM

## 2022-11-16 DIAGNOSIS — Z01.818 PREOP TESTING: ICD-10-CM

## 2022-11-16 DIAGNOSIS — M15.9 PRIMARY OSTEOARTHRITIS INVOLVING MULTIPLE JOINTS: Primary | ICD-10-CM

## 2022-11-16 DIAGNOSIS — K21.9 GASTROESOPHAGEAL REFLUX DISEASE WITHOUT ESOPHAGITIS: ICD-10-CM

## 2022-11-16 DIAGNOSIS — I10 PRIMARY HYPERTENSION: ICD-10-CM

## 2022-11-16 LAB
ATRIAL RATE: 62 BPM
P AXIS: 56 DEGREES
PR INTERVAL: 168 MS
QRS AXIS: 12 DEGREES
QRSD INTERVAL: 80 MS
QT INTERVAL: 414 MS
QTC INTERVAL: 420 MS
T WAVE AXIS: 36 DEGREES
VENTRICULAR RATE: 62 BPM

## 2022-11-16 NOTE — ASSESSMENT & PLAN NOTE
Patient with a history of Moncada's esophagus currently taking omeprazole 20 mg daily will recommend to continue the same

## 2022-11-16 NOTE — PROGRESS NOTES
Office Visit Note  22     Alon Crespo 78 y o  female MRN: [id number]  : 1945    Assessment:     1  Primary osteoarthritis involving multiple joints    2  Gastroesophageal reflux disease without esophagitis  Assessment & Plan:  Patient with a history of Moncada's esophagus currently taking omeprazole 20 mg daily will recommend to continue the same  3  Acquired hypothyroidism  Assessment & Plan:  Patient with hypothyroidism on levothyroxine 25 mcg daily      4  Primary hypertension  Assessment & Plan:  Blood pressure is stable 132/80 will continue with current dose of medication of metoprolol 25 mg 2 times a day    Orders:  -     metoprolol tartrate (LOPRESSOR) 25 mg tablet; Take 2 tablets (50 mg total) by mouth 2 (two) times a day  -     Basic metabolic panel; Future; Expected date: 2022    5  Preop testing  -     ECG 12 lead; Future        Discussion Summary and Plan: Today's care plan and medications were reviewed with patient in detail and all their questions answered to their satisfaction  Chief Complaint   Patient presents with   • Follow-up     [facility] eye surgery      Subjective:  Patient has come in for a follow-up evaluation with regards to her cataracts both eyes but she is going for surgery for the left eye for 1st   Patient with a history of hypertension hypothyroidism and GERD  All medications reviewed paperwork from the ophthalmologist reviewed filled up in detail  No chest pain palpitation shortness of breath  The following portions of the patient's history were reviewed and updated as appropriate: allergies, current medications, past family history, past medical history, past social history, past surgical history and problem list     Review of Systems   Constitutional: Negative for chills and fever  HENT: Negative for ear pain and sore throat  Eyes: Negative for pain and visual disturbance  Respiratory: Negative for cough and shortness of breath  Cardiovascular: Negative for chest pain and palpitations  Gastrointestinal: Negative for abdominal pain and vomiting  Genitourinary: Negative for dysuria and hematuria  Musculoskeletal: Negative for arthralgias and back pain  Skin: Negative for color change and rash  Neurological: Negative for seizures and syncope  All other systems reviewed and are negative  Historical Information   Patient Active Problem List   Diagnosis   • PONV (postoperative nausea and vomiting)   • HTN (hypertension)   • Hypothyroidism   • Gastroesophageal reflux disease   • Osteoarthritis of multiple joints     Past Medical History:   Diagnosis Date   • Arthritis     left knee injection-   • Disease of thyroid gland     hypothyroidism   • DJD (degenerative joint disease)    • GERD (gastroesophageal reflux disease)    • Hypertension    • Seasonal allergies    • Wears glasses      Past Surgical History:   Procedure Laterality Date   • BREAST BIOPSY Left 2014   • BREAST SURGERY Left     x2 bx-benign   •  SECTION  1966    x1   • COLONOSCOPY      age 74   • COLONOSCOPY N/A 3/6/2017    Procedure: COLONOSCOPY;  Surgeon: Cecelia Mason MD;  Location: [place] [address] GI LAB; Service:    • ESOPHAGOGASTRODUODENOSCOPY N/A 3/6/2017    Procedure: ESOPHAGOGASTRODUODENOSCOPY (EGD); Surgeon: Cecelia Mason MD;  Location: [facility] GI LAB; Service:    • ESOPHAGOGASTRODUODENOSCOPY N/A 2017    Procedure: ESOPHAGOGASTRODUODENOSCOPY (EGD); Surgeon: Cecelia Mason MD;  Location: [facility] GI LAB; Service: Gastroenterology   • HERNIA REPAIR      incisional hernia right side   • HYSTERECTOMY      partial- ovaries remain   • KNEE ARTHROSCOPY Left     torn meniscus   • LIPOMA RESECTION      lower right abdomen   • [place] EGD TRANSORAL BIOPSY SINGLE/MULTIPLE N/A 2018    Procedure: ESOPHAGOGASTRODUODENOSCOPY (EGD); Surgeon: Cecelia Mason MD;  Location: [facility] GI LAB;   Service: Gastroenterology   • ROTATOR CUFF REPAIR Left    • SHOULDER ARTHROSCOPY W/ ROTATOR CUFF REPAIR Left    • TRIGGER FINGER RELEASE Right     thumb     Social History     Substance and Sexual Activity   Alcohol Use No     Social History     Substance and Sexual Activity   Drug Use No     Social History     Tobacco Use   Smoking Status Never   Smokeless Tobacco Never     Family History   Problem Relation Age of Onset   • Hepatitis Brother         hep C from [address] [place] [address] Maintenance Due   Topic   • Hepatitis C Screening    • Medicare Annual Wellness Visit (AWV)    • Hepatitis B Vaccine (1 of 3 - 3-dose series)   • COVID-19 Vaccine (1)   • Pneumococcal Vaccine: 65+ Years (1 - PCV)   • Depression Screening    • BMI: Followup Plan    • Fall Risk    • Urinary Incontinence Screening    • Influenza Vaccine (1)      Meds/Allergies       Current Outpatient Medications:   •  Cholecalciferol (VITAMIN D PO), Take 5,000 Units by mouth once a week On Sunday  , Disp: , Rfl:   •  levothyroxine 25 mcg tablet, Take 1 tablet (25 mcg total) by mouth every morning, Disp: 90 tablet, Rfl: 0  •  metoprolol tartrate (LOPRESSOR) 25 mg tablet, Take 2 tablets (50 mg total) by mouth 2 (two) times a day, Disp: 90 tablet, Rfl: 0  •  omeprazole (PriLOSEC) 20 mg delayed release capsule, One tablet by mouth 1/2 hour before breakfast daily, Disp: 90 capsule, Rfl: 1      Objective:    Vitals:   /80 (BP Location: Right arm, Patient Position: Sitting, Cuff Size: Standard)   Pulse 71   Ht 5' 1" (1 549 m)   Wt 82 6 kg (182 lb)   SpO2 97%   BMI 34 39 kg/m²   Body mass index is 34 39 kg/m²  Vitals:    11/16/22 0821   Weight: 82 6 kg (182 lb)       Physical Exam  Vitals and nursing note reviewed  Constitutional:       Appearance: Normal appearance  Cardiovascular:      Rate and Rhythm: Normal rate and regular rhythm  Heart sounds: Normal heart sounds  Pulmonary:      Effort: Pulmonary effort is normal       Breath sounds: Normal breath sounds     Musculoskeletal:      Cervical back: Normal range of motion and neck supple  Right lower leg: No edema  Left lower leg: No edema  Neurological:      Mental Status: She is alert  Lab Review   No visits with results within 2 Month(s) from this visit  Latest known visit with results is:   Hospital Outpatient Visit on 07/25/2022   Component Date Value Ref Range Status   • Case Report 07/25/2022    Final                    Value:Surgical Pathology Report                         Case: [id number]                                   Authorizing Provider:  Nu Recinos MD    Collected:           07/25/2022 0827              Ordering Location:     [facility]   Received:            07/25/2022 [address]                                                                       Pathologist:           Pat Amaya MD                                                         Specimens:   A) - Stomach,  bx polyp                                                                             B) - Esophagogastric junction, bx, hx of barretts                                                   C) - Esophagus, distal esophagus bx                                                       • Final Diagnosis 07/25/2022    Final                    Value: This result contains rich text formatting which cannot be displayed here  • Additional Information 07/25/2022    Final                    Value: This result contains rich text formatting which cannot be displayed here  • Gross Description 07/25/2022    Final                    Value: This result contains rich text formatting which cannot be displayed here  Radha Blake MD        "This note has been constructed using a voice recognition system  Therefore there may be syntax, spelling, and/or grammatical errors   Please call if you have any questions  "

## 2022-11-16 NOTE — ASSESSMENT & PLAN NOTE
Blood pressure is stable 132/80 will continue with current dose of medication of metoprolol 25 mg 2 times a day

## 2023-02-27 DIAGNOSIS — E03.9 ACQUIRED HYPOTHYROIDISM: ICD-10-CM

## 2023-02-27 DIAGNOSIS — I10 PRIMARY HYPERTENSION: ICD-10-CM

## 2023-02-27 RX ORDER — LEVOTHYROXINE SODIUM 0.03 MG/1
25 TABLET ORAL EVERY MORNING
Qty: 90 TABLET | Refills: 0 | Status: SHIPPED | OUTPATIENT
Start: 2023-02-27

## 2023-02-28 DIAGNOSIS — I10 PRIMARY HYPERTENSION: Primary | ICD-10-CM

## 2023-02-28 RX ORDER — METOPROLOL TARTRATE 50 MG/1
TABLET, FILM COATED ORAL
Qty: 90 TABLET | Refills: 0 | Status: SHIPPED | OUTPATIENT
Start: 2023-02-28

## 2023-03-27 DIAGNOSIS — M81.0 AGE-RELATED OSTEOPOROSIS WITHOUT CURRENT PATHOLOGICAL FRACTURE: ICD-10-CM

## 2023-03-27 DIAGNOSIS — Z12.31 SCREENING MAMMOGRAM FOR BREAST CANCER: Primary | ICD-10-CM

## 2023-05-16 ENCOUNTER — OFFICE VISIT (OUTPATIENT)
Dept: FAMILY MEDICINE CLINIC | Facility: CLINIC | Age: 78
End: 2023-05-16

## 2023-05-16 VITALS
HEIGHT: 61 IN | BODY MASS INDEX: 34.93 KG/M2 | SYSTOLIC BLOOD PRESSURE: 146 MMHG | OXYGEN SATURATION: 96 % | DIASTOLIC BLOOD PRESSURE: 80 MMHG | HEART RATE: 70 BPM | WEIGHT: 185 LBS

## 2023-05-16 DIAGNOSIS — I10 PRIMARY HYPERTENSION: Primary | ICD-10-CM

## 2023-05-16 DIAGNOSIS — N18.31 STAGE 3A CHRONIC KIDNEY DISEASE (HCC): ICD-10-CM

## 2023-05-16 DIAGNOSIS — E78.5 DYSLIPIDEMIA: ICD-10-CM

## 2023-05-16 DIAGNOSIS — E03.9 ACQUIRED HYPOTHYROIDISM: ICD-10-CM

## 2023-05-16 DIAGNOSIS — S99.921A INJURY OF RIGHT FOOT, INITIAL ENCOUNTER: ICD-10-CM

## 2023-05-16 DIAGNOSIS — M15.9 PRIMARY OSTEOARTHRITIS INVOLVING MULTIPLE JOINTS: ICD-10-CM

## 2023-05-16 DIAGNOSIS — M81.0 AGE-RELATED OSTEOPOROSIS WITHOUT CURRENT PATHOLOGICAL FRACTURE: ICD-10-CM

## 2023-05-16 DIAGNOSIS — R73.03 PRE-DIABETES: ICD-10-CM

## 2023-05-16 DIAGNOSIS — Z13.0 SCREENING FOR DEFICIENCY ANEMIA: ICD-10-CM

## 2023-05-16 DIAGNOSIS — K21.9 GASTROESOPHAGEAL REFLUX DISEASE WITHOUT ESOPHAGITIS: ICD-10-CM

## 2023-05-16 DIAGNOSIS — E66.09 CLASS 1 OBESITY DUE TO EXCESS CALORIES WITHOUT SERIOUS COMORBIDITY WITH BODY MASS INDEX (BMI) OF 34.0 TO 34.9 IN ADULT: ICD-10-CM

## 2023-05-16 PROBLEM — R11.2 PONV (POSTOPERATIVE NAUSEA AND VOMITING): Status: RESOLVED | Noted: 2022-04-13 | Resolved: 2023-05-16

## 2023-05-16 PROBLEM — E66.811 CLASS 1 OBESITY DUE TO EXCESS CALORIES WITHOUT SERIOUS COMORBIDITY IN ADULT: Status: ACTIVE | Noted: 2023-05-16

## 2023-05-16 PROBLEM — Z98.890 PONV (POSTOPERATIVE NAUSEA AND VOMITING): Status: RESOLVED | Noted: 2022-04-13 | Resolved: 2023-05-16

## 2023-05-16 RX ORDER — MULTIVITAMIN
1 TABLET ORAL DAILY
COMMUNITY

## 2023-05-16 NOTE — PROGRESS NOTES
Office Visit Note  23     Nelson Russell 70 y o  female MRN: [id number]  : 1945    Assessment:     1  Primary hypertension  Assessment & Plan:  Patient blood pressure is always high in the doctor's office today it is 146/80 however at home her pressures have been running decent we will continue with metoprolol 25 mg twice a day keep monitoring the blood pressures at home  2  Stage 3a chronic kidney disease [facility])  Assessment & Plan:  Lab Results   Component Value Date    EGFR 49 07/15/2022    EGFR 52 2022    EGFR 58 2021    CREATININE 1 10 07/15/2022    CREATININE 1 05 2022    CREATININE 0 96 2021   GFR is 49 we will follow-up with repeat BMP      3  Gastroesophageal reflux disease without esophagitis  Assessment & Plan:  Patient with Moncada's esophagus history of being followed by gastroenterologist on omeprazole 20 mg daily we will continue with the same and follow-up with the GI      4  Acquired hypothyroidism  Assessment & Plan:  Continue levothyroxine 25 mcg daily follow-up with the lab work for the thyroid  5  Primary osteoarthritis involving multiple joints  Assessment & Plan:  Patient with osteoarthritis especially she is having discomfort in both the knees is symptomatic treatment for now  BMI Counseling: Body mass index is 34 96 kg/m²  The BMI is above normal  Nutrition recommendations include decreasing portion sizes, decreasing fast food intake, consuming healthier snacks, moderation in carbohydrate intake and reducing intake of cholesterol  Exercise recommendations include moderate physical activity 150 minutes/week  No pharmacotherapy was ordered  Rationale for BMI follow-up plan is due to patient being overweight or obese  Discussion Summary and Plan: Today's care plan and medications were reviewed with patient in detail and all their questions answered to their satisfaction      Chief Complaint   Patient presents with   • Follow-up Subjective:  Patient is coming here for a follow-up evaluation with a history of hypertension, hypothyroidism, patient is concerned about the weight  She has been eating only 1 meal a day her symptoms with GERD has improved significantly when she is having 1 meal a day but she is not losing any weight  Medications reviewed labs reviewed patient had a upper endoscopy done last year  Patient accidentally injured her right foot with a puncture wound with a wooden stick  Exam of the right foot has not shown any evidence of infection will monitor      The following portions of the patient's history were reviewed and updated as appropriate: allergies, current medications, past family history, past medical history, past social history, past surgical history and problem list     Review of Systems   Constitutional: Negative for chills and fever  HENT: Negative for ear pain and sore throat  Eyes: Negative for pain and visual disturbance  Respiratory: Negative for cough and shortness of breath  Cardiovascular: Negative for chest pain and palpitations  Gastrointestinal: Negative for abdominal pain and vomiting  Genitourinary: Negative for dysuria and hematuria  Musculoskeletal: Positive for arthralgias  Negative for back pain  Skin: Negative for color change and rash  Neurological: Negative for seizures and syncope  All other systems reviewed and are negative          Historical Information   Patient Active Problem List   Diagnosis   • HTN (hypertension)   • Hypothyroidism   • Gastroesophageal reflux disease   • Osteoarthritis of multiple joints   • Stage 3a chronic kidney disease ([facility] [address] )     Past Medical History:   Diagnosis Date   • Arthritis     left knee injection-2017   • Disease of thyroid gland     hypothyroidism   • DJD (degenerative joint disease)    • GERD (gastroesophageal reflux disease)    • Hypertension    • Seasonal allergies    • Wears glasses      Past Surgical History:   Procedure Laterality Date   • BREAST BIOPSY Left    • BREAST SURGERY Left     x2 bx-benign   •  SECTION  1966    x1   • COLONOSCOPY      age 74   • COLONOSCOPY N/A 3/6/2017    Procedure: COLONOSCOPY;  Surgeon: Duran Green MD;  Location: [place] [address] GI LAB; Service:    • ESOPHAGOGASTRODUODENOSCOPY N/A 3/6/2017    Procedure: ESOPHAGOGASTRODUODENOSCOPY (EGD); Surgeon: Duran Green MD;  Location: [facility] GI LAB; Service:    • ESOPHAGOGASTRODUODENOSCOPY N/A 2017    Procedure: ESOPHAGOGASTRODUODENOSCOPY (EGD); Surgeon: Duran Green MD;  Location: [facility] GI LAB; Service: Gastroenterology   • HERNIA REPAIR      incisional hernia right side   • HYSTERECTOMY      partial- ovaries remain   • KNEE ARTHROSCOPY Left     torn meniscus   • LIPOMA RESECTION      lower right abdomen   • VT EGD TRANSORAL BIOPSY SINGLE/MULTIPLE N/A 2018    Procedure: ESOPHAGOGASTRODUODENOSCOPY (EGD); Surgeon: Duran Green MD;  Location: [facility] GI LAB;   Service: Gastroenterology   • ROTATOR CUFF REPAIR Left    • SHOULDER ARTHROSCOPY W/ ROTATOR CUFF REPAIR Left    • TRIGGER FINGER RELEASE Right     thumb     Social History     Substance and Sexual Activity   Alcohol Use No     Social History     Substance and Sexual Activity   Drug Use No     Social History     Tobacco Use   Smoking Status Never   • Passive exposure: Never   Smokeless Tobacco Never     Family History   Problem Relation Age of Onset   • Hepatitis Brother         hep C from [address] [place] [address] Maintenance Due   Topic   • Hepatitis C Screening    • Medicare Annual Wellness Visit (AWV)    • Pneumococcal Vaccine: 65+ Years (1 - PCV)   • BMI: Followup Plan    • Fall Risk    • Urinary Incontinence Screening    • COVID-19 Vaccine (2 - Moderna series)   • Breast Cancer Screening: Mammogram    • BMI: Adult       Meds/Allergies       Current Outpatient Medications:   •  levothyroxine 25 mcg tablet, Take 1 tablet (25 mcg total) by mouth every morning, Disp: 90 tablet, Rfl: 0  • "metoprolol tartrate (LOPRESSOR) 25 mg tablet, Take 2 tablets (50 mg total) by mouth 2 (two) times a day, Disp: 90 tablet, Rfl: 0  •  Multiple Vitamin (multivitamin) tablet, Take 1 tablet by mouth daily, Disp: , Rfl:   •  omeprazole (PriLOSEC) 20 mg delayed release capsule, One tablet by mouth 1/2 hour before breakfast daily, Disp: 90 capsule, Rfl: 1  •  Cholecalciferol (VITAMIN D PO), Take 5,000 Units by mouth once a week On Sunday   (Patient not taking: Reported on 5/16/2023), Disp: , Rfl:       Objective:    Vitals:   /80 (BP Location: Right arm, Patient Position: Sitting, Cuff Size: Standard)   Pulse 70   Ht 5' 1\" (1 549 m)   Wt 83 9 kg (185 lb)   SpO2 96%   BMI 34 96 kg/m²   Body mass index is 34 96 kg/m²  Vitals:    05/16/23 0859   Weight: 83 9 kg (185 lb)       Physical Exam  Vitals and nursing note reviewed  Constitutional:       Appearance: Normal appearance  Cardiovascular:      Rate and Rhythm: Normal rate and regular rhythm  Heart sounds: Normal heart sounds  Pulmonary:      Effort: Pulmonary effort is normal       Breath sounds: Normal breath sounds  Abdominal:      Palpations: Abdomen is soft  Musculoskeletal:      Cervical back: Normal range of motion and neck supple  Right lower leg: No edema  Left lower leg: No edema  Skin:     General: Skin is warm and dry  Neurological:      Mental Status: She is alert and oriented to person, place, and time  Lab Review   No visits with results within 2 Month(s) from this visit     Latest known visit with results is:   Office Visit on 11/16/2022   Component Date Value Ref Range Status   • Ventricular Rate 11/16/2022 62  BPM Final   • Atrial Rate 11/16/2022 62  BPM Final   • OH Interval 11/16/2022 168  ms Final   • QRSD Interval 11/16/2022 80  ms Final   • QT Interval 11/16/2022 414  ms Final   • QTC Interval 11/16/2022 420  ms Final   • P Axis 11/16/2022 56  degrees Final   • QRS Axis 11/16/2022 12  degrees Final   • " "[place] 11/16/2022 36  degrees Final         Mlaaika Guerrero MD        \"This note has been constructed using a voice recognition system  Therefore there may be syntax, spelling, and/or grammatical errors  Please call if you have any questions   \"  "

## 2023-05-16 NOTE — ASSESSMENT & PLAN NOTE
Patient BMI is 34 96 patient has been trying to lose weight for a long  Of time she is eating only 1 meal a day fasting for almost 23 hours but she is not still losing any weight  There is a family history of obesity  Recommend patient to do some regular exercise also which she is trying to do it plus we will check the thyroid again to make sure is not causing part of the reason for weight gain

## 2023-05-16 NOTE — ASSESSMENT & PLAN NOTE
Lab Results   Component Value Date    EGFR 49 07/15/2022    EGFR 52 03/16/2022    EGFR 58 05/07/2021    CREATININE 1 10 07/15/2022    CREATININE 1 05 03/16/2022    CREATININE 0 96 05/07/2021   GFR is 49 we will follow-up with repeat BMP

## 2023-05-16 NOTE — ASSESSMENT & PLAN NOTE
Patient with Moncada's esophagus history of being followed by gastroenterologist on omeprazole 20 mg daily we will continue with the same and follow-up with the GI

## 2023-05-16 NOTE — ASSESSMENT & PLAN NOTE
Patient accidentally injured the bottom of the right foot with a wooden stick which went inside but she was able to pull it out  Exam shows no evidence of infection no tenderness in the surrounding area on the plantar aspect of the foot  We will monitor local care  None

## 2023-05-16 NOTE — ASSESSMENT & PLAN NOTE
Patient blood pressure is always high in the doctor's office today it is 146/80 however at home her pressures have been running decent we will continue with metoprolol 25 mg twice a day keep monitoring the blood pressures at home

## 2023-05-16 NOTE — ASSESSMENT & PLAN NOTE
Patient with osteoarthritis especially she is having discomfort in both the knees is symptomatic treatment for now

## 2023-06-20 DIAGNOSIS — I10 PRIMARY HYPERTENSION: ICD-10-CM

## 2023-06-20 DIAGNOSIS — E03.9 ACQUIRED HYPOTHYROIDISM: ICD-10-CM

## 2023-06-20 RX ORDER — METOPROLOL TARTRATE 50 MG/1
TABLET, FILM COATED ORAL
Qty: 90 TABLET | Refills: 1 | Status: SHIPPED | OUTPATIENT
Start: 2023-06-20

## 2023-06-20 RX ORDER — LEVOTHYROXINE SODIUM 0.03 MG/1
25 TABLET ORAL EVERY MORNING
Qty: 90 TABLET | Refills: 0 | Status: SHIPPED | OUTPATIENT
Start: 2023-06-20

## 2023-06-29 ENCOUNTER — HOSPITAL ENCOUNTER (OUTPATIENT)
Dept: RADIOLOGY | Facility: HOSPITAL | Age: 78
Discharge: HOME | End: 2023-06-29
Attending: INTERNAL MEDICINE
Payer: MEDICARE

## 2023-06-29 ENCOUNTER — APPOINTMENT (OUTPATIENT)
Dept: LAB | Facility: HOSPITAL | Age: 78
End: 2023-06-29
Attending: INTERNAL MEDICINE
Payer: MEDICARE

## 2023-06-29 DIAGNOSIS — E03.9 ACQUIRED HYPOTHYROIDISM: ICD-10-CM

## 2023-06-29 DIAGNOSIS — I10 PRIMARY HYPERTENSION: ICD-10-CM

## 2023-06-29 DIAGNOSIS — E78.5 DYSLIPIDEMIA: ICD-10-CM

## 2023-06-29 DIAGNOSIS — Z13.0 SCREENING FOR DEFICIENCY ANEMIA: ICD-10-CM

## 2023-06-29 DIAGNOSIS — R73.03 PRE-DIABETES: ICD-10-CM

## 2023-06-29 DIAGNOSIS — M81.0 AGE-RELATED OSTEOPOROSIS WITHOUT CURRENT PATHOLOGICAL FRACTURE: ICD-10-CM

## 2023-06-29 DIAGNOSIS — Z12.31 SCREENING MAMMOGRAM FOR BREAST CANCER: ICD-10-CM

## 2023-06-29 LAB
ALBUMIN SERPL BCP-MCNC: 3.9 G/DL (ref 3.5–5)
ALP SERPL-CCNC: 73 U/L (ref 34–104)
ALT SERPL W P-5'-P-CCNC: 12 U/L (ref 7–52)
ANION GAP SERPL CALCULATED.3IONS-SCNC: 6 MMOL/L
AST SERPL W P-5'-P-CCNC: 16 U/L (ref 13–39)
BACTERIA UR QL AUTO: NORMAL /HPF
BASOPHILS # BLD AUTO: 0.04 THOUSANDS/ÂΜL (ref 0–0.1)
BASOPHILS NFR BLD AUTO: 1 % (ref 0–1)
BILIRUB SERPL-MCNC: 0.44 MG/DL (ref 0.2–1)
BILIRUB UR QL STRIP: NEGATIVE
BUN SERPL-MCNC: 21 MG/DL (ref 5–25)
CALCIUM SERPL-MCNC: 8.6 MG/DL (ref 8.4–10.2)
CHLORIDE SERPL-SCNC: 105 MMOL/L (ref 96–108)
CHOLEST SERPL-MCNC: 182 MG/DL
CLARITY UR: ABNORMAL
CO2 SERPL-SCNC: 28 MMOL/L (ref 21–32)
COLOR UR: YELLOW
CREAT SERPL-MCNC: 0.93 MG/DL (ref 0.6–1.3)
EOSINOPHIL # BLD AUTO: 0.13 THOUSAND/ÂΜL (ref 0–0.61)
EOSINOPHIL NFR BLD AUTO: 3 % (ref 0–6)
ERYTHROCYTE [DISTWIDTH] IN BLOOD BY AUTOMATED COUNT: 12.6 % (ref 11.6–15.1)
EST. AVERAGE GLUCOSE BLD GHB EST-MCNC: 111 MG/DL
GFR SERPL CREATININE-BSD FRML MDRD: 59 ML/MIN/1.73SQ M
GLUCOSE P FAST SERPL-MCNC: 93 MG/DL (ref 65–99)
GLUCOSE UR STRIP-MCNC: NEGATIVE MG/DL
HBA1C MFR BLD: 5.5 %
HCT VFR BLD AUTO: 46.2 % (ref 34.8–46.1)
HDLC SERPL-MCNC: 44 MG/DL
HGB BLD-MCNC: 15.1 G/DL (ref 11.5–15.4)
HGB UR QL STRIP.AUTO: ABNORMAL
IMM GRANULOCYTES # BLD AUTO: 0.01 THOUSAND/UL (ref 0–0.2)
IMM GRANULOCYTES NFR BLD AUTO: 0 % (ref 0–2)
KETONES UR STRIP-MCNC: NEGATIVE MG/DL
LDLC SERPL CALC-MCNC: 119 MG/DL (ref 0–100)
LEUKOCYTE ESTERASE UR QL STRIP: NEGATIVE
LYMPHOCYTES # BLD AUTO: 1.7 THOUSANDS/ÂΜL (ref 0.6–4.47)
LYMPHOCYTES NFR BLD AUTO: 32 % (ref 14–44)
MCH RBC QN AUTO: 31.5 PG (ref 26.8–34.3)
MCHC RBC AUTO-ENTMCNC: 32.7 G/DL (ref 31.4–37.4)
MCV RBC AUTO: 97 FL (ref 82–98)
MONOCYTES # BLD AUTO: 0.57 THOUSAND/ÂΜL (ref 0.17–1.22)
MONOCYTES NFR BLD AUTO: 11 % (ref 4–12)
NEUTROPHILS # BLD AUTO: 2.85 THOUSANDS/ÂΜL (ref 1.85–7.62)
NEUTS SEG NFR BLD AUTO: 53 % (ref 43–75)
NITRITE UR QL STRIP: NEGATIVE
NON-SQ EPI CELLS URNS QL MICRO: NORMAL /HPF
NONHDLC SERPL-MCNC: 138 MG/DL
NRBC BLD AUTO-RTO: 0 /100 WBCS
PH UR STRIP.AUTO: 6 [PH]
PLATELET # BLD AUTO: 274 THOUSANDS/UL (ref 149–390)
PMV BLD AUTO: 9.7 FL (ref 8.9–12.7)
POTASSIUM SERPL-SCNC: 4.1 MMOL/L (ref 3.5–5.3)
PROT SERPL-MCNC: 6.4 G/DL (ref 6.4–8.4)
PROT UR STRIP-MCNC: NEGATIVE MG/DL
RBC # BLD AUTO: 4.79 MILLION/UL (ref 3.81–5.12)
RBC #/AREA URNS AUTO: NORMAL /HPF
SODIUM SERPL-SCNC: 139 MMOL/L (ref 135–147)
SP GR UR STRIP.AUTO: 1.02 (ref 1–1.03)
TRIGL SERPL-MCNC: 93 MG/DL
TSH SERPL DL<=0.05 MIU/L-ACNC: 2.97 UIU/ML (ref 0.45–4.5)
UROBILINOGEN UR QL STRIP.AUTO: 0.2 E.U./DL
WBC # BLD AUTO: 5.3 THOUSAND/UL (ref 4.31–10.16)
WBC #/AREA URNS AUTO: NORMAL /HPF

## 2023-06-29 PROCEDURE — 36415 COLL VENOUS BLD VENIPUNCTURE: CPT

## 2023-06-29 PROCEDURE — 85025 COMPLETE CBC W/AUTO DIFF WBC: CPT

## 2023-06-29 PROCEDURE — 77067 SCR MAMMO BI INCL CAD: CPT

## 2023-06-29 PROCEDURE — 83036 HEMOGLOBIN GLYCOSYLATED A1C: CPT

## 2023-06-29 PROCEDURE — 80061 LIPID PANEL: CPT

## 2023-06-29 PROCEDURE — 84443 ASSAY THYROID STIM HORMONE: CPT

## 2023-06-29 PROCEDURE — 77063 BREAST TOMOSYNTHESIS BI: CPT

## 2023-06-29 PROCEDURE — 81001 URINALYSIS AUTO W/SCOPE: CPT

## 2023-06-29 PROCEDURE — 77080 DXA BONE DENSITY AXIAL: CPT

## 2023-06-29 PROCEDURE — 80053 COMPREHEN METABOLIC PANEL: CPT

## 2023-07-07 ENCOUNTER — OFFICE VISIT (OUTPATIENT)
Dept: FAMILY MEDICINE CLINIC | Facility: CLINIC | Age: 78
End: 2023-07-07
Payer: MEDICARE

## 2023-07-07 VITALS
SYSTOLIC BLOOD PRESSURE: 144 MMHG | DIASTOLIC BLOOD PRESSURE: 82 MMHG | HEART RATE: 73 BPM | BODY MASS INDEX: 34.55 KG/M2 | OXYGEN SATURATION: 98 % | HEIGHT: 61 IN | WEIGHT: 183 LBS

## 2023-07-07 DIAGNOSIS — M15.9 PRIMARY OSTEOARTHRITIS INVOLVING MULTIPLE JOINTS: ICD-10-CM

## 2023-07-07 DIAGNOSIS — Z00.00 MEDICARE ANNUAL WELLNESS VISIT, SUBSEQUENT: Primary | ICD-10-CM

## 2023-07-07 DIAGNOSIS — E03.9 ACQUIRED HYPOTHYROIDISM: ICD-10-CM

## 2023-07-07 DIAGNOSIS — K21.9 GASTROESOPHAGEAL REFLUX DISEASE WITHOUT ESOPHAGITIS: ICD-10-CM

## 2023-07-07 DIAGNOSIS — I10 PRIMARY HYPERTENSION: ICD-10-CM

## 2023-07-07 DIAGNOSIS — N18.31 STAGE 3A CHRONIC KIDNEY DISEASE (HCC): ICD-10-CM

## 2023-07-07 DIAGNOSIS — E66.09 CLASS 1 OBESITY DUE TO EXCESS CALORIES WITHOUT SERIOUS COMORBIDITY WITH BODY MASS INDEX (BMI) OF 34.0 TO 34.9 IN ADULT: ICD-10-CM

## 2023-07-07 PROBLEM — S99.921A INJURY OF RIGHT FOOT: Status: RESOLVED | Noted: 2023-05-16 | Resolved: 2023-07-07

## 2023-07-07 PROCEDURE — G0439 PPPS, SUBSEQ VISIT: HCPCS | Performed by: INTERNAL MEDICINE

## 2023-07-07 NOTE — PATIENT INSTRUCTIONS
Medicare Preventive Visit Patient Instructions  Thank you for completing your Welcome to Medicare Visit or Medicare Annual Wellness Visit today. Your next wellness visit will be due in one year (7/7/2024). The screening/preventive services that you may require over the next 5-10 years are detailed below. Some tests may not apply to you based off risk factors and/or age. Screening tests ordered at today's visit but not completed yet may show as past due. Also, please note that scanned in results may not display below. Preventive Screenings:  Service Recommendations Previous Testing/Comments   Colorectal Cancer Screening  * Colonoscopy    * Fecal Occult Blood Test (FOBT)/Fecal Immunochemical Test (FIT)  * Fecal DNA/Cologuard Test  * Flexible Sigmoidoscopy Age: 43-73 years old   Colonoscopy: every 10 years (may be performed more frequently if at higher risk)  OR  FOBT/FIT: every 1 year  OR  Cologuard: every 3 years  OR  Sigmoidoscopy: every 5 years  Screening may be recommended earlier than age 39 if at higher risk for colorectal cancer. Also, an individualized decision between you and your healthcare provider will decide whether screening between the ages of 77-80 would be appropriate. Colonoscopy: 04/13/2022  FOBT/FIT: Not on file  Cologuard: Not on file  Sigmoidoscopy: Not on file          Breast Cancer Screening Age: 36 years old  Frequency: every 1-2 years  Not required if history of left and right mastectomy Mammogram: 06/29/2023        Cervical Cancer Screening Between the ages of 21-29, pap smear recommended once every 3 years. Between the ages of 32-69, can perform pap smear with HPV co-testing every 5 years.    Recommendations may differ for women with a history of total hysterectomy, cervical cancer, or abnormal pap smears in past. Pap Smear: 10/28/2019        Hepatitis C Screening Once for adults born between 1945 and 1965  More frequently in patients at high risk for Hepatitis C Hep C Antibody: Not on file        Diabetes Screening 1-2 times per year if you're at risk for diabetes or have pre-diabetes Fasting glucose: 93 mg/dL (6/29/2023)  A1C: 5.5 % (6/29/2023)      Cholesterol Screening Once every 5 years if you don't have a lipid disorder. May order more often based on risk factors. Lipid panel: 06/29/2023          Other Preventive Screenings Covered by Medicare:  1. Abdominal Aortic Aneurysm (AAA) Screening: covered once if your at risk. You're considered to be at risk if you have a family history of AAA. 2. Lung Cancer Screening: covers low dose CT scan once per year if you meet all of the following conditions: (1) Age 48-67; (2) No signs or symptoms of lung cancer; (3) Current smoker or have quit smoking within the last 15 years; (4) You have a tobacco smoking history of at least 20 pack years (packs per day multiplied by number of years you smoked); (5) You get a written order from a healthcare provider. 3. Glaucoma Screening: covered annually if you're considered high risk: (1) You have diabetes OR (2) Family history of glaucoma OR (3)  aged 48 and older OR (3)  American aged 72 and older  3. Osteoporosis Screening: covered every 2 years if you meet one of the following conditions: (1) You're estrogen deficient and at risk for osteoporosis based off medical history and other findings; (2) Have a vertebral abnormality; (3) On glucocorticoid therapy for more than 3 months; (4) Have primary hyperparathyroidism; (5) On osteoporosis medications and need to assess response to drug therapy. · Last bone density test (DXA Scan): 06/29/2023.  5. HIV Screening: covered annually if you're between the age of 15-65. Also covered annually if you are younger than 13 and older than 72 with risk factors for HIV infection. For pregnant patients, it is covered up to 3 times per pregnancy.     Immunizations:  Immunization Recommendations   Influenza Vaccine Annual influenza vaccination during flu season is recommended for all persons aged >= 6 months who do not have contraindications   Pneumococcal Vaccine   * Pneumococcal conjugate vaccine = PCV13 (Prevnar 13), PCV15 (Vaxneuvance), PCV20 (Prevnar 20)  * Pneumococcal polysaccharide vaccine = PPSV23 (Pneumovax) Adults 20-63 years old: 1-3 doses may be recommended based on certain risk factors  Adults 72 years old: 1-2 doses may be recommended based off what pneumonia vaccine you previously received   Hepatitis B Vaccine 3 dose series if at intermediate or high risk (ex: diabetes, end stage renal disease, liver disease)   Tetanus (Td) Vaccine - COST NOT COVERED BY MEDICARE PART B Following completion of primary series, a booster dose should be given every 10 years to maintain immunity against tetanus. Td may also be given as tetanus wound prophylaxis. Tdap Vaccine - COST NOT COVERED BY MEDICARE PART B Recommended at least once for all adults. For pregnant patients, recommended with each pregnancy. Shingles Vaccine (Shingrix) - COST NOT COVERED BY MEDICARE PART B  2 shot series recommended in those aged 48 and above     Health Maintenance Due:      Topic Date Due   • Hepatitis C Screening  Never done   • Breast Cancer Screening: Mammogram  06/29/2024   • Cervical Cancer Screening  10/28/2024   • Colorectal Cancer Screening  Discontinued     Immunizations Due:      Topic Date Due   • Pneumococcal Vaccine: 65+ Years (1 - PCV) Never done   • COVID-19 Vaccine (2 - Moderna series) 01/03/2022   • Influenza Vaccine (1) 09/01/2023     Advance Directives   What are advance directives? Advance directives are legal documents that state your wishes and plans for medical care. These plans are made ahead of time in case you lose your ability to make decisions for yourself. Advance directives can apply to any medical decision, such as the treatments you want, and if you want to donate organs. What are the types of advance directives?   There are many types of advance directives, and each state has rules about how to use them. You may choose a combination of any of the following:  · Living will: This is a written record of the treatment you want. You can also choose which treatments you do not want, which to limit, and which to stop at a certain time. This includes surgery, medicine, IV fluid, and tube feedings. · Durable power of  for healthcare [facility]): This is a written record that states who you want to make healthcare choices for you when you are unable to make them for yourself. This person, called a proxy, is usually a family member or a friend. You may choose more than 1 proxy. · Do not resuscitate (DNR) order:  A DNR order is used in case your heart stops beating or you stop breathing. It is a request not to have certain forms of treatment, such as CPR. A DNR order may be included in other types of advance directives. · Medical directive: This covers the care that you want if you are in a coma, near death, or unable to make decisions for yourself. You can list the treatments you want for each condition. Treatment may include pain medicine, surgery, blood transfusions, dialysis, IV or tube feedings, and a ventilator (breathing machine). · Values history: This document has questions about your views, beliefs, and how you feel and think about life. This information can help others choose the care that you would choose. Why are advance directives important? An advance directive helps you control your care. Although spoken wishes may be used, it is better to have your wishes written down. Spoken wishes can be misunderstood, or not followed. Treatments may be given even if you do not want them. An advance directive may make it easier for your family to make difficult choices about your care.    Weight Management   Why it is important to manage your weight:  Being overweight increases your risk of health conditions such as heart disease, high blood pressure, type 2 diabetes, and certain types of cancer. It can also increase your risk for osteoarthritis, sleep apnea, and other respiratory problems. Aim for a slow, steady weight loss. Even a small amount of weight loss can lower your risk of health problems. How to lose weight safely:  A safe and healthy way to lose weight is to eat fewer calories and get regular exercise. You can lose up about 1 pound a week by decreasing the number of calories you eat by 500 calories each day. Healthy meal plan for weight management:  A healthy meal plan includes a variety of foods, contains fewer calories, and helps you stay healthy. A healthy meal plan includes the following:  · Eat whole-grain foods more often. A healthy meal plan should contain fiber. Fiber is the part of grains, fruits, and vegetables that is not broken down by your body. Whole-grain foods are healthy and provide extra fiber in your diet. Some examples of whole-grain foods are whole-wheat breads and pastas, oatmeal, brown rice, and bulgur. · Eat a variety of vegetables every day. Include dark, leafy greens such as spinach, kale, stephanie greens, and mustard greens. Eat yellow and orange vegetables such as carrots, sweet potatoes, and winter squash. · Eat a variety of fruits every day. Choose fresh or canned fruit (canned in its own juice or light syrup) instead of juice. Fruit juice has very little or no fiber. · Eat low-fat dairy foods. Drink fat-free (skim) milk or 1% milk. Eat fat-free yogurt and low-fat cottage cheese. Try low-fat cheeses such as mozzarella and other reduced-fat cheeses. · Choose meat and other protein foods that are low in fat. Choose beans or other legumes such as split peas or lentils. Choose fish, skinless poultry (chicken or turkey), or lean cuts of red meat (beef or pork). Before you cook meat or poultry, cut off any visible fat. · Use less fat and oil. Try baking foods instead of frying them.  Add less fat, such as margarine, sour cream, regular salad dressing and mayonnaise to foods. Eat fewer high-fat foods. Some examples of high-fat foods include french fries, doughnuts, ice cream, and cakes. · Eat fewer sweets. Limit foods and drinks that are high in sugar. This includes candy, cookies, regular soda, and sweetened drinks. Exercise:  Exercise at least 30 minutes per day on most days of the week. Some examples of exercise include walking, biking, dancing, and swimming. You can also fit in more physical activity by taking the stairs instead of the elevator or parking farther away from stores. Ask your healthcare provider about the best exercise plan for you. © Copyright [employer] 2018 Information is for End User's use only and may not be sold, redistributed or otherwise used for commercial purposes.  All illustrations and images included in CareNotes® are the copyrighted property of [employer] or [address] [place] [address]

## 2023-07-07 NOTE — PROGRESS NOTES
Assessment and Plan:     Problem List Items Addressed This Visit        Digestive    Gastroesophageal reflux disease       Endocrine    Hypothyroidism       Cardiovascular and Mediastinum    HTN (hypertension)       Musculoskeletal and Integument    Osteoarthritis of multiple joints       Genitourinary    Stage 3a chronic kidney disease ([address])       Other    Class 1 obesity due to excess calories without serious comorbidity in adult   Other Visit Diagnoses     Medicare annual wellness visit, subsequent    -  Primary          Depression Screening and Follow-up Plan: Patient was screened for depression during today's encounter. They screened negative with a PHQ-2 score of 0. Urinary Incontinence Plan of Care: counseling topics discussed: practice Kegel (pelvic floor strengthening) exercises, use restroom every 2 hours and keeping a bladder diary. Patient gets incontinence of urine occasionally. She attributes it to her severe bilateral knee pain secondary to osteoarthritis and she takes some time to get to the bathroom and that is when she gets mild leakage of urine otherwise she has no problem. .       Preventive health issues were discussed with patient, and age appropriate screening tests were ordered as noted in patient's After Visit Summary. Personalized health advice and appropriate referrals for health education or preventive services given if needed, as noted in patient's After Visit Summary. History of Present Illness:     Patient presents for a Medicare Wellness Visit    Patient is coming here for a follow-up evaluation with regards to her symptoms of bilateral knee pain which has been bothering her a lot. Patient with severe osteoarthritis of both knees was seen in the past by the orthopedic surgeon and had received injections also without much benefit. He has recommended knee replacements however patient is very concerned about the surgery she did not want to get it done at this time.   She is doing exercise on a stationary bike but not able to do long.'s at the most 5 to 7 minutes time before she start getting pain discomfort. Patient had lab work done which came back unremarkable DEXA scan showed osteopenia mammogram came back normal.  Patient with acid reflux symptoms not able to go off of the PPI. She is being followed by the gastroenterologist with periodic endoscopies. Patient is very much concerned about the weight she has tried liquid diet for a month and lost only 3 pounds. But since she is not able to do much exercise that is adding to the problem for not able to lose weight. Patient Care Team:  Hipolito Mistry MD as PCP - General (Internal Medicine)  Festus Nicolas MD as Endoscopist     Review of Systems:     Review of Systems   Constitutional: Negative for chills, fatigue and fever. HENT: Negative for ear pain, sore throat and trouble swallowing. Eyes: Negative for pain and visual disturbance. Respiratory: Negative for cough and shortness of breath. Cardiovascular: Negative for chest pain and palpitations. Gastrointestinal: Negative for abdominal pain, constipation, diarrhea and vomiting. Genitourinary: Negative for difficulty urinating, dysuria and hematuria. Musculoskeletal: Negative for arthralgias and back pain. Skin: Negative for color change and rash. Neurological: Negative for dizziness, seizures, syncope and headaches. Psychiatric/Behavioral: Negative for agitation, confusion and hallucinations. All other systems reviewed and are negative.        Problem List:     Patient Active Problem List   Diagnosis   • HTN (hypertension)   • Hypothyroidism   • Gastroesophageal reflux disease   • Osteoarthritis of multiple joints   • Stage 3a chronic kidney disease ([address])   • Class 1 obesity due to excess calories without serious comorbidity in adult      Past Medical and Surgical History:     Past Medical History:   Diagnosis Date   • Arthritis     left knee injection-   • Disease of thyroid gland     hypothyroidism   • DJD (degenerative joint disease)    • GERD (gastroesophageal reflux disease)    • Hypertension    • Seasonal allergies    • Wears glasses      Past Surgical History:   Procedure Laterality Date   • BREAST BIOPSY Left 2014   • BREAST SURGERY Left     x2 bx-benign   •  SECTION  1966    x1   • COLONOSCOPY      age 74   • COLONOSCOPY N/A 3/6/2017    Procedure: COLONOSCOPY;  Surgeon: Shilpa Dillard MD;  Location: [address] [place] [address] [address] [facility]; Service:    • ESOPHAGOGASTRODUODENOSCOPY N/A 3/6/2017    Procedure: ESOPHAGOGASTRODUODENOSCOPY (EGD); Surgeon: Shilpa Dillard MD;  Location: [facility]; Service:    • ESOPHAGOGASTRODUODENOSCOPY N/A 2017    Procedure: ESOPHAGOGASTRODUODENOSCOPY (EGD); Surgeon: Shilpa Dillard MD;  Location: [facility] GI [facility]; Service: Gastroenterology   • HERNIA REPAIR      incisional hernia right side   • HYSTERECTOMY      partial- ovaries remain   • KNEE ARTHROSCOPY Left     torn meniscus   • LIPOMA RESECTION      lower right abdomen   • CT EGD TRANSORAL BIOPSY SINGLE/MULTIPLE N/A 2018    Procedure: ESOPHAGOGASTRODUODENOSCOPY (EGD); Surgeon: Shilpa Dillard MD;  Location: [facility] GI [facility];   Service: Gastroenterology   • ROTATOR CUFF REPAIR Left    • SHOULDER ARTHROSCOPY W/ ROTATOR CUFF REPAIR Left    • TRIGGER FINGER RELEASE Right     thumb      Family History:     Family History   Problem Relation Age of Onset   • Hepatitis Brother         hep C from [facility]      Social History:     Social History     Socioeconomic History   • Marital status:      Spouse name: None   • Number of children: None   • Years of education: None   • Highest education level: None   Occupational History   • None   Tobacco Use   • Smoking status: Never     Passive exposure: Never   • Smokeless tobacco: Never   Substance and Sexual Activity   • Alcohol use: No   • Drug use: No   • Sexual activity: None   Other Topics Concern   • None   Social History Narrative   • None     Social Determinants of Health     Financial Resource Strain: Low Risk  (7/7/2023)    Overall Financial Resource Strain (CARDIA)    • Difficulty of Paying Living Expenses: Not hard at all   Food Insecurity: Not on file   Transportation Needs: No Transportation Needs (7/7/2023)    PRAPARE - Transportation    • Lack of Transportation (Medical): No    • Lack of Transportation (Non-Medical): No   Physical Activity: Not on file   Stress: Not on file   Social Connections: Not on file   Intimate Partner Violence: Not on file   Housing Stability: Not on file      Medications and Allergies:     Current Outpatient Medications   Medication Sig Dispense Refill   • Cholecalciferol (VITAMIN D PO) Take 5,000 Units by mouth once a week On Sunday     • levothyroxine 25 mcg tablet Take 1 tablet (25 mcg total) by mouth every morning 90 tablet 0   • metoprolol tartrate (LOPRESSOR) 50 mg tablet Take half a tablet of the 50 mg in the morning and half a tablet of 50 mg in the evening 90 tablet 1   • Multiple Vitamin (multivitamin) tablet Take 1 tablet by mouth daily     • omeprazole (PriLOSEC) 20 mg delayed release capsule One tablet by mouth 1/2 hour before breakfast daily 90 capsule 1     No current facility-administered medications for this visit. No Known Allergies   Immunizations:     Immunization History   Administered Date(s) Administered   • COVID-19 MODERNA VACC 0.5 ML IM 11/08/2021      Health Maintenance:         Topic Date Due   • Hepatitis C Screening  Never done   • Breast Cancer Screening: Mammogram  06/29/2024   • Cervical Cancer Screening  10/28/2024   • Colorectal Cancer Screening  Discontinued         Topic Date Due   • Pneumococcal Vaccine: 65+ Years (1 - PCV) Never done   • COVID-19 Vaccine (2 - Moderna series) 01/03/2022   • Influenza Vaccine (1) 09/01/2023      Medicare Screening Tests and Risk Assessments:     Korina Champagne is here for her Subsequent Wellness visit.  Last Medicare Wellness visit information reviewed, patient interviewed and updates made to the record today. Health Risk Assessment:   Patient rates overall health as very good. Patient feels that their physical health rating is same. Patient is satisfied with their life. Eyesight was rated as same. Hearing was rated as same. Patient feels that their emotional and mental health rating is same. Patients states they are never, rarely angry. Patient states they are never, rarely unusually tired/fatigued. Pain experienced in the last 7 days has been none. Patient states that she has experienced no weight loss or gain in last 6 months. Weight is stable    Depression Screening:   PHQ-2 Score: 0      Fall Risk Screening: In the past year, patient has experienced: no history of falling in past year      Urinary Incontinence Screening:   Patient has leaked urine accidently in the last six months. Some dribbling at times. Patient with severe osteoarthritis of both the knees takes some time for her to go to the bathroom has been she notices slight dribbling otherwise she is not incontinent of urine. Home Safety:  Patient does not have trouble with stairs inside or outside of their home. Patient has working smoke alarms and has working carbon monoxide detector. Home safety hazards include: none. No home safety hazards    Nutrition:   Current diet is Other (please comment). Pt states she has been trying different diets to try to lose weight    Medications:   Patient is currently taking over-the-counter supplements. OTC medications include: see medication list. Patient is able to manage medications. No problems with meds    Activities of Daily Living (ADLs)/Instrumental Activities of Daily Living (IADLs):   Walk and transfer into and out of bed and chair?: Yes  Dress and groom yourself?: Yes    Bathe or shower yourself?: Yes    Feed yourself?  Yes  Do your laundry/housekeeping?: Yes  Manage your money, pay your bills and track your expenses?: Yes  Make your own meals?: Yes    Do your own shopping?: Yes    ADL comments: Pt is independent    Previous Hospitalizations:   Any hospitalizations or ED visits within the last 12 months?: No      Hospitalization Comments: Health conditions are stable    Advance Care Planning:   Living will: No    Durable POA for healthcare: No    Advanced directive: Yes    Advanced directive counseling given: Yes    Five wishes given: No    Patient declined ACP directive: No    End of Life Decisions reviewed with patient: Yes    Provider agrees with end of life decisions: Yes      Comments: Discussed with patient regarding living will papers related to that has been given to the patient for her to review and bring them back regarding her wishes.     Cognitive Screening:   Provider or family/friend/caregiver concerned regarding cognition?: No    PREVENTIVE SCREENINGS      Cardiovascular Screening:    General: History Lipid Disorder and Screening Current      Diabetes Screening:     General: History Diabetes and Screening Current      Colorectal Cancer Screening:     General: Risks and Benefits Discussed and Screening Current      Breast Cancer Screening:     General: Screening Current      Cervical Cancer Screening:    General: Screening Not Indicated      Osteoporosis Screening:    General: History Osteoporosis and Screening Current      Abdominal Aortic Aneurysm (AAA) Screening:        General: Risks and Benefits Discussed and Screening Not Indicated      Lung Cancer Screening:     General: Screening Not Indicated      Hepatitis C Screening:    General: Risks and Benefits Discussed      Preventive Screening Comments: Patient gets regularly her colonoscopies    Screening, Brief Intervention, and Referral to Treatment (SBIRT)    Screening    Typical number of drinks in a week: 0    Single Item Drug Screening:  How often have you used an illegal drug (including marijuana) or a prescription medication for non-medical reasons in the past year? never    Single Item Drug Screen Score: 0  Interpretation: Negative screen for possible drug use disorder    Brief Intervention  Alcohol & drug use screenings were reviewed. No concerns regarding substance use disorder identified. Other Counseling Topics:   Car/seat belt/driving safety and calcium and vitamin D intake. No results found. Physical Exam:     /82   Pulse 73   Ht 5' 1" (1.549 m)   Wt 83 kg (183 lb)   SpO2 98%   BMI 34.58 kg/m²     Physical Exam  Vitals and nursing note reviewed. Constitutional:       General: She is not in acute distress. Appearance: Normal appearance. She is well-developed. She is obese. She is not ill-appearing. HENT:      Head: Normocephalic and atraumatic. Right Ear: External ear normal.      Left Ear: External ear normal.      Nose: Nose normal. No congestion or rhinorrhea. Mouth/Throat:      Pharynx: Oropharynx is clear. No oropharyngeal exudate. Eyes:      Conjunctiva/sclera: Conjunctivae normal.   Cardiovascular:      Rate and Rhythm: Normal rate and regular rhythm. Pulses: Normal pulses. Pulmonary:      Effort: Pulmonary effort is normal. No respiratory distress. Breath sounds: Normal breath sounds. Abdominal:      General: Bowel sounds are normal. There is no distension. Palpations: Abdomen is soft. There is no mass. Tenderness: There is no abdominal tenderness. Hernia: No hernia is present. Musculoskeletal:         General: No swelling. Cervical back: Normal range of motion and neck supple. Skin:     General: Skin is warm and dry. Capillary Refill: Capillary refill takes less than 2 seconds. Coloration: Skin is not jaundiced or pale. Findings: No rash. Neurological:      General: No focal deficit present. Mental Status: She is alert and oriented to person, place, and time. Motor: No weakness.       Coordination: Coordination normal.      Gait: Gait normal. Psychiatric:         Mood and Affect: Mood normal.         Behavior: Behavior normal.         Thought Content:  Thought content normal.         Judgment: Judgment normal.          Sarthak Burleson MD

## 2023-08-23 ENCOUNTER — RA CDI HCC (OUTPATIENT)
Dept: OTHER | Facility: HOSPITAL | Age: 78
End: 2023-08-23

## 2023-08-29 ENCOUNTER — APPOINTMENT (OUTPATIENT)
Dept: LAB | Facility: HOSPITAL | Age: 78
End: 2023-08-29
Payer: MEDICARE

## 2023-08-29 ENCOUNTER — OFFICE VISIT (OUTPATIENT)
Dept: FAMILY MEDICINE CLINIC | Facility: CLINIC | Age: 78
End: 2023-08-29
Payer: MEDICARE

## 2023-08-29 VITALS
HEIGHT: 62 IN | BODY MASS INDEX: 34.08 KG/M2 | HEART RATE: 63 BPM | DIASTOLIC BLOOD PRESSURE: 80 MMHG | SYSTOLIC BLOOD PRESSURE: 136 MMHG | WEIGHT: 185.2 LBS | OXYGEN SATURATION: 99 %

## 2023-08-29 DIAGNOSIS — N18.31 STAGE 3A CHRONIC KIDNEY DISEASE (HCC): ICD-10-CM

## 2023-08-29 DIAGNOSIS — Z01.818 OTHER SPECIFIED PRE-OPERATIVE EXAMINATION: ICD-10-CM

## 2023-08-29 DIAGNOSIS — M85.89 OSTEOPENIA OF MULTIPLE SITES: ICD-10-CM

## 2023-08-29 DIAGNOSIS — E03.9 ACQUIRED HYPOTHYROIDISM: ICD-10-CM

## 2023-08-29 DIAGNOSIS — I10 PRIMARY HYPERTENSION: ICD-10-CM

## 2023-08-29 DIAGNOSIS — E66.09 CLASS 1 OBESITY DUE TO EXCESS CALORIES WITHOUT SERIOUS COMORBIDITY WITH BODY MASS INDEX (BMI) OF 33.0 TO 33.9 IN ADULT: ICD-10-CM

## 2023-08-29 DIAGNOSIS — M15.9 PRIMARY OSTEOARTHRITIS INVOLVING MULTIPLE JOINTS: ICD-10-CM

## 2023-08-29 DIAGNOSIS — Z01.818 PREOPERATIVE CLEARANCE: Primary | ICD-10-CM

## 2023-08-29 DIAGNOSIS — K21.9 GASTROESOPHAGEAL REFLUX DISEASE WITHOUT ESOPHAGITIS: ICD-10-CM

## 2023-08-29 LAB
ALBUMIN SERPL BCP-MCNC: 4 G/DL (ref 3.5–5)
ALP SERPL-CCNC: 58 U/L (ref 34–104)
ALT SERPL W P-5'-P-CCNC: 15 U/L (ref 7–52)
ANION GAP SERPL CALCULATED.3IONS-SCNC: 6 MMOL/L
AST SERPL W P-5'-P-CCNC: 19 U/L (ref 13–39)
BASOPHILS # BLD AUTO: 0.05 THOUSANDS/ÂΜL (ref 0–0.1)
BASOPHILS NFR BLD AUTO: 1 % (ref 0–1)
BILIRUB SERPL-MCNC: 0.55 MG/DL (ref 0.2–1)
BUN SERPL-MCNC: 21 MG/DL (ref 5–25)
CALCIUM SERPL-MCNC: 9.3 MG/DL (ref 8.4–10.2)
CHLORIDE SERPL-SCNC: 106 MMOL/L (ref 96–108)
CO2 SERPL-SCNC: 28 MMOL/L (ref 21–32)
CREAT SERPL-MCNC: 0.97 MG/DL (ref 0.6–1.3)
EOSINOPHIL # BLD AUTO: 0.08 THOUSAND/ÂΜL (ref 0–0.61)
EOSINOPHIL NFR BLD AUTO: 1 % (ref 0–6)
ERYTHROCYTE [DISTWIDTH] IN BLOOD BY AUTOMATED COUNT: 12.6 % (ref 11.6–15.1)
ERYTHROCYTE [SEDIMENTATION RATE] IN BLOOD: 3 MM/HOUR (ref 0–29)
GFR SERPL CREATININE-BSD FRML MDRD: 56 ML/MIN/1.73SQ M
GLUCOSE P FAST SERPL-MCNC: 99 MG/DL (ref 65–99)
HCT VFR BLD AUTO: 46.4 % (ref 34.8–46.1)
HGB BLD-MCNC: 15.3 G/DL (ref 11.5–15.4)
IMM GRANULOCYTES # BLD AUTO: 0.02 THOUSAND/UL (ref 0–0.2)
IMM GRANULOCYTES NFR BLD AUTO: 0 % (ref 0–2)
LYMPHOCYTES # BLD AUTO: 1.54 THOUSANDS/ÂΜL (ref 0.6–4.47)
LYMPHOCYTES NFR BLD AUTO: 27 % (ref 14–44)
MCH RBC QN AUTO: 31.2 PG (ref 26.8–34.3)
MCHC RBC AUTO-ENTMCNC: 33 G/DL (ref 31.4–37.4)
MCV RBC AUTO: 95 FL (ref 82–98)
MONOCYTES # BLD AUTO: 0.48 THOUSAND/ÂΜL (ref 0.17–1.22)
MONOCYTES NFR BLD AUTO: 8 % (ref 4–12)
NEUTROPHILS # BLD AUTO: 3.52 THOUSANDS/ÂΜL (ref 1.85–7.62)
NEUTS SEG NFR BLD AUTO: 63 % (ref 43–75)
NRBC BLD AUTO-RTO: 0 /100 WBCS
PLATELET # BLD AUTO: 258 THOUSANDS/UL (ref 149–390)
PMV BLD AUTO: 9.5 FL (ref 8.9–12.7)
POTASSIUM SERPL-SCNC: 4.2 MMOL/L (ref 3.5–5.3)
PROT SERPL-MCNC: 6.6 G/DL (ref 6.4–8.4)
RBC # BLD AUTO: 4.9 MILLION/UL (ref 3.81–5.12)
SODIUM SERPL-SCNC: 140 MMOL/L (ref 135–147)
WBC # BLD AUTO: 5.69 THOUSAND/UL (ref 4.31–10.16)

## 2023-08-29 PROCEDURE — 99214 OFFICE O/P EST MOD 30 MIN: CPT | Performed by: INTERNAL MEDICINE

## 2023-08-29 PROCEDURE — 85652 RBC SED RATE AUTOMATED: CPT

## 2023-08-29 PROCEDURE — 80053 COMPREHEN METABOLIC PANEL: CPT

## 2023-08-29 PROCEDURE — 36415 COLL VENOUS BLD VENIPUNCTURE: CPT

## 2023-08-29 PROCEDURE — 85025 COMPLETE CBC W/AUTO DIFF WBC: CPT

## 2023-08-29 NOTE — ASSESSMENT & PLAN NOTE
Patient is going for knee surgery on the right side in September I reviewed the lab work from June but she is going to have lab work done today and also EKG will review those and give the clearance for the same.

## 2023-08-29 NOTE — ASSESSMENT & PLAN NOTE
Lab Results   Component Value Date    EGFR 59 06/29/2023    EGFR 49 07/15/2022    EGFR 52 03/16/2022    CREATININE 0.93 06/29/2023    CREATININE 1.10 07/15/2022    CREATININE 1.05 03/16/2022   Patient GFR is now 59 creatinine is 0.93 BUN 21.   We will monitor

## 2023-08-29 NOTE — ASSESSMENT & PLAN NOTE
Patient with hypothyroidism currently taking levothyroxine 25 mcg daily TSH level came back at 2.973 normal we will continue with the same dosage for now.

## 2023-08-29 NOTE — ASSESSMENT & PLAN NOTE
As mentioned in HPI patient with severe osteoarthritis of both the knees more on the right side scheduled to have surgery done in September getting the preop blood work done along with EKG.   Currently patient is having difficulty with moving around not able to do any exercise Awake/No adverse reaction to first time med in ED/Symptoms improved/Dressing clean and dry/Alert and oriented to person, place and time

## 2023-08-29 NOTE — ASSESSMENT & PLAN NOTE
Patient's BMI is 33.87 and has become very difficult to lose weight because she is not able to do any exercise she is trying to monitor her calorie intake. Once her surgery is done she should be able to do more exercise and able to control her weight better.

## 2023-08-29 NOTE — PROGRESS NOTES
Office Visit Note  23     Junie Gardner 70 y.o. female MRN: [id number]  : 1945    Assessment:     1. Preoperative clearance  Assessment & Plan:  Patient is going for knee surgery on the right side in September I reviewed the lab work from  but she is going to have lab work done today and also EKG will review those and give the clearance for the same. 2. Class 1 obesity due to excess calories without serious comorbidity with body mass index (BMI) of 33.0 to 33.9 in adult  Assessment & Plan:  Patient's BMI is 33.87 and has become very difficult to lose weight because she is not able to do any exercise she is trying to monitor her calorie intake. Once her surgery is done she should be able to do more exercise and able to control her weight better. 3. Gastroesophageal reflux disease without esophagitis  Assessment & Plan:  Patient with GERD Moncada's esophagus currently taking omeprazole 20 mg daily she is not able to go off of that medication. On the day when she does not take the medication she could see the difference. Being on omeprazole for long period of time is under concern she has with osteopenia osteoporosis. 4. Primary hypertension  Assessment & Plan:  Patient blood pressure today is 136/80 currently she is taking metoprolol tartrate 25 mg twice a day we will continue the same for now. Recommend cut back on the salt intake      5. Acquired hypothyroidism  Assessment & Plan:  Patient with hypothyroidism currently taking levothyroxine 25 mcg daily TSH level came back at 2.973 normal we will continue with the same dosage for now. 6. Primary osteoarthritis involving multiple joints  Assessment & Plan:  As mentioned in HPI patient with severe osteoarthritis of both the knees more on the right side scheduled to have surgery done in September getting the preop blood work done along with EKG.   Currently patient is having difficulty with moving around not able to do any exercise      7. Stage 3a chronic kidney disease [facility])  Assessment & Plan:  Lab Results   Component Value Date    EGFR 59 06/29/2023    EGFR 49 07/15/2022    EGFR 52 03/16/2022    CREATININE 0.93 06/29/2023    CREATININE 1.10 07/15/2022    CREATININE 1.05 03/16/2022   Patient GFR is now 59 creatinine is 0.93 BUN 21. We will monitor      8. Osteopenia of multiple sites  Assessment & Plan:  Patient is taking calcium and vitamin D supplements. DEXA scan report noted. Discussion Summary and Plan: Today's care plan and medications were reviewed with patient in detail and all their questions answered to their satisfaction. Chief Complaint   Patient presents with   • Follow-up     Having knee surgery on 9/25/23. Erik Valiente        Subjective:  Patient has coming here for evaluation regarding preop for right knee replacement. Patient with severe osteoarthritis of both the knee joints more so on the right side with increasing pain difficulty with ambulation not able to do any exercise and has gone up on the weight currently weighing 185 pounds. Patient was seen by the orthopedics and had received steroid injection with some relief but the symptoms are persisting and she is scheduled to have the surgery done on the right side first on September 25. Patient is going for the blood test and the cardiogram today for me to evaluate and give the clearance for the surgery. Labs done in the month of June shows normal CBC hemoglobin 15.4 chemistries unremarkable liver function test creatinine normal hemoglobin A1c 5.4 I lipid profile shows cholesterol 182 it was 204 before triglycerides 93 HDL 44 LDL is 119      The following portions of the patient's history were reviewed and updated as appropriate: allergies, current medications, past family history, past medical history, past social history, past surgical history and problem list.    Review of Systems   Constitutional: Negative for chills and fever. HENT: Negative for ear pain and sore throat. Eyes: Negative for pain and visual disturbance. Respiratory: Negative for cough and shortness of breath. Cardiovascular: Negative for chest pain and palpitations. Gastrointestinal: Negative for abdominal pain and vomiting. Reflux symptoms   Genitourinary: Negative for dysuria and hematuria. Musculoskeletal: Positive for arthralgias. Negative for back pain. Skin: Negative for color change and rash. Neurological: Negative for seizures and syncope. All other systems reviewed and are negative. Historical Information   Patient Active Problem List   Diagnosis   • HTN (hypertension)   • Hypothyroidism   • Gastroesophageal reflux disease   • Osteoarthritis of multiple joints   • Stage 3a chronic kidney disease ([address])   • Class 1 obesity due to excess calories without serious comorbidity in adult   • Preoperative clearance   • Osteopenia of multiple sites     Past Medical History:   Diagnosis Date   • Arthritis     left knee injection-   • Disease of thyroid gland     hypothyroidism   • DJD (degenerative joint disease)    • GERD (gastroesophageal reflux disease)    • Hypertension    • Seasonal allergies    • Wears glasses      Past Surgical History:   Procedure Laterality Date   • BREAST BIOPSY Left 2014   • BREAST SURGERY Left     x2 bx-benign   •  SECTION  1966    x1   • COLONOSCOPY      age 74   • COLONOSCOPY N/A 3/6/2017    Procedure: COLONOSCOPY;  Surgeon: Anastasia Dodd MD;  Location: [address] [place] [address] GI LAB; Service:    • ESOPHAGOGASTRODUODENOSCOPY N/A 3/6/2017    Procedure: ESOPHAGOGASTRODUODENOSCOPY (EGD); Surgeon: Anastasia Dodd MD;  Location: [facility] GI LAB; Service:    • ESOPHAGOGASTRODUODENOSCOPY N/A 2017    Procedure: ESOPHAGOGASTRODUODENOSCOPY (EGD); Surgeon: Anastasia Dodd MD;  Location: [facility] GI LAB;   Service: Gastroenterology   • HERNIA REPAIR      incisional hernia right side   • HYSTERECTOMY      partial- ovaries remain   • KNEE ARTHROSCOPY Left     torn meniscus   • LIPOMA RESECTION      lower right abdomen   • TN EGD TRANSORAL BIOPSY SINGLE/MULTIPLE N/A 6/18/2018    Procedure: ESOPHAGOGASTRODUODENOSCOPY (EGD); Surgeon: Ira Arndt MD;  Location: [facility] GI LAB; Service: Gastroenterology   • ROTATOR CUFF REPAIR Left    • SHOULDER ARTHROSCOPY W/ ROTATOR CUFF REPAIR Left    • TRIGGER FINGER RELEASE Right     thumb     Social History     Substance and Sexual Activity   Alcohol Use No     Social History     Substance and Sexual Activity   Drug Use No     Social History     Tobacco Use   Smoking Status Never   • Passive exposure: Never   Smokeless Tobacco Never     Family History   Problem Relation Age of Onset   • Hepatitis Brother         hep C from [facility] Due   Topic   • Hepatitis C Screening    • Pneumococcal Vaccine: 65+ Years (1 - PCV)   • COVID-19 Vaccine (2 - Moderna series)   • Influenza Vaccine (1)      Meds/Allergies       Current Outpatient Medications:   •  Cholecalciferol (VITAMIN D PO), Take 5,000 Units by mouth once a week On Sunday, Disp: , Rfl:   •  levothyroxine 25 mcg tablet, Take 1 tablet (25 mcg total) by mouth every morning, Disp: 90 tablet, Rfl: 0  •  metoprolol tartrate (LOPRESSOR) 50 mg tablet, Take half a tablet of the 50 mg in the morning and half a tablet of 50 mg in the evening, Disp: 90 tablet, Rfl: 1  •  Multiple Vitamin (multivitamin) tablet, Take 1 tablet by mouth daily, Disp: , Rfl:   •  omeprazole (PriLOSEC) 20 mg delayed release capsule, One tablet by mouth 1/2 hour before breakfast daily, Disp: 90 capsule, Rfl: 1      Objective:    Vitals:   /80 (BP Location: Right arm, Patient Position: Sitting, Cuff Size: Standard)   Pulse 63   Ht 5' 2" (1.575 m)   Wt 84 kg (185 lb 3.2 oz)   SpO2 99%   BMI 33.87 kg/m²   Body mass index is 33.87 kg/m². Vitals:    08/29/23 1109   Weight: 84 kg (185 lb 3.2 oz)       Physical Exam  Vitals and nursing note reviewed. Constitutional:       Appearance: Normal appearance. Cardiovascular:      Rate and Rhythm: Normal rate and regular rhythm. Heart sounds: Normal heart sounds. Pulmonary:      Effort: Pulmonary effort is normal.      Breath sounds: Normal breath sounds. Musculoskeletal:      Cervical back: Normal range of motion and neck supple. Right lower leg: No edema. Left lower leg: No edema. Comments: Movements of the knee joints causing discomfort and pain   Neurological:      Mental Status: She is alert and oriented to person, place, and time. Lab Review   Appointment on 08/29/2023   Component Date Value Ref Range Status   • Sodium 08/29/2023 140  135 - 147 mmol/L Final   • Potassium 08/29/2023 4.2  3.5 - 5.3 mmol/L Final   • Chloride 08/29/2023 106  96 - 108 mmol/L Final   • CO2 08/29/2023 28  21 - 32 mmol/L Final   • ANION GAP 08/29/2023 6  mmol/L Final   • BUN 08/29/2023 21  5 - 25 mg/dL Final   • Creatinine 08/29/2023 0.97  0.60 - 1.30 mg/dL Final    Standardized to IDMS reference method   • Glucose, Fasting 08/29/2023 99  65 - 99 mg/dL Final   • Calcium 08/29/2023 9.3  8.4 - 10.2 mg/dL Final   • AST 08/29/2023 19  13 - 39 U/L Final   • ALT 08/29/2023 15  7 - 52 U/L Final    Specimen collection should occur prior to Sulfasalazine administration due to the potential for falsely depressed results. • Alkaline Phosphatase 08/29/2023 58  34 - 104 U/L Final   • Total Protein 08/29/2023 6.6  6.4 - 8.4 g/dL Final   • Albumin 08/29/2023 4.0  3.5 - 5.0 g/dL Final   • Total Bilirubin 08/29/2023 0.55  0.20 - 1.00 mg/dL Final    Use of this assay is not recommended for patients undergoing treatment with eltrombopag due to the potential for falsely elevated results. N-acetyl-p-benzoquinone imine (metabolite of Acetaminophen) will generate erroneously low results in samples for patients that have taken an overdose of Acetaminophen.    • eGFR 08/29/2023 56  ml/min/1.73sq m Final   • WBC 08/29/2023 5. 69  4.31 - 10.16 Thousand/uL Final   • RBC 08/29/2023 4.90  3.81 - 5.12 Million/uL Final   • Hemoglobin 08/29/2023 15.3  11.5 - 15.4 g/dL Final   • Hematocrit 08/29/2023 46.4 (H)  34.8 - 46.1 % Final   • MCV 08/29/2023 95  82 - 98 fL Final   • MCH 08/29/2023 31.2  26.8 - 34.3 pg Final   • MCHC 08/29/2023 33.0  31.4 - 37.4 g/dL Final   • RDW 08/29/2023 12.6  11.6 - 15.1 % Final   • MPV 08/29/2023 9.5  8.9 - 12.7 fL Final   • Platelets 26/36/3494 258  149 - 390 Thousands/uL Final   • nRBC 08/29/2023 0  /100 WBCs Final   • Neutrophils Relative 08/29/2023 63  43 - 75 % Final   • Immat GRANS % 08/29/2023 0  0 - 2 % Final   • Lymphocytes Relative 08/29/2023 27  14 - 44 % Final   • Monocytes Relative 08/29/2023 8  4 - 12 % Final   • Eosinophils Relative 08/29/2023 1  0 - 6 % Final   • Basophils Relative 08/29/2023 1  0 - 1 % Final   • Neutrophils Absolute 08/29/2023 3.52  1.85 - 7.62 Thousands/µL Final   • Immature Grans Absolute 08/29/2023 0.02  0.00 - 0.20 Thousand/uL Final   • Lymphocytes Absolute 08/29/2023 1.54  0.60 - 4.47 Thousands/µL Final   • Monocytes Absolute 08/29/2023 0.48  0.17 - 1.22 Thousand/µL Final   • Eosinophils Absolute 08/29/2023 0.08  0.00 - 0.61 Thousand/µL Final   • Basophils Absolute 08/29/2023 0.05  0.00 - 0.10 Thousands/µL Final   • Sed Rate 08/29/2023 3  0 - 29 mm/hour Final         Aviva Delgado MD        "This note has been constructed using a voice recognition system. Therefore there may be syntax, spelling, and/or grammatical errors.  Please call if you have any questions. "

## 2023-08-29 NOTE — ASSESSMENT & PLAN NOTE
Patient with GERD Moncada's esophagus currently taking omeprazole 20 mg daily she is not able to go off of that medication. On the day when she does not take the medication she could see the difference. Being on omeprazole for long period of time is under concern she has with osteopenia osteoporosis.

## 2023-08-29 NOTE — ASSESSMENT & PLAN NOTE
Patient blood pressure today is 136/80 currently she is taking metoprolol tartrate 25 mg twice a day we will continue the same for now.   Recommend cut back on the salt intake

## 2023-09-06 LAB
ATRIAL RATE: 60 BPM
P AXIS: 52 DEGREES
PR INTERVAL: 172 MS
QRS AXIS: 25 DEGREES
QRSD INTERVAL: 80 MS
QT INTERVAL: 412 MS
QTC INTERVAL: 412 MS
T WAVE AXIS: 42 DEGREES
VENTRICULAR RATE: 60 BPM

## 2023-09-18 ENCOUNTER — APPOINTMENT (OUTPATIENT)
Dept: LAB | Facility: HOSPITAL | Age: 78
End: 2023-09-18
Payer: MEDICARE

## 2023-09-18 DIAGNOSIS — Z01.818 ENCOUNTER FOR PREADMISSION TESTING: ICD-10-CM

## 2023-09-18 LAB
APTT PPP: 31 SECONDS (ref 23–37)
INR PPP: 0.97 (ref 0.84–1.19)
PROTHROMBIN TIME: 13 SECONDS (ref 11.6–14.5)

## 2023-09-18 PROCEDURE — 85730 THROMBOPLASTIN TIME PARTIAL: CPT

## 2023-09-18 PROCEDURE — 85610 PROTHROMBIN TIME: CPT

## 2023-09-18 PROCEDURE — 36415 COLL VENOUS BLD VENIPUNCTURE: CPT

## 2023-09-21 DIAGNOSIS — E03.9 ACQUIRED HYPOTHYROIDISM: ICD-10-CM

## 2023-09-21 DIAGNOSIS — I10 PRIMARY HYPERTENSION: ICD-10-CM

## 2023-09-21 RX ORDER — LEVOTHYROXINE SODIUM 0.03 MG/1
25 TABLET ORAL EVERY MORNING
Qty: 90 TABLET | Refills: 0 | Status: SHIPPED | OUTPATIENT
Start: 2023-09-21

## 2023-10-12 ENCOUNTER — EVALUATION (OUTPATIENT)
Dept: PHYSICAL THERAPY | Facility: CLINIC | Age: 78
End: 2023-10-12
Payer: MEDICARE

## 2023-10-12 DIAGNOSIS — Z96.651 S/P TKR (TOTAL KNEE REPLACEMENT), RIGHT: Primary | ICD-10-CM

## 2023-10-12 PROCEDURE — 97161 PT EVAL LOW COMPLEX 20 MIN: CPT | Performed by: PHYSICAL THERAPIST

## 2023-10-12 NOTE — LETTER
2023    Michele Keene, [address] [facility]  [address]  [address] [address]    Patient: Bia Garcia   YOB: 1945   Date of Visit: 10/12/2023     Encounter Diagnosis     ICD-10-CM    1. S/P TKR (total knee replacement), right  Z96.651           Dear Dr. Ciara Maza: Thank you for your recent referral of Bia Garcia. Please review the attached evaluation summary from Agnieszka's recent visit. Please verify that you agree with the plan of care by signing the attached order. If you have any questions or concerns, please do not hesitate to call. I sincerely appreciate the opportunity to share in the care of one of your patients and hope to have another opportunity to work with you in the near future. Sincerely,    Katty Baker, PT      Referring Provider:      I certify that I have read the below Plan of Care and certify the need for these services furnished under this plan of treatment while under my care. Michele Keene DO   [facility]  [address]  [address] [address]  Via Fax: [phone]            PT Evaluation   Today's date: 10/12/2023  Patient name: Bia Garcia  : 1945  MRN: [id number]  Referring provider: Michele Keene DO  Dx:   Encounter Diagnosis     ICD-10-CM    1. S/P TKR (total knee replacement), right  Z96.651             Assessment  Assessment details: Patient is a 70 y.o. Female who presents to skilled outpatient PT for referring diagnoses include right TKR on , discharged next day to home and had home PT last day was yesterday. Pain level enteirng today is 3-4/10,  with ADL's:  3-4/10. Is using ice, and elevting, and doing HEP. Primary  impairment includes reduced ROM, strength, gait dysfunction. Reduced stability and reduced function. All typical of s/p right TKR. The aforementioned impairments have limited the patient's ability to ambulate, drive, sleep.  No further referral is necessary at this time based upon examination results. Patient education performed during today's session included: review of current HEP issued by home PT. Impairments: Abnormal gait, Abnormal or restricted ROM, Activity intolerance, Impaired balance, Impaired physical strength, Lacks appropriate HEP, Poor posture, Pain with function, and Weight-bearing intolerance  Understanding of Dx/Px/POC: Excellent  Prognosis: Good    Patient verbalized understanding of POC. Please contact me if you have any questions or recommendations. Thank you for the referral and the opportunity to share in [facility]. Plan  Plan details: LE stretching and strengthening, HEP development, gait and balance training, A/AA/PROM, and STM/MI prn Ktape PRN    Patient would benefit from: Skilled PT  Planned modality interventions: Cryotherapy  Planned therapy interventions: Balance, Functional ROM exercises, Gait training, HEP, Joint mobilization, Manual therapy, Neuromuscular re-education, Patient education, Strengthening, Stretching, and Therapeutic exercises  Frequency: 1-3x/wk  Duration in weeks: 8 weeks  Plan of Care beginning date: 10/12/23  Plan of Care expiration date: 2 months - 12/12/2023  Treatment plan discussed with: Patient       Goals  Short Term Goals (4 weeks):    - Patient will be independent in basic HEP 2-3 weeks  - Patient will demonstrate knee ROM knee flexion/extension to 110 deg/0 deg for ease with gait. - Patient will report 40% improvement in symptoms   - Patient will demonstrate >1/3 improvement in MMT grade as applicable    Long Term Goals (8 weeks):  - Patient will be independent in a comprehensive home exercise program  - Patient FOTO score will improve by 10 points.    - Patient will independently ambulate >1000 feet (community ambulation) with no assistive device  - Patient will self-report >70% improvement in function  - Patient functional goal to negotiate stairs reciprocally  - Patient functional goal to return to driving      [address]  - Steps to enter house: 2  - Stairs in house: flight   - Bedroom/bathroom set up: second floor  - Lives with: alone   -recreational actvities: cares for her home, part time lunch  at school. Objective     LE MMT  L Hip Flexion: 4/5  R Hip Flexion: 3-/5   L Hip Extension: 4/5 R Hip Extension: 4/5   L Hip Abduction: 4/5 R Hip Abduction: 4/5   L Hip Adduction: 4/5 R Hip Adduction: 4/5   L Knee Extension: 4+/5 R Knee Extension: 3+/5   L Knee Flexion: 4+/5 R Knee Flexion: 4-/5   L Ankle DF: 4+/5 R Ankle DF: 4+/5   L Ankle PF: 4+/5  R Ankle PF: 4+/5     LE ROM    L knee flexion: wnl degrees R knee flexion: 85 degrees AAROM   L knee extension: -8 degrees R knee extension: -4 degrees       Sensation  - Light touch: exquisitely tender to palpation on medial aspect    Knee Comments  - Functional squat: needs UE assistance to transfer sit/stand    Gait: amb with SPC: reduced stance time on right, with reduced knee extension during swing,     Inspection: + compression stocking to thigh. Insurance:  AMA/CMS Juventino Petersen #/ Referral # Start date  Expiration date Extension  Visit limitation? PT only or  PT+OT?  Co-Insurance   10/12/23  medicare                                                                Past Medical History:   Diagnosis Date   • Arthritis     left knee injection-2017   • Disease of thyroid gland     hypothyroidism   • DJD (degenerative joint disease)    • GERD (gastroesophageal reflux disease)    • Hypertension    • Seasonal allergies    • Wears glasses          Date 10/12/2023        Visit Number IE                 Manual          Eval                                    TherEx/NMR PROM: knee flex, ext         NuStep warmup Lv 1 5 min         Squats         Side stepping with loop         HR         SLR          Quad set W/ half roll : 5 sec x 10          LAQ 5 sec x 10          SAQ         Ankle pumps 20 x         Seated knee flexion with AAROM 5 sec x 10          Heel slides  W/ slide board: 15 x                                             TherAct         Patient education                                             Gait Training                                    Modalities         CP

## 2023-10-12 NOTE — PROGRESS NOTES
PT Evaluation   Today's date: 10/12/2023  Patient name: Kaity Rudolph  : 1945  MRN: [id number]  Referring provider: Albina Schaefer DO  Dx:   Encounter Diagnosis     ICD-10-CM    1. S/P TKR (total knee replacement), right  Z96.651             Assessment  Assessment details: Patient is a 70 y.o. Female who presents to skilled outpatient PT for referring diagnoses include right TKR on , discharged next day to home and had home PT last day was yesterday. Pain level enteirng today is 3-4/10,  with ADL's:  3-4/10. Is using ice, and elevting, and doing HEP. Primary  impairment includes reduced ROM, strength, gait dysfunction. Reduced stability and reduced function. All typical of s/p right TKR. The aforementioned impairments have limited the patient's ability to ambulate, drive, sleep. No further referral is necessary at this time based upon examination results. Patient education performed during today's session included: review of current HEP issued by home PT. Impairments: Abnormal gait, Abnormal or restricted ROM, Activity intolerance, Impaired balance, Impaired physical strength, Lacks appropriate HEP, Poor posture, Pain with function, and Weight-bearing intolerance  Understanding of Dx/Px/POC: Excellent  Prognosis: Good    Patient verbalized understanding of POC. Please contact me if you have any questions or recommendations. Thank you for the referral and the opportunity to share in [facility].     Plan  Plan details: LE stretching and strengthening, HEP development, gait and balance training, A/AA/PROM, and STM/MI prn Ktape PRN    Patient would benefit from: Skilled PT  Planned modality interventions: Cryotherapy  Planned therapy interventions: Balance, Functional ROM exercises, Gait training, HEP, Joint mobilization, Manual therapy, Neuromuscular re-education, Patient education, Strengthening, Stretching, and Therapeutic exercises  Frequency: 1-3x/wk  Duration in weeks: 8 weeks  Plan of Care beginning date: 10/12/23  Plan of Care expiration date: 2 months - 12/12/2023  Treatment plan discussed with: Patient       Goals  Short Term Goals (4 weeks):    - Patient will be independent in basic HEP 2-3 weeks  - Patient will demonstrate knee ROM knee flexion/extension to 110 deg/0 deg for ease with gait. - Patient will report 40% improvement in symptoms   - Patient will demonstrate >1/3 improvement in MMT grade as applicable    Long Term Goals (8 weeks):  - Patient will be independent in a comprehensive home exercise program  - Patient FOTO score will improve by 10 points. - Patient will independently ambulate >1000 feet (community ambulation) with no assistive device  - Patient will self-report >70% improvement in function  - Patient functional goal to negotiate stairs reciprocally  - Patient functional goal to return to driving      Subjective    Social Support  - Steps to enter house: 2  - Stairs in house: flight   - Bedroom/bathroom set up: second floor  - Lives with: alone   -recreational actvities: cares for her home, part time lunch  at school. Objective     LE MMT  L Hip Flexion: 4/5  R Hip Flexion: 3-/5   L Hip Extension: 4/5 R Hip Extension: 4/5   L Hip Abduction: 4/5 R Hip Abduction: 4/5   L Hip Adduction: 4/5 R Hip Adduction: 4/5   L Knee Extension: 4+/5 R Knee Extension: 3+/5   L Knee Flexion: 4+/5 R Knee Flexion: 4-/5   L Ankle DF: 4+/5 R Ankle DF: 4+/5   L Ankle PF: 4+/5  R Ankle PF: 4+/5     LE ROM    L knee flexion: wnl degrees R knee flexion: 85 degrees AAROM   L knee extension: -8 degrees R knee extension: -4 degrees       Sensation  - Light touch: exquisitely tender to palpation on medial aspect    Knee Comments  - Functional squat: needs UE assistance to transfer sit/stand    Gait: amb with SPC: reduced stance time on right, with reduced knee extension during swing,     Inspection: + compression stocking to thigh.          Insurance:  AMA/CMS Casi Efren #/ Referral # Start date  Expiration date Extension  Visit limitation? PT only or  PT+OT?  Co-Insurance   10/12/23  medicare                                                                Past Medical History:   Diagnosis Date    Arthritis     left knee injection-2017    Disease of thyroid gland     hypothyroidism    DJD (degenerative joint disease)     GERD (gastroesophageal reflux disease)     Hypertension     Seasonal allergies     Wears glasses          Date 10/12/2023        Visit Number IE                 Manual          Eval                                    TherEx/NMR PROM: knee flex, ext         NuStep warmup Lv 1 5 min         Squats         Side stepping with loop         HR         SLR          Quad set W/ half roll : 5 sec x 10          LAQ 5 sec x 10          SAQ         Ankle pumps 20 x         Seated knee flexion with AAROM 5 sec x 10          Heel slides  W/ slide board: 15 x                                             TherAct         Patient education                                             Gait Training                                    Modalities         CP

## 2023-10-16 ENCOUNTER — OFFICE VISIT (OUTPATIENT)
Dept: PHYSICAL THERAPY | Facility: CLINIC | Age: 78
End: 2023-10-16
Payer: MEDICARE

## 2023-10-16 DIAGNOSIS — Z96.651 S/P TKR (TOTAL KNEE REPLACEMENT), RIGHT: Primary | ICD-10-CM

## 2023-10-16 PROCEDURE — 97112 NEUROMUSCULAR REEDUCATION: CPT | Performed by: PHYSICAL THERAPIST

## 2023-10-16 PROCEDURE — 97110 THERAPEUTIC EXERCISES: CPT | Performed by: PHYSICAL THERAPIST

## 2023-10-16 NOTE — PROGRESS NOTES
Daily Note         Today's date: 10/16/2023  Patient name: Lindsey Giles  : 1945  MRN: [id number]  Referring provider: Holly Silver DO  Dx:   Encounter Diagnosis     ICD-10-CM    1. S/P TKR (total knee replacement), right  Z96.651           Subjective: Lindsey Giles reports she is having difficulty sleeping at night due to pain. States she has not been icing and is nauseous from taking her pain meds. Objective: See treatment diary below          Assessment:   patient needs increased manual and verbal cuing for proper form with quad set. Was successful when performed with SOS pulling into ankle DF and pushing into knee extension  . Patient had difficulty with resting with her knee in a fully extended position due to pulling in her knee. She has increased tenderness on medial aspect of knee. . she continues to wear her compression stockings. Advised patient to increase her icing 3-4 x day . Patient vocalized understanding. Plan: Progress treatment as tolerated. [facility]:  [facility] Ref[facility] # Start date  Expiration date Extension  Visit limitation? PT only or  PT+OT?  Co-Insurance   10/12/23  medicare                                                                Past Medical History:   Diagnosis Date    Arthritis     left knee injection-2017    Disease of thyroid gland     hypothyroidism    DJD (degenerative joint disease)     GERD (gastroesophageal reflux disease)     Hypertension     Seasonal allergies     Wears glasses        Surgery date: 23 right TKR   Date 10/12/2023 10/16/23       Visit Number IE 2                Manual          Eval  94 deg                                  TherEx/NMR PROM: knee flex, ext  Performed knee flexion,ext       NuStep warmup Lv 1 5 min  Lv 1 5 min        SLR   --       Quad set W/ half roll : 5 sec x 10   W/ SOS 5 sec x 10        LAQ 5 sec x 10   3-5 sec x hold 10 x        SAQ         Ankle pumps 20 x  20 x with half roll        Seated knee flexion with AAROM 5 sec x 10   --       Heel slides  W/ slide board: 15 x  W/ slideboard:        Knee flexion stretch on step  5 sec x 10         Amol heel raises  2 x 10         Mini squats holding onto bar  2 x 10         Step ups  4 in: 10 x 2        TherAct         Patient education                                             Gait Training                                    Modalities         CP  8 min ant and post

## 2023-10-18 ENCOUNTER — OFFICE VISIT (OUTPATIENT)
Dept: PHYSICAL THERAPY | Facility: CLINIC | Age: 78
End: 2023-10-18
Payer: MEDICARE

## 2023-10-18 DIAGNOSIS — Z96.651 S/P TKR (TOTAL KNEE REPLACEMENT), RIGHT: Primary | ICD-10-CM

## 2023-10-18 PROCEDURE — 97110 THERAPEUTIC EXERCISES: CPT | Performed by: PHYSICAL THERAPIST

## 2023-10-18 PROCEDURE — 97112 NEUROMUSCULAR REEDUCATION: CPT | Performed by: PHYSICAL THERAPIST

## 2023-10-18 NOTE — PROGRESS NOTES
Daily Note         Today's date: 10/18/2023  Patient name: Desiree Nguyen  : 1945  MRN: [id number]  Referring provider: Niall Brownlee DO  Dx:   Encounter Diagnosis     ICD-10-CM    1. S/P TKR (total knee replacement), right  Z96.651           Subjective: Agnieszka Camejo reports pain level entering today is 0/10. Still difficulty sleeping. Objective: See treatment diary below    Assessment:  Pt introduced to 6in steps ups this session without c/o exacerbation of symptoms. , Pt would benefit from continued PT. , Pt required verbal cues in order to perform therex with proper form. , and Pt introduced to hurdles forwad and lateral with cuing to correct form and aovid hip circumduction to advance left LE this visit. . The goal of the current treatment is to address patient's functional limitations as well as objective findings. Plan:  progress balance, ROM strength and gait        Insurance:  [employer] Roberth Mabry #/ Referral # Start date  Expiration date Extension  Visit limitation? PT only or  PT+OT? Co-Insurance   10/12/23  medicare                                                                Past Medical History:   Diagnosis Date    Arthritis     left knee injection-2017    Disease of thyroid gland     hypothyroidism    DJD (degenerative joint disease)     GERD (gastroesophageal reflux disease)     Hypertension     Seasonal allergies     Wears glasses        Surgery date: 23 right TKR   Date 10/12/2023 10/16/23 10/18/23      Visit Number IE 2 3               Manual          Eval  94 deg 95                                 TherEx/NMR PROM: knee flex, ext  Performed knee flexion,ext Performed knee flex. ext       NuStep warmup Lv 1 5 min  Lv 1 5 min  Lv 3 7 min NS      SLR   --       Quad set W/ half roll : 5 sec x 10   W/ SOS 5 sec x 10  W/ half roll: 5 sec x 10        LAQ 5 sec x 10   3-5 sec x hold 10 x  5 sec x 10       SAQ   10 x       Ankle pumps 20 x  20 x with half roll  20 x Seated knee flexion with AAROM 5 sec x 10   --       Heel slides  W/ slide board: 15 x  W/ slideboard:  W/ peanut: 5 sec x 10-15      Knee flexion stretch on step  5 sec x 10   5 sec x 10        Amol heel raises  2 x 10   2 x 10        Mini squats holding onto bar  2 x 10   2 x 10        Step ups  4 in: 10 x 2  6 in: 10 x 2      TherAct         Patient education                                             Gait Training         hurdles   4 hurdles: 3 cycles fwd, lat                         Modalities         CP  8 min ant and post

## 2023-10-20 ENCOUNTER — OFFICE VISIT (OUTPATIENT)
Dept: PHYSICAL THERAPY | Facility: CLINIC | Age: 78
End: 2023-10-20
Payer: MEDICARE

## 2023-10-20 DIAGNOSIS — Z96.651 S/P TKR (TOTAL KNEE REPLACEMENT), RIGHT: Primary | ICD-10-CM

## 2023-10-20 PROCEDURE — 97110 THERAPEUTIC EXERCISES: CPT | Performed by: PHYSICAL THERAPIST

## 2023-10-20 NOTE — PROGRESS NOTES
Daily Note         Today's date: 10/20/2023  Patient name: Ivelisse Avina  : 1945  MRN: [id number]  Referring provider: Corwin Oconnor DO  Dx:   Encounter Diagnosis     ICD-10-CM    1. S/P TKR (total knee replacement), right  Z96.651           Subjective: Ivelisse Avina reports reports she saw the doctor this am, he removed the dressing and was pleased with healing process. Patient reports she was sore after last session. States she scheduled her left TKR: 23       Objective: See treatment diary below    Assessment:   patient continues to demonstrated knee flexion at 95 deg. Full knee extension noted in supine. Patient encouraged to stretch knee flexion to end range to improve flexion and to continue icing consistently which will also increase ROM due to a reduction in swelling . Patient needs encouragement to facilitate quads with weightbearing activities and push knee straight. She is able to perform when cued. . The goal of the current treatment is to address patient's functional limitations as well as objective findings. Plan:  coninue to progress ROM strength and gait and balance         Insurance:  A/CMS Jordon Pagan #/ Referral # Start date  Expiration date Extension  Visit limitation? PT only or  PT+OT? Co-Insurance   10/12/23  medicare                                                                Past Medical History:   Diagnosis Date    Arthritis     left knee injection-2017    Disease of thyroid gland     hypothyroidism    DJD (degenerative joint disease)     GERD (gastroesophageal reflux disease)     Hypertension     Seasonal allergies     Wears glasses        Surgery date: 23 right TKR   Date 10/12/2023 10/16/23 10/18/23 10/20/23     Visit Number IE 2 3 4              Manual          Eval  94 deg 95 95                                TherEx/NMR PROM: knee flex, ext  Performed knee flexion,ext Performed knee flex. ext  Performed flex,ext n supine and sitting     NuStep warmup Lv 1 5 min  Lv 1 5 min  Lv 3 7 min NS NS: 10 min lv 3     SLR   --       Quad set W/ half roll : 5 sec x 10   W/ SOS 5 sec x 10  W/ half roll: 5 sec x 10   5 sec x 10       LAQ 5 sec x 10   3-5 sec x hold 10 x  5 sec x 10  5 sec x 10       SAQ   10 x       Ankle pumps 20 x  20 x with half roll  20 x  D/c      Seated knee flexion with AAROM 5 sec x 10   --       Heel slides  W/ slide board: 15 x  W/ slideboard:  W/ peanut: 5 sec x 10-15 W/ peanut 10 x       Knee flexion stretch on step  5 sec x 10   5 sec x 10   5 sec x 10       Amol heel raises  2 x 10   2 x 10   10 x 3     Mini squats holding onto bar  2 x 10   2 x 10   10 x 2      Step ups  4 in: 10 x 2  6 in: 10 x 2 10 x 2      TherAct         Patient education                                             Gait Training         hurdles   4 hurdles: 3 cycles fwd, lat  4 hurdles: 3 cycles fwd, lat                       Modalities         CP  8 min ant and post  deferred

## 2023-10-23 ENCOUNTER — APPOINTMENT (OUTPATIENT)
Dept: PHYSICAL THERAPY | Facility: CLINIC | Age: 78
End: 2023-10-23
Payer: MEDICARE

## 2023-10-25 ENCOUNTER — OFFICE VISIT (OUTPATIENT)
Dept: PHYSICAL THERAPY | Facility: CLINIC | Age: 78
End: 2023-10-25
Payer: MEDICARE

## 2023-10-25 DIAGNOSIS — Z96.651 S/P TKR (TOTAL KNEE REPLACEMENT), RIGHT: Primary | ICD-10-CM

## 2023-10-25 PROCEDURE — 97140 MANUAL THERAPY 1/> REGIONS: CPT | Performed by: PHYSICAL THERAPIST

## 2023-10-25 PROCEDURE — 97110 THERAPEUTIC EXERCISES: CPT | Performed by: PHYSICAL THERAPIST

## 2023-10-25 NOTE — PROGRESS NOTES
Daily Note         Today's date: 10/25/2023  Patient name: Gladis Martinez  : 1945  MRN: [id number]  Referring provider: Johanna Hinds DO  Dx:   Encounter Diagnosis     ICD-10-CM    1. S/P TKR (total knee replacement), right  Z96.651           Subjective: Gladis Martinez reports being concerned about the swelling that is still present and still has soreness. Burning in area of medial knee. States even the sheets at night bother her due to the sensitivity of her knee       Objective: See treatment diary below    Assessment:   patient entered without assistive device. Still has reduced terminal knee extension during terminal swing during gait. Patient increased knee flexion ROM to 100 deg this session. Has fair tolerance to PROM due to pain . Empty end feel noted. Patient advised to resume using her compression stockings due to her concern with swelling. Noted was a pocket of swelling in medial aspect of knee  therefore performed retrograde massage with leg elevated. Plan:  progress as toelrated to focus on knee ROM, strength and gait. Insurance:  AMA/CMS Lamar Shrestha #/ Referral # Start date  Expiration date Extension  Visit limitation? PT only or  PT+OT? Co-Insurance   10/12/23  medicare                                                                Past Medical History:   Diagnosis Date    Arthritis     left knee injection-2017    Disease of thyroid gland     hypothyroidism    DJD (degenerative joint disease)     GERD (gastroesophageal reflux disease)     Hypertension     Seasonal allergies     Wears glasses        Surgery date: 23 right TKR   Date 10/12/2023 10/16/23 10/18/23 10/20/23 10/25/23    Visit Number IE 2 3 4 5         FOTO performed:     Manual          Eval  94 deg 95 95 100 deg         Retrograde massage with leg elevated                      TherEx/NMR PROM: knee flex, ext  Performed knee flexion,ext Performed knee flex. ext  Performed flex,ext n supine and sitting Supine: flexion ext    NuStep warmup Lv 1 5 min  Lv 1 5 min  Lv 3 7 min NS NS: 10 min lv 3 NS: lv 3 10 min     SLR   --       Quad set W/ half roll : 5 sec x 10   W/ SOS 5 sec x 10  W/ half roll: 5 sec x 10   5 sec x 10   5 sec x 10      LAQ 5 sec x 10   3-5 sec x hold 10 x  5 sec x 10  5 sec x 10   10 x 2     SAQ   10 x       Seated knee flexion with AAROM 5 sec x 10   --       Heel slides  W/ slide board: 15 x  W/ slideboard:  W/ peanut: 5 sec x 10-15 W/ peanut 10 x   W/ SOS + slideboard 5 sec x 15     Knee flexion stretch on step  5 sec x 10   5 sec x 10   5 sec x 10   5 sec x 10      Amol heel raises  2 x 10   2 x 10   10 x 3 10 x 3     Mini squats holding onto bar  2 x 10   2 x 10   10 x 2  10 x 3    Step ups  4 in: 10 x 2  6 in: 10 x 2 10 x 2  6 in :10 x 2     TherAct         Patient education                                             Gait Training         hurdles   4 hurdles: 3 cycles fwd, lat  4 hurdles: 3 cycles fwd, lat                       Modalities         CP  8 min ant and post  deferred 7  min ant and post knee

## 2023-10-26 ENCOUNTER — OFFICE VISIT (OUTPATIENT)
Dept: PHYSICAL THERAPY | Facility: CLINIC | Age: 78
End: 2023-10-26
Payer: MEDICARE

## 2023-10-26 DIAGNOSIS — Z96.651 S/P TKR (TOTAL KNEE REPLACEMENT), RIGHT: Primary | ICD-10-CM

## 2023-10-26 PROCEDURE — 97110 THERAPEUTIC EXERCISES: CPT | Performed by: PHYSICAL THERAPIST

## 2023-10-26 NOTE — PROGRESS NOTES
Daily Note         Today's date: 10/26/2023  Patient name: Gibran Peguero  : 1945  MRN: [id number]  Referring provider: Britton Salcido DO  Dx:   Encounter Diagnosis     ICD-10-CM    1. S/P TKR (total knee replacement), right  Z96.651           Subjective: Gibran Peguero reports she slept better last night. States she fell asleep easier and slept for 5 hours. Objective: See treatment diary below    Assessment:   patient needs cuing to avoid pulling up stairs on 6 in step with step ups. Is able to self correct with cuing. Patient unable to perform leg press due to pain on medial aspect of lright knee. "Catching pain"  therefore held that exercise. Needed dean UE support with SLS activities. Issued tubi  10 mm size G  to assist in reducing swelling due to patient has difficulty putting compression stockings on her self and she lives alone. Plan:  focus on knee ROM, quad strengthening and gait/balance. [facility]:  [facility] Ref[facility] # Start date  Expiration date Extension  Visit limitation? PT only or  PT+OT? Co-Insurance   10/12/23  medicare                                                                Past Medical History:   Diagnosis Date    Arthritis     left knee injection-2017    Disease of thyroid gland     hypothyroidism    DJD (degenerative joint disease)     GERD (gastroesophageal reflux disease)     Hypertension     Seasonal allergies     Wears glasses        Surgery date: 23 right TKR   Date 10/12/2023 10/16/23 10/18/23 10/20/23 10/25/23 10/26/23   Visit Number IE 2 3 4 5 6        FOTO performed:     Manual          Eval  94 deg 95 95 100 deg         Retrograde massage with leg elevated                      TherEx/NMR PROM: knee flex, ext  Performed knee flexion,ext Performed knee flex. ext  Performed flex,ext n supine and sitting Supine: flexion ext    NuStep warmup Lv 1 5 min  Lv 1 5 min  Lv 3 7 min NS NS: 10 min lv 3 NS: lv 3 10 min  NS: 5 min lv 3    SLR   --       Quad set W/ half roll : 5 sec x 10   W/ SOS 5 sec x 10  W/ half roll: 5 sec x 10   5 sec x 10   5 sec x 10      LAQ 5 sec x 10   3-5 sec x hold 10 x  5 sec x 10  5 sec x 10   10 x 2  2 x 10     SAQ   10 x       Seated knee flexion with AAROM 5 sec x 10   --       Heel slides  W/ slide board: 15 x  W/ slideboard:  W/ peanut: 5 sec x 10-15 W/ peanut 10 x   W/ SOS + slideboard 5 sec x 15  W/ peanut: 10 x   W/ hamstring curl: 10 x             Knee flexion stretch on step  5 sec x 10   5 sec x 10   5 sec x 10   5 sec x 10   5 sec x 10     Amol heel raises  2 x 10   2 x 10   10 x 3 10 x 3  10 x 3   Mini squats holding onto bar  2 x 10   2 x 10   10 x 2  10 x 3 Sit/stands form chair: 10 x 2    Step ups  4 in: 10 x 2  6 in: 10 x 2 10 x 2  6 in :10 x 2  6 in: 10 x 3    Leg press       Unable due to pain    SLS      5 sec x 10     TherAct         Patient education                                             Gait Training         hurdles   4 hurdles: 3 cycles fwd, lat  4 hurdles: 3 cycles fwd, lat  4 hurdles: 3 cycles fwd, lat                     Modalities         CP  8 min ant and post  deferred 7  min ant and post knee

## 2023-10-30 ENCOUNTER — OFFICE VISIT (OUTPATIENT)
Dept: PHYSICAL THERAPY | Facility: CLINIC | Age: 78
End: 2023-10-30
Payer: MEDICARE

## 2023-10-30 DIAGNOSIS — Z96.651 S/P TKR (TOTAL KNEE REPLACEMENT), RIGHT: Primary | ICD-10-CM

## 2023-10-30 PROCEDURE — 97140 MANUAL THERAPY 1/> REGIONS: CPT | Performed by: PHYSICAL THERAPIST

## 2023-10-30 PROCEDURE — 97110 THERAPEUTIC EXERCISES: CPT | Performed by: PHYSICAL THERAPIST

## 2023-10-30 NOTE — PROGRESS NOTES
Daily Note         Today's date: 10/30/2023  Patient name: Ally Matthew  : 1945  MRN: [id number]  Referring provider: Mu Sal DO  Dx:   Encounter Diagnosis     ICD-10-CM    1. S/P TKR (total knee replacement), right  Z96.651           Subjective: Ally Matthew reports she has resumed driving. Was able to pick right LE and place into bath tub. States she sleeps better after the massage       Objective: See treatment diary below    Assessment:   margaret needs cuing for quad set prior to SLR. Demonstrates mild terminal knee extension lag with SLR. Only fair tolerance to PROM knee flex and extension in supine. Requested PROM knee flexion in sitting but experienced pain at end range. Plan:  progress ROM , gati and strength        Insurance:  [place] #/ Referral # Start date  Expiration date Extension  Visit limitation? PT only or  PT+OT? Co-Insurance   10/12/23  medicare                                                                Past Medical History:   Diagnosis Date    Arthritis     left knee injection-    Disease of thyroid gland     hypothyroidism    DJD (degenerative joint disease)     GERD (gastroesophageal reflux disease)     Hypertension     Seasonal allergies     Wears glasses        Surgery date: 23 right TKR   Date 10/16/23 10/18/23 10/20/23 10/25/23 10/26/23 10/30/23   Visit Number 2 3 4 5 6 7       FOTO performed     Manual          94 deg 95 95 100 deg         Retrograde massage with leg elevated                       TherEx/NMR Performed knee flexion,ext Performed knee flex. ext  Performed flex,ext n supine and sitting Supine: flexion ext  Performed flex,ext   NuStep warmup Lv 1 5 min  Lv 3 7 min NS NS: 10 min lv 3 NS: lv 3 10 min  NS: 5 min lv 3  NS: 10 min     SLR  --     10 x 2 w/ emphasis on quad set    Quad set W/ SOS 5 sec x 10  W/ half roll: 5 sec x 10   5 sec x 10   5 sec x 10    --   LAQ 3-5 sec x hold 10 x  5 sec x 10  5 sec x 10   10 x 2 2 x 10   5 sec x 2 x 10     SAQ  10 x        Heel slides  W/ slideboard:  W/ peanut: 5 sec x 10-15 W/ peanut 10 x   W/ SOS + slideboard 5 sec x 15  W/ peanut: 10 x   W/ hamstring curl: 10 x  W/ peanut: 10 x   W/ hamstring curl: 10 x    Knee flexion stretch on step 5 sec x 10   5 sec x 10   5 sec x 10   5 sec x 10   5 sec x 10   5 sec x10    Amol heel raises 2 x 10   2 x 10   10 x 3 10 x 3  10 x 3 10 x 3    Mini squats holding onto bar 2 x 10   2 x 10   10 x 2  10 x 3 Sit/stands form chair: 10 x 2  TRX 10 x 2   Step ups 4 in: 10 x 2  6 in: 10 x 2 10 x 2  6 in :10 x 2  6 in: 10 x 3  Lv 2 10 x 2 forward, lateral    Leg press      Unable due to pain  --   SLS     5 sec x 10   5-7 sec hold 10 x    TherAct         Patient education                                             Gait Training         hurdles  4 hurdles: 3 cycles fwd, lat  4 hurdles: 3 cycles fwd, lat  4 hurdles: 3 cycles fwd, lat --                     Modalities         CP 8 min ant and post  deferred 7  min ant and post knee  5 min

## 2023-10-31 ENCOUNTER — OFFICE VISIT (OUTPATIENT)
Dept: PHYSICAL THERAPY | Facility: CLINIC | Age: 78
End: 2023-10-31
Payer: MEDICARE

## 2023-10-31 DIAGNOSIS — Z96.651 S/P TKR (TOTAL KNEE REPLACEMENT), RIGHT: Primary | ICD-10-CM

## 2023-10-31 PROCEDURE — 97140 MANUAL THERAPY 1/> REGIONS: CPT

## 2023-10-31 PROCEDURE — 97110 THERAPEUTIC EXERCISES: CPT

## 2023-10-31 NOTE — PROGRESS NOTES
Daily Note     Today's date: 10/31/2023  Patient name: Matthias Draper  : 1945  MRN: [id number]  Referring provider: Guzman Chatterjee DO  Dx:   Encounter Diagnosis     ICD-10-CM    1. S/P TKR (total knee replacement), right  Z96.651                      Subjective: Pt continues to have some swelling and medial knee discomfort. Reports she had a spitting stitch that she pulled out and area bled. No active drainage now. Objective: See treatment diary below      Assessment: Pt was able to complete full revolution on bike. Flexion measured at 100* today. Plan: Continue per plan of care. Insurance:  [employer] Loraine Carbajal #/ Referral # Start date  Expiration date Extension  Visit limitation? PT only or  PT+OT?  Co-Insurance   10/12/23  medicare                                                                Past Medical History:   Diagnosis Date    Arthritis     left knee injection-    Disease of thyroid gland     hypothyroidism    DJD (degenerative joint disease)     GERD (gastroesophageal reflux disease)     Hypertension     Seasonal allergies     Wears glasses        Surgery date: 23 right TKR   Date 10/20/23 10/25/23 10/26/23 10/30/23 10/31/23   Visit Number 4 5 6 7 8     FOTO performed      Manual         95 100 deg        Retrograde massage with leg elevated                      TherEx/NMR Performed flex,ext n supine and sitting Supine: flexion ext  Performed flex,ext    NuStep warmup NS: 10 min lv 3 NS: lv 3 10 min  NS: 5 min lv 3  NS: 10 min   NS 5 min  RB 5 min   SLR     10 x 2 w/ emphasis on quad set     Quad set 5 sec x 10   5 sec x 10    --    LAQ 5 sec x 10   10 x 2  2 x 10   5 sec x 2 x 10   5" 2x10   SAQ        Heel slides  W/ peanut 10 x   W/ SOS + slideboard 5 sec x 15  W/ peanut: 10 x   W/ hamstring curl: 10 x  W/ peanut: 10 x   W/ hamstring curl: 10 x  W/ peanut: 10 x   W/ hamstring curl: 10 x    Knee flexion stretch on step 5 sec x 10   5 sec x 10   5 sec x 10   5 sec x10 5"x10   Amol heel raises 10 x 3 10 x 3  10 x 3 10 x 3  10x3   Mini squats holding onto bar 10 x 2  10 x 3 Sit/stands form chair: 10 x 2  TRX 10 x 2 TRX 2x10   Step ups 10 x 2  6 in :10 x 2  6 in: 10 x 3  Lv 2 10 x 2 forward, lateral  Lv 2 10x2 fwd/lat   Leg press    Unable due to pain  --    SLS   5 sec x 10   5-7 sec hold 10 x     TherAct        Patient education                                        Gait Training        hurdles 4 hurdles: 3 cycles fwd, lat  4 hurdles: 3 cycles fwd, lat --                    Modalities        CP deferred 7  min ant and post knee  5 min

## 2023-10-31 NOTE — PROGRESS NOTES
Daily Note         Today's date: 10/31/2023  Patient name: Alice Timmnos  : 1945  MRN: [id number]  Referring provider: Mary Graham DO  Dx:   Encounter Diagnosis     ICD-10-CM    1. S/P TKR (total knee replacement), right  Z96.651           Subjective: Alice Timmons reports she had a spitting stitch that she pulled out and area bled. No active drainage now. Objective: See treatment diary below    Assessment:  {ASSESSMENT:54630}. The goal of the current treatment is to address patient's functional limitations as well as objective findings. Plan: {EFDB:90128}        Insurance:  [place] #/ Referral # Start date  Expiration date Extension  Visit limitation? PT only or  PT+OT? Co-Insurance   10/12/23  medicare                                                                Past Medical History:   Diagnosis Date    Arthritis     left knee injection-    Disease of thyroid gland     hypothyroidism    DJD (degenerative joint disease)     GERD (gastroesophageal reflux disease)     Hypertension     Seasonal allergies     Wears glasses        Surgery date: 23 right TKR   Date 10/16/23 10/18/23 10/20/23 10/25/23 10/26/23 10/30/23   Visit Number 2 3 4 5 6 7       FOTO performed     Manual          94 deg 95 95 100 deg         Retrograde massage with leg elevated                       TherEx/NMR Performed knee flexion,ext Performed knee flex. ext  Performed flex,ext n supine and sitting Supine: flexion ext  Performed flex,ext   NuStep warmup Lv 1 5 min  Lv 3 7 min NS NS: 10 min lv 3 NS: lv 3 10 min  NS: 5 min lv 3  NS: 10 min     SLR  --     10 x 2 w/ emphasis on quad set    Quad set W/ SOS 5 sec x 10  W/ half roll: 5 sec x 10   5 sec x 10   5 sec x 10    --   LAQ 3-5 sec x hold 10 x  5 sec x 10  5 sec x 10   10 x 2  2 x 10   5 sec x 2 x 10     SAQ  10 x        Heel slides  W/ slideboard:  W/ peanut: 5 sec x 10-15 W/ peanut 10 x   W/ SOS + slideboard 5 sec x 15  W/ peanut: 10 x   W/ hamstring curl: 10 x  W/ peanut: 10 x   W/ hamstring curl: 10 x    Knee flexion stretch on step 5 sec x 10   5 sec x 10   5 sec x 10   5 sec x 10   5 sec x 10   5 sec x10    Amol heel raises 2 x 10   2 x 10   10 x 3 10 x 3  10 x 3 10 x 3    Mini squats holding onto bar 2 x 10   2 x 10   10 x 2  10 x 3 Sit/stands form chair: 10 x 2  TRX 10 x 2   Step ups 4 in: 10 x 2  6 in: 10 x 2 10 x 2  6 in :10 x 2  6 in: 10 x 3  Lv 2 10 x 2 forward, lateral    Leg press      Unable due to pain  --   SLS     5 sec x 10   5-7 sec hold 10 x    TherAct         Patient education                                             Gait Training         hurdles  4 hurdles: 3 cycles fwd, lat  4 hurdles: 3 cycles fwd, lat  4 hurdles: 3 cycles fwd, lat --                     Modalities         CP 8 min ant and post  deferred 7  min ant and post knee  5 min

## 2023-11-02 ENCOUNTER — OFFICE VISIT (OUTPATIENT)
Dept: PHYSICAL THERAPY | Facility: CLINIC | Age: 78
End: 2023-11-02
Payer: MEDICARE

## 2023-11-02 DIAGNOSIS — Z96.651 S/P TKR (TOTAL KNEE REPLACEMENT), RIGHT: Primary | ICD-10-CM

## 2023-11-02 PROCEDURE — 97140 MANUAL THERAPY 1/> REGIONS: CPT | Performed by: PHYSICAL THERAPIST

## 2023-11-02 PROCEDURE — 97110 THERAPEUTIC EXERCISES: CPT | Performed by: PHYSICAL THERAPIST

## 2023-11-02 NOTE — PROGRESS NOTES
Daily Note         Today's date: 2023  Patient name: Erik Garcia  : 1945  MRN: [id number]  Referring provider: Keira Cantu DO  Dx:   Encounter Diagnosis     ICD-10-CM    1. S/P TKR (total knee replacement), right  Z96.651           Subjective: Erik Garcia reports pain level entering today is 3/10. States she is sore this am.  And describes numbness in anterior knee which has not been present in a while. Patient frustrated that her knee is still sore. Objective: See treatment diary below    Assessment:   patient demonstrated some swelling and mild warmth to left knee which is consistent with the swelling. Patient enters with no assistive device for ambulation, but still demonstrates reduced terminal knee extension during gait and reduced knee mobility in general.  Knee ROM measurements not taken this session due to patient demonstrating frustration on soreness and swelling that still occurs. Patient advised setbacks can be a part of the process and encouraged patient to increase icing and reduce actvitiy over next few days to allow the swelling to reduce patient vocalized understanding. Plan: Progress treatment as tolerated. Insurance:  AMA/CMS Ivan Palma #/ Referral # Start date  Expiration date Extension  Visit limitation? PT only or  PT+OT?  Co-Insurance   10/12/23  medicare                                                                Past Medical History:   Diagnosis Date    Arthritis     left knee injection-2017    Disease of thyroid gland     hypothyroidism    DJD (degenerative joint disease)     GERD (gastroesophageal reflux disease)     Hypertension     Seasonal allergies     Wears glasses        Surgery date: 23 right TKR   Date 10/20/23 10/25/23 10/26/23 10/30/23 10/31/23 11//2/23   Visit Number 4 5 6 7 8 9     FOTO performed       Manual          95 100 deg         Retrograde massage with leg elevated    Stm medial knee TherEx/NMR Performed flex,ext n supine and sitting Supine: flexion ext  Performed flex,ext     NuStep warmup NS: 10 min lv 3 NS: lv 3 10 min  NS: 5 min lv 3  NS: 10 min   NS 5 min  RB 5 min  Rec bike: 5 min    SLR     10 x 2 w/ emphasis on quad set   10 x 2   Quad set 5 sec x 10   5 sec x 10    --     LAQ 5 sec x 10   10 x 2  2 x 10   5 sec x 2 x 10   5" 2x10 5 sec 10 x 2    SAQ      W/ half roll: 2 x 10     Heel slides  W/ peanut 10 x   W/ SOS + slideboard 5 sec x 15  W/ peanut: 10 x   W/ hamstring curl: 10 x  W/ peanut: 10 x   W/ hamstring curl: 10 x  W/ peanut: 10 x   W/ hamstring curl: 10 x     Knee flexion stretch on step 5 sec x 10   5 sec x 10   5 sec x 10   5 sec x10  5"x10 5 sec x 10     Amol heel raises 10 x 3 10 x 3  10 x 3 10 x 3  10x3 10 x 3   Mini squats holding onto bar 10 x 2  10 x 3 Sit/stands form chair: 10 x 2  TRX 10 x 2 TRX 2x10    Step ups 10 x 2  6 in :10 x 2  6 in: 10 x 3  Lv 2 10 x 2 forward, lateral  Lv 2 10x2 fwd/lat    Leg press    Unable due to pain  --  --   SLS   5 sec x 10   5-7 sec hold 10 x      TherAct         Patient education                                             Gait Training         hurdles 4 hurdles: 3 cycles fwd, lat  4 hurdles: 3 cycles fwd, lat --                       Modalities         CP deferred 7  min ant and post knee  5 min

## 2023-11-06 ENCOUNTER — OFFICE VISIT (OUTPATIENT)
Dept: PHYSICAL THERAPY | Facility: CLINIC | Age: 78
End: 2023-11-06
Payer: MEDICARE

## 2023-11-06 DIAGNOSIS — Z96.651 S/P TKR (TOTAL KNEE REPLACEMENT), RIGHT: Primary | ICD-10-CM

## 2023-11-06 PROCEDURE — 97110 THERAPEUTIC EXERCISES: CPT | Performed by: PHYSICAL THERAPIST

## 2023-11-06 PROCEDURE — 97112 NEUROMUSCULAR REEDUCATION: CPT | Performed by: PHYSICAL THERAPIST

## 2023-11-06 NOTE — PROGRESS NOTES
Daily Note /PROGRESS NOTE        Today's date: 2023  Patient name: Jose Cabral  : 1945  MRN: [id number]  Referring provider: Marni Duque DO  Dx:   Encounter Diagnosis     ICD-10-CM    1. S/P TKR (total knee replacement), right  Z96.651           Subjective: Jose Cabral reports she has been sleeping well. States she reduced her activity level on Friday which brought pain down. Objective: See treatment diary below    Goals (23)  Short Term Goals (4 weeks):    - Patient will be independent in basic HEP 2-3 weeks met   - Patient will demonstrate knee ROM knee flexion/extension to 110 deg/0 deg for ease with gait. Not met   - Patient will report 40% improvement in symptoms NT  - Patient will demonstrate >1/3 improvement in MMT grade as applicable met     Long Term Goals (8 weeks):  - Patient will be independent in a comprehensive home exercise program still progressing  - Patient FOTO score will improve by 10 points. - Patient will independently ambulate >1000 feet (community ambulation) with no assistive device not met   - Patient will self-report >70% improvement in function NT   - Patient functional goal to negotiate stairs reciprocally not met consistently  - Patient functional goal to return to driving not met       Subjective    Social Support  - Steps to enter house: 2  - Stairs in house: flight   - Bedroom/bathroom set up: second floor  - Lives with: alone   -recreational actvities: cares for her home, part time lunch  at school.       Objective  (23    LE MMT  L Hip Flexion: 4/5  R Hip Flexion: 3-/5(4-/5)   L Hip Extension: 4/5 R Hip Extension: 4/5   L Hip Abduction: 4/5 R Hip Abduction: 4/5   L Hip Adduction: 4/5 R Hip Adduction: 4/5   L Knee Extension: 4+/5 R Knee Extension: 3+/5 (4-/5)   L Knee Flexion: 4+/5 R Knee Flexion: 4-/ (4/5)   L Ankle DF: 4+/5 R Ankle DF: 4+/5   L Ankle PF: 4+/5  R Ankle PF: 4+/5     LE ROM    L knee flexion: wnl degrees R knee flexion: 85 degrees AAROM (105)   L knee extension: -8 degrees  R knee extension: -4 degrees (status quo)        Sensation  - Light touch: exquisitely tender to palpation on medial  (status quo)     Knee Comments  - Functional squat: needs UE assistance to transfer sit/stand (status quo)     Gait: amb with SPC: reduced stance time on right, with reduced knee extension during swing, (household distances no assistive device)    Inspection: + compression stocking to thigh. ( Not utilizing any more)    Assessment:   patient with excellent tolerance to there ex today and PROM knee flexion/ext. patient increased her knee flexion this session to 105. Patient demonstrated reduction in swelling and tenderness throughout the knee to palpation. Knee no longer feels "numb" per patient report. Still demonstrated reduced knee extension during terminal swing during gait. Guzman Chatterjee will continue to benefit from PT due to paitent continues to have restricted ROM and limited strength and she is scheduled for left TKR first week in December. Goal status as indicated above. Plan:  cotninue with strengthening gait and balance training, ROM   (11/6/23--> 12/6/23) 1-3 x week 4-6 weeks        Insurance:  AMA/CMS Loraine Carbajal #/ Referral # Start date  Expiration date Extension  Visit limitation? PT only or  PT+OT?  Co-Insurance   10/12/23  medicare                                                                Past Medical History:   Diagnosis Date    Arthritis     left knee injection-2017    Disease of thyroid gland     hypothyroidism    DJD (degenerative joint disease)     GERD (gastroesophageal reflux disease)     Hypertension     Seasonal allergies     Wears glasses        Surgery date: 9/25/23 right TKR   Date 10/25/23 10/26/23 10/30/23 10/31/23 11//2/23 11/6/23   Visit Number 5 6 7 8 9 10    FOTO performed        Manual          100 deg     105 deg     Retrograde massage with leg elevated    Stm medial knee TherEx/NMR Supine: flexion ext  Performed flex,ext   performed   NuStep warmup NS: lv 3 10 min  NS: 5 min lv 3  NS: 10 min   NS 5 min  RB 5 min  Rec bike: 5 min  NS: 10 min lv 4    SLR    10 x 2 w/ emphasis on quad set   10 x 2 10 x 2    Quad set 5 sec x 10    --      LAQ 10 x 2  2 x 10   5 sec x 2 x 10   5" 2x10 5 sec 10 x 2  1.5 lb: 10 x 2    SAQ     W/ half roll: 2 x 10   1.5 lb: 10 x 2    Heel slides  W/ SOS + slideboard 5 sec x 15  W/ peanut: 10 x   W/ hamstring curl: 10 x  W/ peanut: 10 x   W/ hamstring curl: 10 x  W/ peanut: 10 x   W/ hamstring curl: 10 x   20 x    Knee flexion stretch on step 5 sec x 10   5 sec x 10   5 sec x10  5"x10 5 sec x 10   5 sec x 10     Amol heel raises 10 x 3  10 x 3 10 x 3  10x3 10 x 3 10 x 3    Mini squats holding onto bar 10 x 3 Sit/stands form chair: 10 x 2  TRX 10 x 2 TRX 2x10  TRX: 2 x 10 tap to standard chair    Step ups 6 in :10 x 2  6 in: 10 x 3  Lv 2 10 x 2 forward, lateral  Lv 2 10x2 fwd/lat  6 in: 10 x 2 fwd, lat   Leg press   Unable due to pain  --  -- 55 lb: 10 x   65 lb: 10 x   75 lb 10 x    SLS  5 sec x 10   5-7 sec hold 10 x    Next visit    TherAct         Patient education                                             Gait Training         hurdles  4 hurdles: 3 cycles fwd, lat --                        Modalities         CP 7  min ant and post knee  5 min    5 min

## 2023-11-06 NOTE — LETTER
2023    Clifford Dang, [address] [facility]  [address]  [address] [facility]    Patient: Daniel Bruno   YOB: 1945   Date of Visit: 2023     Encounter Diagnosis     ICD-10-CM    1. S/P TKR (total knee replacement), right  Z96.651           Dear Dr. Adonay Kasper: Thank you for your recent referral of Daniel Bruno. Please review the attached evaluation summary from Agnieszka's recent visit. Please verify that you agree with the plan of care by signing the attached order. If you have any questions or concerns, please do not hesitate to call. I sincerely appreciate the opportunity to share in the care of one of your patients and hope to have another opportunity to work with you in the near future. Sincerely,    Taylor Ross, PT      Referring Provider:      I certify that I have read the below Plan of Care and certify the need for these services furnished under this plan of treatment while under my care. Clifford Dang DO   [facility]  [address]  [address] [address] [address]  Via Fax: [phone]                  Daily Note /PROGRESS NOTE        Today's date: 2023  Patient name: Daniel Bruno  : 1945  MRN: [id number]  Referring provider: Clifford Dang DO  Dx:   Encounter Diagnosis     ICD-10-CM    1. S/P TKR (total knee replacement), right  Z96.651           Subjective: Daniel Bruno reports she has been sleeping well. States she reduced her activity level on Friday which brought pain down. Objective: See treatment diary below    Goals (23)  Short Term Goals (4 weeks):    - Patient will be independent in basic HEP 2-3 weeks met   - Patient will demonstrate knee ROM knee flexion/extension to 110 deg/0 deg for ease with gait.   Not met   - Patient will report 40% improvement in symptoms NT  - Patient will demonstrate >1/3 improvement in MMT grade as applicable met     Long Term Goals (8 weeks):  - Patient will be independent in a comprehensive home exercise program still progressing  - Patient FOTO score will improve by 10 points. - Patient will independently ambulate >1000 feet (community ambulation) with no assistive device not met   - Patient will self-report >70% improvement in function NT   - Patient functional goal to negotiate stairs reciprocally not met consistently  - Patient functional goal to return to driving not met       Subjective    Social Support  - Steps to enter house: 2  - Stairs in house: flight   - Bedroom/bathroom set up: second floor  - Lives with: alone   -recreational actvities: cares for her home, part time lunch  at school. Objective  (11/6/23    LE MMT  L Hip Flexion: 4/5  R Hip Flexion: 3-/5(4-/5)   L Hip Extension: 4/5 R Hip Extension: 4/5   L Hip Abduction: 4/5 R Hip Abduction: 4/5   L Hip Adduction: 4/5 R Hip Adduction: 4/5   L Knee Extension: 4+/5 R Knee Extension: 3+/5 (4-/5)   L Knee Flexion: 4+/5 R Knee Flexion: 4-/ (4/5)   L Ankle DF: 4+/5 R Ankle DF: 4+/5   L Ankle PF: 4+/5  R Ankle PF: 4+/5     LE ROM    L knee flexion: wnl degrees R knee flexion: 85 degrees AAROM (105)   L knee extension: -8 degrees  R knee extension: -4 degrees (status quo)        Sensation  - Light touch: exquisitely tender to palpation on medial  (status quo)     Knee Comments  - Functional squat: needs UE assistance to transfer sit/stand (status quo)     Gait: amb with SPC: reduced stance time on right, with reduced knee extension during swing, (household distances no assistive device)    Inspection: + compression stocking to thigh. ( Not utilizing any more)    Assessment:   patient with excellent tolerance to there ex today and PROM knee flexion/ext. patient increased her knee flexion this session to 105. Patient demonstrated reduction in swelling and tenderness throughout the knee to palpation. Knee no longer feels "numb" per patient report.   Still demonstrated reduced knee extension during terminal swing during gait. Guzman Chatterjee will continue to benefit from PT due to paitent continues to have restricted ROM and limited strength and she is scheduled for left TKR first week in December. Goal status as indicated above. Plan:  cotninue with strengthening gait and balance training, ROM   (11/6/23--> 12/6/23) 1-3 x week 4-6 weeks        Insurance:  [place]/CMS Loraine Carbajal #/ Referral # Start date  Expiration date Extension  Visit limitation? PT only or  PT+OT?  Co-Insurance   10/12/23  medicare                                                                Past Medical History:   Diagnosis Date   • Arthritis     left knee injection-2017   • Disease of thyroid gland     hypothyroidism   • DJD (degenerative joint disease)    • GERD (gastroesophageal reflux disease)    • Hypertension    • Seasonal allergies    • Wears glasses        Surgery date: 9/25/23 right TKR   Date 10/25/23 10/26/23 10/30/23 10/31/23 11//2/23 11/6/23   Visit Number 5 6 7 8 9 10    FOTO performed        Manual          100 deg     105 deg     Retrograde massage with leg elevated    Stm medial knee                      TherEx/NMR Supine: flexion ext  Performed flex,ext   performed   NuStep warmup NS: lv 3 10 min  NS: 5 min lv 3  NS: 10 min   NS 5 min  RB 5 min  Rec bike: 5 min  NS: 10 min lv 4    SLR    10 x 2 w/ emphasis on quad set   10 x 2 10 x 2    Quad set 5 sec x 10    --      LAQ 10 x 2  2 x 10   5 sec x 2 x 10   5" 2x10 5 sec 10 x 2  1.5 lb: 10 x 2    SAQ     W/ half roll: 2 x 10   1.5 lb: 10 x 2    Heel slides  W/ SOS + slideboard 5 sec x 15  W/ peanut: 10 x   W/ hamstring curl: 10 x  W/ peanut: 10 x   W/ hamstring curl: 10 x  W/ peanut: 10 x   W/ hamstring curl: 10 x   20 x    Knee flexion stretch on step 5 sec x 10   5 sec x 10   5 sec x10  5"x10 5 sec x 10   5 sec x 10     Amol heel raises 10 x 3  10 x 3 10 x 3  10x3 10 x 3 10 x 3    Mini squats holding onto bar 10 x 3 Sit/stands form chair: 10 x 2  TRX 10 x 2 TRX 2x10  TRX: 2 x 10 tap to standard chair    Step ups 6 in :10 x 2  6 in: 10 x 3  Lv 2 10 x 2 forward, lateral  Lv 2 10x2 fwd/lat  6 in: 10 x 2 fwd, lat   Leg press   Unable due to pain  --  -- 55 lb: 10 x   65 lb: 10 x   75 lb 10 x    SLS  5 sec x 10   5-7 sec hold 10 x    Next visit    TherAct         Patient education                                             Gait Training         hurdles  4 hurdles: 3 cycles fwd, lat --                        Modalities         CP 7  min ant and post knee  5 min    5 min

## 2023-11-07 ENCOUNTER — RA CDI HCC (OUTPATIENT)
Dept: OTHER | Facility: HOSPITAL | Age: 78
End: 2023-11-07

## 2023-11-08 ENCOUNTER — OFFICE VISIT (OUTPATIENT)
Dept: PHYSICAL THERAPY | Facility: CLINIC | Age: 78
End: 2023-11-08
Payer: MEDICARE

## 2023-11-08 DIAGNOSIS — Z96.651 S/P TKR (TOTAL KNEE REPLACEMENT), RIGHT: Primary | ICD-10-CM

## 2023-11-08 PROCEDURE — 97140 MANUAL THERAPY 1/> REGIONS: CPT | Performed by: PHYSICAL THERAPIST

## 2023-11-08 PROCEDURE — 97110 THERAPEUTIC EXERCISES: CPT | Performed by: PHYSICAL THERAPIST

## 2023-11-08 NOTE — PROGRESS NOTES
Daily Note         Today's date: 2023  Patient name: Anival Land  : 1945  MRN: [id number]  Referring provider: Abbi Torres DO  Dx:   Encounter Diagnosis     ICD-10-CM    1. S/P TKR (total knee replacement), right  Z96.651           Subjective: Anival Land reports she was sore after last session. States she felt she may overdo the leg press. Objective: See treatment diary below    Assessment:   patient contineus to experience pain on medial aspect of righ tknee with extension particularly with leg press as she presses into full extension . Paz Thomas Describes symptoms as a catching, type pain. Patient in previous sessions responded positively ot STM on medial aspect of knee and pain resolved, therefore avoided aggressive ROM and strengthening today and incorporated STM to reduce symptoms. . The goal of the current treatment is to address patient's functional limitations as well as objective findings. Plan:  progress as toelrated. Insurance:  AMA/CMS [employer] #/ Referral # Start date  Expiration date Extension  Visit limitation? PT only or  PT+OT?  Co-Insurance   10/12/23  medicare                                                                Past Medical History:   Diagnosis Date    Arthritis     left knee injection-    Disease of thyroid gland     hypothyroidism    DJD (degenerative joint disease)     GERD (gastroesophageal reflux disease)     Hypertension     Seasonal allergies     Wears glasses        Surgery date: 23 right TKR   Date 10/25/23 10/26/23 10/30/23 10/31/23 11//2/23 11/6/23 11/8/23   Visit Number 5 6 7 8 9 10 11    FOTO performed         Manual           100 deg     105 deg      Retrograde massage with leg elevated    Stm medial knee                         TherEx/NMR Supine: flexion ext  Performed flex,ext   performed Performed flex/ext    NuStep warmup NS: lv 3 10 min  NS: 5 min lv 3  NS: 10 min   NS 5 min  RB 5 min  Rec bike: 5 min  NS: 10 min lv 4  Lv 4 10 min    SLR    10 x 2 w/ emphasis on quad set   10 x 2 10 x 2  10 x 2    Quad set 5 sec x 10    --       LAQ 10 x 2  2 x 10   5 sec x 2 x 10   5" 2x10 5 sec 10 x 2  1.5 lb: 10 x 2  1.5 lb 10 x 2    SAQ     W/ half roll: 2 x 10   1.5 lb: 10 x 2  1.5 lb: 2 x 10     Heel slides  W/ SOS + slideboard 5 sec x 15  W/ peanut: 10 x   W/ hamstring curl: 10 x  W/ peanut: 10 x   W/ hamstring curl: 10 x  W/ peanut: 10 x   W/ hamstring curl: 10 x   20 x  W/ peanut: 10 x   W/ hamstring curl: 10 x    Knee flexion stretch on step 5 sec x 10   5 sec x 10   5 sec x10  5"x10 5 sec x 10   5 sec x 10   5 sec x 10     Amol heel raises 10 x 3  10 x 3 10 x 3  10x3 10 x 3 10 x 3  10 x 3    Mini squats holding onto bar 10 x 3 Sit/stands form chair: 10 x 2  TRX 10 x 2 TRX 2x10  TRX: 2 x 10 tap to standard chair  TRX: 2 x 10 tap to standard chair    Step ups 6 in :10 x 2  6 in: 10 x 3  Lv 2 10 x 2 forward, lateral  Lv 2 10x2 fwd/lat  6 in: 10 x 2 fwd, lat 6 in: 10 x 2 fwd, lat   Leg press   Unable due to pain  --  -- 55 lb: 10 x   65 lb: 10 x   75 lb 10 x  Hold    SLS  5 sec x 10   5-7 sec hold 10 x    Next visit  5 sec x 5   TherAct          Patient education                                                  Gait Training          hurdles  4 hurdles: 3 cycles fwd, lat --                           Modalities          CP 7  min ant and post knee  5 min    5 min

## 2023-11-09 ENCOUNTER — APPOINTMENT (OUTPATIENT)
Dept: LAB | Facility: HOSPITAL | Age: 78
End: 2023-11-09
Payer: MEDICARE

## 2023-11-09 DIAGNOSIS — Z01.818 PREOPERATIVE TESTING: ICD-10-CM

## 2023-11-09 LAB
ALBUMIN SERPL BCP-MCNC: 4.1 G/DL (ref 3.5–5)
ALP SERPL-CCNC: 76 U/L (ref 34–104)
ALT SERPL W P-5'-P-CCNC: 13 U/L (ref 7–52)
ANION GAP SERPL CALCULATED.3IONS-SCNC: 6 MMOL/L
AST SERPL W P-5'-P-CCNC: 16 U/L (ref 13–39)
BASOPHILS # BLD AUTO: 0.05 THOUSANDS/ÂΜL (ref 0–0.1)
BASOPHILS NFR BLD AUTO: 1 % (ref 0–1)
BILIRUB SERPL-MCNC: 0.47 MG/DL (ref 0.2–1)
BUN SERPL-MCNC: 20 MG/DL (ref 5–25)
CALCIUM SERPL-MCNC: 9 MG/DL (ref 8.4–10.2)
CHLORIDE SERPL-SCNC: 103 MMOL/L (ref 96–108)
CO2 SERPL-SCNC: 28 MMOL/L (ref 21–32)
CREAT SERPL-MCNC: 0.95 MG/DL (ref 0.6–1.3)
EOSINOPHIL # BLD AUTO: 0.17 THOUSAND/ÂΜL (ref 0–0.61)
EOSINOPHIL NFR BLD AUTO: 3 % (ref 0–6)
ERYTHROCYTE [DISTWIDTH] IN BLOOD BY AUTOMATED COUNT: 13.1 % (ref 11.6–15.1)
ERYTHROCYTE [SEDIMENTATION RATE] IN BLOOD: 4 MM/HOUR (ref 0–29)
GFR SERPL CREATININE-BSD FRML MDRD: 58 ML/MIN/1.73SQ M
GLUCOSE SERPL-MCNC: 94 MG/DL (ref 65–140)
HCT VFR BLD AUTO: 44.6 % (ref 34.8–46.1)
HGB BLD-MCNC: 14.7 G/DL (ref 11.5–15.4)
IMM GRANULOCYTES # BLD AUTO: 0.01 THOUSAND/UL (ref 0–0.2)
IMM GRANULOCYTES NFR BLD AUTO: 0 % (ref 0–2)
INR PPP: 0.91 (ref 0.84–1.19)
LYMPHOCYTES # BLD AUTO: 1.6 THOUSANDS/ÂΜL (ref 0.6–4.47)
LYMPHOCYTES NFR BLD AUTO: 27 % (ref 14–44)
MCH RBC QN AUTO: 31.7 PG (ref 26.8–34.3)
MCHC RBC AUTO-ENTMCNC: 33 G/DL (ref 31.4–37.4)
MCV RBC AUTO: 96 FL (ref 82–98)
MONOCYTES # BLD AUTO: 0.6 THOUSAND/ÂΜL (ref 0.17–1.22)
MONOCYTES NFR BLD AUTO: 10 % (ref 4–12)
NEUTROPHILS # BLD AUTO: 3.61 THOUSANDS/ÂΜL (ref 1.85–7.62)
NEUTS SEG NFR BLD AUTO: 59 % (ref 43–75)
NRBC BLD AUTO-RTO: 0 /100 WBCS
PLATELET # BLD AUTO: 285 THOUSANDS/UL (ref 149–390)
PMV BLD AUTO: 9.5 FL (ref 8.9–12.7)
POTASSIUM SERPL-SCNC: 4.5 MMOL/L (ref 3.5–5.3)
PROT SERPL-MCNC: 6.7 G/DL (ref 6.4–8.4)
PROTHROMBIN TIME: 12.4 SECONDS (ref 11.6–14.5)
RBC # BLD AUTO: 4.63 MILLION/UL (ref 3.81–5.12)
SODIUM SERPL-SCNC: 137 MMOL/L (ref 135–147)
WBC # BLD AUTO: 6.04 THOUSAND/UL (ref 4.31–10.16)

## 2023-11-09 PROCEDURE — 85610 PROTHROMBIN TIME: CPT

## 2023-11-09 PROCEDURE — 36415 COLL VENOUS BLD VENIPUNCTURE: CPT

## 2023-11-09 PROCEDURE — 80053 COMPREHEN METABOLIC PANEL: CPT

## 2023-11-09 PROCEDURE — 85025 COMPLETE CBC W/AUTO DIFF WBC: CPT

## 2023-11-09 PROCEDURE — 85652 RBC SED RATE AUTOMATED: CPT

## 2023-11-13 ENCOUNTER — OFFICE VISIT (OUTPATIENT)
Dept: FAMILY MEDICINE CLINIC | Facility: CLINIC | Age: 78
End: 2023-11-13
Payer: MEDICARE

## 2023-11-13 VITALS
DIASTOLIC BLOOD PRESSURE: 82 MMHG | HEART RATE: 72 BPM | SYSTOLIC BLOOD PRESSURE: 130 MMHG | WEIGHT: 184 LBS | OXYGEN SATURATION: 97 % | HEIGHT: 62 IN | BODY MASS INDEX: 33.86 KG/M2

## 2023-11-13 DIAGNOSIS — N18.31 STAGE 3A CHRONIC KIDNEY DISEASE (HCC): ICD-10-CM

## 2023-11-13 DIAGNOSIS — K21.9 GASTROESOPHAGEAL REFLUX DISEASE WITHOUT ESOPHAGITIS: ICD-10-CM

## 2023-11-13 DIAGNOSIS — M15.9 PRIMARY OSTEOARTHRITIS INVOLVING MULTIPLE JOINTS: Primary | ICD-10-CM

## 2023-11-13 DIAGNOSIS — E03.9 ACQUIRED HYPOTHYROIDISM: ICD-10-CM

## 2023-11-13 DIAGNOSIS — I10 PRIMARY HYPERTENSION: ICD-10-CM

## 2023-11-13 DIAGNOSIS — E66.09 CLASS 1 OBESITY DUE TO EXCESS CALORIES WITHOUT SERIOUS COMORBIDITY WITH BODY MASS INDEX (BMI) OF 33.0 TO 33.9 IN ADULT: ICD-10-CM

## 2023-11-13 PROCEDURE — 99214 OFFICE O/P EST MOD 30 MIN: CPT | Performed by: INTERNAL MEDICINE

## 2023-11-13 RX ORDER — ASPIRIN 81 MG/1
81 TABLET ORAL 2 TIMES DAILY
COMMUNITY
Start: 2023-09-25

## 2023-11-13 RX ORDER — CELECOXIB 200 MG/1
200 CAPSULE ORAL DAILY
COMMUNITY
Start: 2023-10-26 | End: 2023-12-25

## 2023-11-13 RX ORDER — B-COMPLEX WITH VITAMIN C
1 TABLET ORAL EVERY MORNING
COMMUNITY

## 2023-11-13 NOTE — ASSESSMENT & PLAN NOTE
Patient BMI is 33.65 slightly less compared to before again recommend patient to cut back on calorie intake lifestyle modification especially after she undergoes the second knee surgery she may be able to do more exercise and walking and lose weight.

## 2023-11-13 NOTE — ASSESSMENT & PLAN NOTE
Lab Results   Component Value Date    EGFR 58 11/09/2023    EGFR 56 08/29/2023    EGFR 59 06/29/2023    CREATININE 0.95 11/09/2023    CREATININE 0.97 08/29/2023    CREATININE 0.93 06/29/2023   Patient GFR is 58 with a creatinine of 0.95 we will continue to monitor with periodic lab work

## 2023-11-13 NOTE — PROGRESS NOTES
Name: Kulwinder Canseco      : 1945      MRN: [id number]  Encounter Provider: Teodora Sherman MD  Encounter Date: 2023   Encounter department: [address] [place] [address]     1. Primary osteoarthritis involving multiple joints  Assessment & Plan:  Patient with osteoarthritis of the knee joints underwent knee replacement robotic surgery on the right side now scheduled to have a repeat 1 on the left side on  meanwhile she will continue Celebrex 200 mg once daily. 2. Primary hypertension  Assessment & Plan:  Blood pressure today is 130/82 currently she is taking metoprolol tartrate 50 mg in the morning and half a tablet in the evening we will continue the same. 3. Gastroesophageal reflux disease without esophagitis  Assessment & Plan:  Patient with GERD Moncada's esophagus on omeprazole 20 mg daily but not able to go off of that medication when she misses the dose she could immediately tell the difference. 4. Class 1 obesity due to excess calories without serious comorbidity with body mass index (BMI) of 33.0 to 33.9 in adult  Assessment & Plan:  Patient BMI is 33.65 slightly less compared to before again recommend patient to cut back on calorie intake lifestyle modification especially after she undergoes the second knee surgery she may be able to do more exercise and walking and lose weight. 5. Acquired hypothyroidism  Assessment & Plan:  Continue levothyroxine 25 mcg daily follow-up with repeat TSH level at a later date.       6. Stage 3a chronic kidney disease ([address])  Assessment & Plan:  Lab Results   Component Value Date    EGFR 58 2023    EGFR 56 2023    EGFR 59 2023    CREATININE 0.95 2023    CREATININE 0.97 2023    CREATININE 0.93 2023   Patient GFR is 58 with a creatinine of 0.95 we will continue to monitor with periodic lab work             Subjective     Patient has coming here for a follow-up evaluation with regards to her osteoarthritis of both the knees. Patient underwent surgery of the right knee robotic surgery and is undergoing physical therapy she still has problems with flexion of the knee but getting better. Patient is scheduled for the operation for the left knee on . She had some preop lab work done including PT/INR chemistry and CBC unremarkable. Patient had panic attack in the prep room prior to the surgery when she learned about the spinal anesthesia but when she got the medication intravenously for sedation she felt all right. No chest pains palpitation shortness of breath. Review of Systems   Constitutional:  Negative for chills, fatigue and fever. HENT:  Negative for ear pain, sore throat and trouble swallowing. Eyes:  Negative for pain and visual disturbance. Respiratory:  Negative for cough and shortness of breath. Cardiovascular:  Negative for chest pain and palpitations. Gastrointestinal:  Negative for abdominal pain, constipation, diarrhea and vomiting. Genitourinary:  Negative for difficulty urinating, dysuria, flank pain and hematuria. Musculoskeletal:  Negative for arthralgias and back pain. Skin:  Negative for color change and rash. Neurological:  Negative for dizziness, seizures, syncope, light-headedness and headaches. Psychiatric/Behavioral:  Negative for agitation, confusion and hallucinations. All other systems reviewed and are negative.       Past Medical History:   Diagnosis Date    Arthritis     left knee injection-2017    Disease of thyroid gland     hypothyroidism    DJD (degenerative joint disease)     GERD (gastroesophageal reflux disease)     Hypertension     Seasonal allergies     Wears glasses      Past Surgical History:   Procedure Laterality Date    BREAST BIOPSY Left 2014    BREAST SURGERY Left     x2 bx-benign     SECTION  1966    x1    COLONOSCOPY      age 74    COLONOSCOPY N/A 3/6/2017    Procedure: COLONOSCOPY;  Surgeon: Anika Lewis MD;  Location: [facility]; Service:     ESOPHAGOGASTRODUODENOSCOPY N/A 3/6/2017    Procedure: ESOPHAGOGASTRODUODENOSCOPY (EGD); Surgeon: Ankia Lewis MD;  Location: [facility]; Service:     ESOPHAGOGASTRODUODENOSCOPY N/A 11/20/2017    Procedure: ESOPHAGOGASTRODUODENOSCOPY (EGD); Surgeon: Anika Lewis MD;  Location: [facility]; Service: Gastroenterology    HERNIA REPAIR      incisional hernia right side    HYSTERECTOMY  1966    partial- ovaries remain    KNEE ARTHROSCOPY Left     torn meniscus    LIPOMA RESECTION      lower right abdomen    [place] EGD TRANSORAL BIOPSY SINGLE/MULTIPLE N/A 6/18/2018    Procedure: ESOPHAGOGASTRODUODENOSCOPY (EGD); Surgeon: Anika Lewis MD;  Location: [facility]; Service: Gastroenterology    ROTATOR CUFF REPAIR Left     SHOULDER ARTHROSCOPY W/ ROTATOR CUFF REPAIR Left     TRIGGER FINGER RELEASE Right     thumb     Family History   Problem Relation Age of Onset    Hepatitis Brother         hep C from [facility]     Social History     Socioeconomic History    Marital status:      Spouse name: None    Number of children: None    Years of education: None    Highest education level: None   Occupational History    None   Tobacco Use    Smoking status: Never     Passive exposure: Never    Smokeless tobacco: Never   Substance and Sexual Activity    Alcohol use: No    Drug use: No    Sexual activity: None   Other Topics Concern    None   Social History Narrative    None     Social Determinants of Health     Financial Resource Strain: Low Risk  (7/7/2023)    Overall Financial Resource Strain (CARDIA)     Difficulty of Paying Living Expenses: Not hard at all   Food Insecurity: Not on file   Transportation Needs: No Transportation Needs (7/7/2023)    PRAPARE - Transportation     Lack of Transportation (Medical): No     Lack of Transportation (Non-Medical):  No   Physical Activity: Not on file   Stress: Not on file   Social Connections: Not on file   Intimate Partner Violence: Not on file   Housing Stability: Not on file     Current Outpatient Medications on File Prior to Visit   Medication Sig    aspirin (ECOTRIN LOW STRENGTH) 81 mg EC tablet Take 81 mg by mouth 2 (two) times a day    calcium carbonate-vitamin D 500 mg-5 mcg per tablet Take 1 tablet by mouth every morning    celecoxib (CeleBREX) 200 mg capsule Take 200 mg by mouth daily    levothyroxine 25 mcg tablet Take 1 tablet (25 mcg total) by mouth every morning    metoprolol tartrate (LOPRESSOR) 50 mg tablet Take half a tablet of the 50 mg in the morning and half a tablet of 50 mg in the evening    Multiple Vitamin (multivitamin) tablet Take 1 tablet by mouth daily    mupirocin (BACTROBAN) 2 % ointment APPLY OINTMENT TOPICALLY TO AFFECTED AREA TWICE DAILY. BEGIN 5 DAYS PRIOR TO SURGERY, APPY TO NOSTRILS    omeprazole (PriLOSEC) 20 mg delayed release capsule One tablet by mouth 1/2 hour before breakfast daily    [DISCONTINUED] Cholecalciferol (VITAMIN D PO) Take 5,000 Units by mouth once a week On Sunday     No Known Allergies  Immunization History   Administered Date(s) Administered    COVID-19 MODERNA VACC 0.5 ML IM 11/08/2021       Objective     /82   Pulse 72   Ht 5' 2" (1.575 m)   Wt 83.5 kg (184 lb)   SpO2 97%   BMI 33.65 kg/m²     Physical Exam  Vitals and nursing note reviewed. Constitutional:       Appearance: Normal appearance. She is obese. She is not ill-appearing. HENT:      Right Ear: External ear normal. There is no impacted cerumen. Left Ear: External ear normal. There is no impacted cerumen. Eyes:      General:         Right eye: No discharge. Left eye: No discharge. Conjunctiva/sclera: Conjunctivae normal.   Cardiovascular:      Rate and Rhythm: Normal rate and regular rhythm. Pulses: Normal pulses. Heart sounds: Normal heart sounds. Pulmonary:      Effort: Pulmonary effort is normal. No respiratory distress. Breath sounds: Normal breath sounds. No wheezing. Musculoskeletal:      Cervical back: Normal range of motion and neck supple. Right lower leg: Edema present. Left lower leg: No edema. Skin:     Coloration: Skin is not jaundiced or pale. Neurological:      General: No focal deficit present. Mental Status: She is alert and oriented to person, place, and time. Motor: No weakness. Gait: Gait normal.   Psychiatric:         Mood and Affect: Mood normal.         Behavior: Behavior normal.         Thought Content:  Thought content normal.         Judgment: Judgment normal.       Corrie Simental MD

## 2023-11-13 NOTE — ASSESSMENT & PLAN NOTE
Patient with GERD Moncada's esophagus on omeprazole 20 mg daily but not able to go off of that medication when she misses the dose she could immediately tell the difference.

## 2023-11-13 NOTE — ASSESSMENT & PLAN NOTE
Patient with osteoarthritis of the knee joints underwent knee replacement robotic surgery on the right side now scheduled to have a repeat 1 on the left side on December 7 meanwhile she will continue Celebrex 200 mg once daily.

## 2023-11-13 NOTE — ASSESSMENT & PLAN NOTE
Blood pressure today is 130/82 currently she is taking metoprolol tartrate 50 mg in the morning and half a tablet in the evening we will continue the same.

## 2023-11-14 ENCOUNTER — OFFICE VISIT (OUTPATIENT)
Dept: PHYSICAL THERAPY | Facility: CLINIC | Age: 78
End: 2023-11-14
Payer: MEDICARE

## 2023-11-14 DIAGNOSIS — Z96.651 S/P TKR (TOTAL KNEE REPLACEMENT), RIGHT: Primary | ICD-10-CM

## 2023-11-14 PROCEDURE — 97110 THERAPEUTIC EXERCISES: CPT | Performed by: PHYSICAL THERAPIST

## 2023-11-14 NOTE — PROGRESS NOTES
Daily Note         Today's date: 2023  Patient name: Barrie Ivy  : 1945  MRN: [id number]  Referring provider: Arlene Coleman DO  Dx:   Encounter Diagnosis     ICD-10-CM    1. S/P TKR (total knee replacement), right  Z96.651           Subjective: Barrie Ivy reports she no longer has the pain she did last few sessions in right knee       Objective: See treatment diary below    Assessment:   needed cuing to perform stair climbing reciprocally. Demonstrated step to pattern as she has done habitually. Introduced step downs this session  was able to progress form 4 inch stair to 6 in stair. Needed dean UE support to assist with lowering. Held leg press this session due to increased knee pain with leg press after last 1-2 sessions. .          Plan:  progress ROM, strength and stability        Insurance:  [employer]/CMS Isabela Tony #/ Referral # Start date  Expiration date Extension  Visit limitation? PT only or  PT+OT?  Co-Insurance   10/12/23  medicare                                                                Past Medical History:   Diagnosis Date    Arthritis     left knee injection-    Disease of thyroid gland     hypothyroidism    DJD (degenerative joint disease)     GERD (gastroesophageal reflux disease)     Hypertension     Seasonal allergies     Wears glasses        Surgery date: 23 right TKR   Date 10/30/23 10/31/23 11//2/23 11/6/23 11/8/23 11/14/23   Visit Number 7 8 9 10 11 12            Manual             105 deg         Stm medial knee                        TherEx/NMR Performed flex,ext   performed Performed flex/ext  Performed    NuStep warmup NS: 10 min   NS 5 min  RB 5 min  Rec bike: 5 min  NS: 10 min lv 4  Lv 4 10 min  Lv 4 8 min    SLR  10 x 2 w/ emphasis on quad set   10 x 2 10 x 2  10 x 2  10 x 2   Quad set --        LAQ 5 sec x 2 x 10   5" 2x10 5 sec 10 x 2  1.5 lb: 10 x 2  1.5 lb 10 x 2     SAQ   W/ half roll: 2 x 10   1.5 lb: 10 x 2  1.5 lb: 2 x 10      Heel slides  W/ peanut: 10 x   W/ hamstring curl: 10 x  W/ peanut: 10 x   W/ hamstring curl: 10 x   20 x  W/ peanut: 10 x   W/ hamstring curl: 10 x     Knee flexion stretch on step 5 sec x10  5"x10 5 sec x 10   5 sec x 10   5 sec x 10   5 sec x 10     Amol heel raises 10 x 3  10x3 10 x 3 10 x 3  10 x 3  + half roll: 10 x2    prostretch      10 sec x 5     Mini squats holding onto bar TRX 10 x 2 TRX 2x10  TRX: 2 x 10 tap to standard chair  TRX: 2 x 10 tap to standard chair  TRX: 2 x 10 tap to standard chair    Step ups Lv 2 10 x 2 forward, lateral  Lv 2 10x2 fwd/lat  6 in: 10 x 2 fwd, lat 6 in: 10 x 2 fwd, lat Lat 6 in: 10 x 2   Fwd:    Leg press  --  -- 55 lb: 10 x   65 lb: 10 x   75 lb 10 x  Hold  --   SLS 5-7 sec hold 10 x    Next visit  5 sec x 5 5 sec x 10   Step downs      6 in:    TKE's      CC: 2.5 lb 5 sec x10    TherAct         Patient education                                             Gait Training      Reciprical stair negotitation:    hurdles --                          Modalities         CP 5 min    5 min

## 2023-11-16 ENCOUNTER — OFFICE VISIT (OUTPATIENT)
Dept: PHYSICAL THERAPY | Facility: CLINIC | Age: 78
End: 2023-11-16
Payer: MEDICARE

## 2023-11-16 DIAGNOSIS — Z96.651 S/P TKR (TOTAL KNEE REPLACEMENT), RIGHT: Primary | ICD-10-CM

## 2023-11-16 PROCEDURE — 97110 THERAPEUTIC EXERCISES: CPT | Performed by: PHYSICAL THERAPIST

## 2023-11-16 PROCEDURE — 97140 MANUAL THERAPY 1/> REGIONS: CPT | Performed by: PHYSICAL THERAPIST

## 2023-11-16 NOTE — PROGRESS NOTES
Daily Note         Today's date: 2023  Patient name: Susie Jovel  : 1945  MRN: [id number]  Referring provider: Cheryl Chen DO  Dx:   Encounter Diagnosis     ICD-10-CM    1. S/P TKR (total knee replacement), right  Z96.651           Subjective: Susie Jovel reports she still has tenderness and swelling in left knee. Continues to state she is nervous about having the other knee done and hopes she is ready       Objective: See treatment diary below    Assessment:   patient continues to slowly progress with her strengthening. Still needs UE support with balance activities. Patient continues to have swelling in right knee which responded positively to retrograde massage. Patient still has mild gait deviations while ambulating in clinic with reduced stance time on right and reduced terminal knee extension on right. Plan:  progress as tolerated. Insurance:  [employer]/CMS Justin Hirsch #/ Referral # Start date  Expiration date Extension  Visit limitation? PT only or  PT+OT?  Co-Insurance   10/12/23  medicare                                                                Past Medical History:   Diagnosis Date    Arthritis     left knee injection-    Disease of thyroid gland     hypothyroidism    DJD (degenerative joint disease)     GERD (gastroesophageal reflux disease)     Hypertension     Seasonal allergies     Wears glasses        Surgery date: 23 right TKR                                                       reeval: 23  Date 23   Visit Number 9 10 11 12 13           Manual          105 deg        Stm medial knee    Retrograde massage right knee                   TherEx/NMR  performed Performed flex/ext  Performed  Performed    NuStep warmup  Rec bike: 5 min  NS: 10 min lv 4  Lv 4 10 min  Lv 4 8 min  Lv 4 10 min    SLR  10 x 2 10 x 2  10 x 2  10 x 2 10 x 2   Quad set        LAQ 5 sec 10 x 2  1.5 lb: 10 x 2  1.5 lb 10 x 2 SAQ W/ half roll: 2 x 10   1.5 lb: 10 x 2  1.5 lb: 2 x 10       Heel slides   20 x  W/ peanut: 10 x   W/ hamstring curl: 10 x      Knee flexion stretch on step 5 sec x 10   5 sec x 10   5 sec x 10   5 sec x 10   5 sec x 15    Amol heel raises 10 x 3 10 x 3  10 x 3  + half roll: 10 x2     prostretch    10 sec x 5      Mini squats holding onto bar  TRX: 2 x 10 tap to standard chair  TRX: 2 x 10 tap to standard chair  TRX: 2 x 10 tap to standard chair  TRX: 2 x 10 tap to standard chair    Step ups  6 in: 10 x 2 fwd, lat 6 in: 10 x 2 fwd, lat Lat 6 in: 10 x 2   Fwd:  Fwd/lat: 10 x 2 6 in    Leg press  -- 55 lb: 10 x   65 lb: 10 x   75 lb 10 x  Hold  -- --   SLS  Next visit  5 sec x 5 5 sec x 10 5 sec x 10     Step downs    6 in:  6 in: 10 x 2    TKE's    CC: 2.5 lb 5 sec x10  CC; 2.5 lb: 2 x 10     TherAct        Patient education                                        Gait Training    Reciprical stair negotitation:     hurdles                        Modalities        CP  5 min

## 2023-11-21 ENCOUNTER — OFFICE VISIT (OUTPATIENT)
Dept: PHYSICAL THERAPY | Facility: CLINIC | Age: 78
End: 2023-11-21
Payer: MEDICARE

## 2023-11-21 DIAGNOSIS — Z96.651 S/P TKR (TOTAL KNEE REPLACEMENT), RIGHT: Primary | ICD-10-CM

## 2023-11-21 PROCEDURE — 97110 THERAPEUTIC EXERCISES: CPT | Performed by: PHYSICAL THERAPIST

## 2023-11-21 NOTE — PROGRESS NOTES
Daily Note         Today's date: 2023  Patient name: Reynaldo Lord  : 1945  MRN: [id number]  Referring provider: Shai Tellez DO  Dx:   Encounter Diagnosis     ICD-10-CM    1. S/P TKR (total knee replacement), right  Z96.651           Subjective: Reynaldo Lord reports she is "behaving herself" states she is still unable to descend stairs forward. Objective: See treatment diary below    Assessment:   patient demonstrated much improvement with step ups at 6 in stair. No pain with step ups. Patient trialed 8 in step but compensated too much with dean UE . Patient demonstrated increased ease descending stairs but requires UE support to control lowering. So still progressing. Gait deviations increase with increased activity due to fatigue and soreness. Plan: Progress treatment as tolerated. Insurance:  [employer]/CMS Emmalene Promise #/ Referral # Start date  Expiration date Extension  Visit limitation? PT only or  PT+OT?  Co-Insurance   10/12/23  medicare                                                                Past Medical History:   Diagnosis Date    Arthritis     left knee injection-    Disease of thyroid gland     hypothyroidism    DJD (degenerative joint disease)     GERD (gastroesophageal reflux disease)     Hypertension     Seasonal allergies     Wears glasses        Surgery date: 23 right TKR                                                       reeval: 23  Date 23   Visit Number 9 10 11 12 13 14            Manual           105 deg         Stm medial knee    Retrograde massage right knee                      TherEx/NMR  performed Performed flex/ext  Performed  Performed  Performed    NuStep warmup  Rec bike: 5 min  NS: 10 min lv 4  Lv 4 10 min  Lv 4 8 min  Lv 4 10 min  Lv 4 7 min    SLR  10 x 2 10 x 2  10 x 2  10 x 2 10 x 2 10 x 3   Quad set         LAQ 5 sec 10 x 2  1.5 lb: 10 x 2  1.5 lb 10 x 2 SAQ W/ half roll: 2 x 10   1.5 lb: 10 x 2  1.5 lb: 2 x 10        Heel slides   20 x  W/ peanut: 10 x   W/ hamstring curl: 10 x    W/ SOS: 15 x 5 sec   Knee flexion stretch on step 5 sec x 10   5 sec x 10   5 sec x 10   5 sec x 10   5 sec x 15  5 sec x 10    Amol heel raises 10 x 3 10 x 3  10 x 3  + half roll: 10 x2      prostretch    10 sec x 5       Mini squats holding onto bar  TRX: 2 x 10 tap to standard chair  TRX: 2 x 10 tap to standard chair  TRX: 2 x 10 tap to standard chair  TRX: 2 x 10 tap to standard chair  TRX: 2 x 10 tap to standard chair    Step ups  6 in: 10 x 2 fwd, lat 6 in: 10 x 2 fwd, lat Lat 6 in: 10 x 2   Fwd:  Fwd/lat: 10 x 2 6 in  Fwd/lat: 10 x 3 6 in   Leg press  -- 55 lb: 10 x   65 lb: 10 x   75 lb 10 x  Hold  -- --    SLS  Next visit  5 sec x 5 5 sec x 10 5 sec x 10      Step downs    6 in:  6 in: 10 x 2  6 in: 10 x 2    TKE's    CC: 2.5 lb 5 sec x10  CC; 2.5 lb: 2 x 10   CC: 5.5 5 sec x 10    Prone quad stretch      W/ SOS: 10 sec x 5     Heel toe ambulation       8 feet x 3 cycles   TherAct         Patient education                                             Gait Training    Reciprical stair negotitation:      hurdles                           Modalities         CP  5 min

## 2023-11-24 ENCOUNTER — OFFICE VISIT (OUTPATIENT)
Dept: PHYSICAL THERAPY | Facility: CLINIC | Age: 78
End: 2023-11-24
Payer: MEDICARE

## 2023-11-24 DIAGNOSIS — Z96.651 S/P TKR (TOTAL KNEE REPLACEMENT), RIGHT: Primary | ICD-10-CM

## 2023-11-24 PROCEDURE — 97110 THERAPEUTIC EXERCISES: CPT | Performed by: PHYSICAL THERAPIST

## 2023-11-24 NOTE — PROGRESS NOTES
Daily Note         Today's date: 2023  Patient name: Lakhwinder Cruz  : 1945  MRN: [id number]  Referring provider: Danny Clarke DO  Dx:   Encounter Diagnosis     ICD-10-CM    1. S/P TKR (total knee replacement), right  Z96.651           Subjective: Lakhwinder Cruz reports no new complaints entering today       Objective: See treatment diary below    Assessment:   patient was able to reintroduce the leg press this session without an issue with increase in pain . Patient describes being anxious about her upcoming surgery. Still unsure about her plan after surgery in regards to rehab . The goal of the current treatment is to address patient's functional limitations as well as objective findings. Plan:  progressing towards d.c due to upcoming surgery. Insurance:  [employer]/CMS Jane Donovan #/ Referral # Start date  Expiration date Extension  Visit limitation? PT only or  PT+OT?  Co-Insurance   10/12/23  medicare                                                                Past Medical History:   Diagnosis Date    Arthritis     left knee injection-    Disease of thyroid gland     hypothyroidism    DJD (degenerative joint disease)     GERD (gastroesophageal reflux disease)     Hypertension     Seasonal allergies     Wears glasses        Surgery date: 23 right TKR                                                       reeval: 23  Date 23   Visit Number 9 10 11 12 13 14 15             Manual            105 deg          Stm medial knee    Retrograde massage right knee                         TherEx/NMR  performed Performed flex/ext  Performed  Performed  Performed  Performed flexion in sitting   NuStep warmup  Rec bike: 5 min  NS: 10 min lv 4  Lv 4 10 min  Lv 4 8 min  Lv 4 10 min  Lv 4 7 min  Upright bike: 6 min lv 1   SLR  10 x 2 10 x 2  10 x 2  10 x 2 10 x 2 10 x 3 --   Quad set       --   LAQ 5 sec 10 x 2  1.5 lb: 10 x 2  1.5 lb 10 x 2     1.5 lb 10 x 2    SAQ W/ half roll: 2 x 10   1.5 lb: 10 x 2  1.5 lb: 2 x 10      --   Heel slides   20 x  W/ peanut: 10 x   W/ hamstring curl: 10 x    W/ SOS: 15 x 5 sec --   Knee flexion stretch on step 5 sec x 10   5 sec x 10   5 sec x 10   5 sec x 10   5 sec x 15  5 sec x 10  5 sec x 15   Amol heel raises 10 x 3 10 x 3  10 x 3  + half roll: 10 x2       prostretch    10 sec x 5        Mini squats holding onto bar  TRX: 2 x 10 tap to standard chair  TRX: 2 x 10 tap to standard chair  TRX: 2 x 10 tap to standard chair  TRX: 2 x 10 tap to standard chair  TRX: 2 x 10 tap to standard chair  TRX: 2 x 10 tap to standard chair    Step ups  6 in: 10 x 2 fwd, lat 6 in: 10 x 2 fwd, lat Lat 6 in: 10 x 2   Fwd:  Fwd/lat: 10 x 2 6 in  Fwd/lat: 10 x 3 6 in Fwd/lat: 10 x 3 6 in   Leg press  -- 55 lb: 10 x   65 lb: 10 x   75 lb 10 x  Hold  -- --  45 lb: 10 x 2    SLS  Next visit  5 sec x 5 5 sec x 10 5 sec x 10    5 sec x 10     Step downs    6 in:  6 in: 10 x 2  6 in: 10 x 2  6 in: 10 x 2    TKE's    CC: 2.5 lb 5 sec x10  CC; 2.5 lb: 2 x 10   CC: 5.5 5 sec x 10  CC: 5.5 lb 5 sec x 10     Prone quad stretch      W/ SOS: 10 sec x 5   --   Heel toe ambulation       8 feet x 3 cycles 16 feet x 3 cycles   Ankle DF       10 x 2 supported   TherAct          Patient education                                                  Gait Training    Reciprical stair negotitation:       hurdles                              Modalities          CP  5 min

## 2023-11-28 ENCOUNTER — OFFICE VISIT (OUTPATIENT)
Dept: PHYSICAL THERAPY | Facility: CLINIC | Age: 78
End: 2023-11-28
Payer: MEDICARE

## 2023-11-28 DIAGNOSIS — Z96.651 S/P TKR (TOTAL KNEE REPLACEMENT), RIGHT: Primary | ICD-10-CM

## 2023-11-28 PROCEDURE — 97110 THERAPEUTIC EXERCISES: CPT | Performed by: PHYSICAL THERAPIST

## 2023-11-28 NOTE — PROGRESS NOTES
Daily Note         Today's date: 2023  Patient name: Lindsey Giles  : 1945  MRN: [id number]  Referring provider: Holly Silver DO  Dx:   Encounter Diagnosis     ICD-10-CM    1. S/P TKR (total knee replacement), right  Z96.651           Subjective: Lindsey Giles reports she is having surgery for her left TKR a week form Thursday and will be transferring care to another facility closer to where she will be staying. States she has been sleeping really well. States she wants to continue rehabbing right after her left TKR. States she is still having difficulty with negotiating stairs because her whole foot is not on the step. Objective: See treatment diary below    Assessment:   patient only needed minimal cuing for proper form  . Patient will transition to HEP next session due to preparing for left TKR next week. Overall good tolerance to all exercises. Still mild deviations present. Swelling sitl noted right knee    Plan:  reeval and d/c next session        Insurance:  [place]/ Referral # Start date  Expiration date Extension  Visit limitation? PT only or  PT+OT?  Co-Insurance   10/12/23  medicare                                                                Past Medical History:   Diagnosis Date    Arthritis     left knee injection-2017    Disease of thyroid gland     hypothyroidism    DJD (degenerative joint disease)     GERD (gastroesophageal reflux disease)     Hypertension     Seasonal allergies     Wears glasses        Surgery date: 23 right TKR                                                       reeval: 23  Date 23   Visit Number [id number] 13 14 15 16            Manual                     Retrograde massage right knee   Retrograde massage right knee                     TherEx/NMR Performed flex/ext  Performed  Performed  Performed  Performed flexion in sitting Performed in sitting    NuStep warmup Lv 4 10 min  Lv 4 8 min  Lv 4 10 min  Lv 4 7 min  Upright bike: 6 min lv 1 NS: lv 4 7 min    SLR  10 x 2  10 x 2 10 x 2 10 x 3 --    Quad set     --    LAQ 1.5 lb 10 x 2     1.5 lb 10 x 2  1.5 lb 10 x 3   SAQ 1.5 lb: 2 x 10      --    Heel slides  W/ peanut: 10 x   W/ hamstring curl: 10 x    W/ SOS: 15 x 5 sec --    Knee flexion stretch on step 5 sec x 10   5 sec x 10   5 sec x 15  5 sec x 10  5 sec x 15 5 sec x 15    Amol heel raises 10 x 3  + half roll: 10 x2     + half roll: 10 x 2    prostretch  10 sec x 5      10 sec x 5     Mini squats holding onto bar TRX: 2 x 10 tap to standard chair  TRX: 2 x 10 tap to standard chair  TRX: 2 x 10 tap to standard chair  TRX: 2 x 10 tap to standard chair  TRX: 2 x 10 tap to standard chair  Standard squat to chair + foam: 10 x 2    Step ups 6 in: 10 x 2 fwd, lat Lat 6 in: 10 x 2   Fwd:  Fwd/lat: 10 x 2 6 in  Fwd/lat: 10 x 3 6 in Fwd/lat: 10 x 3 6 in Fwd/lat: 6 in 10 x 2   Leg press  Hold  -- --  45 lb: 10 x 2  45 lb 10 x 3   SLS 5 sec x 5 5 sec x 10 5 sec x 10    5 sec x 10   + 3 way hip: 5 x each side    Step downs  6 in:  6 in: 10 x 2  6 in: 10 x 2  6 in: 10 x 2  6 in 10 x 2   TKE's  CC: 2.5 lb 5 sec x10  CC; 2.5 lb: 2 x 10   CC: 5.5 5 sec x 10  CC: 5.5 lb 5 sec x 10   CC: 5.5 lb: 5 sec x 10     Prone quad stretch    W/ SOS: 10 sec x 5   --    Tandem ambulation     8 feet x 3 cycles 16 feet x 3 cycles 8 feet x 3 cycles      Ankle DF     10 x 2 supported 10 x 2 supported   TherAct         Patient education                                             Gait Training  Reciprical stair negotitation:        hurdles                           Modalities         CP

## 2023-11-30 ENCOUNTER — OFFICE VISIT (OUTPATIENT)
Dept: PHYSICAL THERAPY | Facility: CLINIC | Age: 78
End: 2023-11-30
Payer: MEDICARE

## 2023-11-30 DIAGNOSIS — Z96.651 S/P TKR (TOTAL KNEE REPLACEMENT), RIGHT: Primary | ICD-10-CM

## 2023-11-30 PROCEDURE — 97110 THERAPEUTIC EXERCISES: CPT | Performed by: PHYSICAL THERAPIST

## 2023-11-30 PROCEDURE — 97530 THERAPEUTIC ACTIVITIES: CPT | Performed by: PHYSICAL THERAPIST

## 2023-11-30 NOTE — PROGRESS NOTES
Daily Note/Progress Note      Today's date: 2023  Patient name: Elsy Zhang  : 1945  MRN: [id number]  Referring provider: Jennie Cruz DO  Dx:   Encounter Diagnosis     ICD-10-CM    1. S/P TKR (total knee replacement), right  Z96.651           Subjective: Pt reports % improvement since SOC: 95%. The last 5% is due to lack of strength and "the nerves are still jumping"  Pain level at rest:  3 /10, pain level with ADLS:  . At this time, the functional limitations include: states she has to be conscious of her walk to stop limping"  still taking 2 Tylenol in am and at night. Pain when she sits too long. Objective: See treatment diary below    Goals (23)(23)  Short Term Goals (4 weeks):    - Patient will be independent in basic HEP 2-3 weeks met   - Patient will demonstrate knee ROM knee flexion/extension to 110 deg/0 deg for ease with gait. Not met   - Patient will report 40% improvement in symptoms NT ( met)  - Patient will demonstrate >1/3 improvement in MMT grade as applicable met     Long Term Goals (8 weeks):  - Patient will be independent in a comprehensive home exercise program still progressing ( met)  - Patient FOTO score will improve by 10 points. (Met)  - Patient will independently ambulate >1000 feet (community ambulation) with no assistive device not met  (met)  - Patient will self-report >70% improvement in function NT (met)  - Patient functional goal to negotiate stairs reciprocally not met consistently (status quo)   - Patient functional goal to return to driving not met  (met)      Subjective    Social Support  - Steps to enter house: 2  - Stairs in house: flight   - Bedroom/bathroom set up: second floor  - Lives with: alone   -recreational actvities: cares for her home, part time lunch  at school.       Objective  (23) (23)    LE MMT  L Hip Flexion: 4/5  R Hip Flexion: 3-/5(4-/5)(4/5)   L Hip Extension: 4/5 R Hip Extension: 4/5   L Hip Abduction: 4/5 R Hip Abduction: 4/5   L Hip Adduction: 4/5 R Hip Adduction: 4/5   L Knee Extension: 4+/5 R Knee Extension: 3+/5 (4-/5)(4/5)   L Knee Flexion: 4+/5 R Knee Flexion: 4-/ (4/5)   L Ankle DF: 4+/5 R Ankle DF: 4+/5   L Ankle PF: 4+/5  R Ankle PF: 4+/5     LE ROM    L knee flexion: wnl degrees R knee flexion: 85 degrees AAROM (105)    L knee extension: -8 degrees  R knee extension: -4 degrees (status quo) (-3 , 0 PROM)       Sensation  - Light touch: exquisitely tender to palpation on medial  (status quo)     Knee Comments  - Functional squat: needs UE assistance to transfer sit/stand (status quo)     Gait: amb with SPC: reduced stance time on right, with reduced knee extension during swing, (household distances no assistive device, reduced knee extension on right during gait, particularly in stance and terminal swing)    Inspection: + compression stocking to thigh. ( Not utilizing any more)    Assessment: Agnieszka Camejo demonstrates improvement in strength and function. She continues to be limited in knee flexion and ext ROM despite being addressed consistently in there ex program. Due to these limitations her gait deviations are still present. Patient demonstrates a good understanding of what needs to be addressed in HEP in regards to right knee. The goal status of Angelica Morocho is indicated above . Plan:  d/c to [facility] as patient will be having surgery next Thursday for left TKR        Insurance:  [place] [facility] #/ Referral # Start date  Expiration date Extension  Visit limitation? PT only or  PT+OT?  Co-Insurance   10/12/23  medicare                                                                Past Medical History:   Diagnosis Date    Arthritis     left knee injection-2017    Disease of thyroid gland     hypothyroidism    DJD (degenerative joint disease)     GERD (gastroesophageal reflux disease)     Hypertension     Seasonal allergies     Wears glasses        Surgery date: 9/25/23 right TKR reeval: 12/6/23  Date 11/16/23 11/21/23 11/24/23 11/28/23 11/30/23   Visit Number 13 14 15 16 17        105   Manual     Foto performed        79/62    Retrograde massage right knee   Retrograde massage right knee                    TherEx/NMR Performed  Performed  Performed flexion in sitting Performed in sitting  Performed supine flex and ext   NuStep warmup Lv 4 10 min  Lv 4 7 min  Upright bike: 6 min lv 1 NS: lv 4 7 min  Rec bike: 6 min    SLR  10 x 2 10 x 3 --     Quad set   --     LAQ   1.5 lb 10 x 2  1.5 lb 10 x 3    SAQ   --     Heel slides   W/ SOS: 15 x 5 sec --     Knee flexion stretch on step 5 sec x 15  5 sec x 10  5 sec x 15 5 sec x 15  5 sec x 10   Amol heel raises    + half roll: 10 x 2  + half roll: 10 x 2    prostretch    10 sec x 5   10 sec x 5     Mini squats holding onto bar TRX: 2 x 10 tap to standard chair  TRX: 2 x 10 tap to standard chair  TRX: 2 x 10 tap to standard chair  Standard squat to chair + foam: 10 x 2     Step ups Fwd/lat: 10 x 2 6 in  Fwd/lat: 10 x 3 6 in Fwd/lat: 10 x 3 6 in Fwd/lat: 6 in 10 x 2 6 in 10 x 2 fwd/lat   Leg press  --  45 lb: 10 x 2  45 lb 10 x 3 45 lb: 10 x 3    SLS 5 sec x 10    5 sec x 10   + 3 way hip: 5 x each side  + 3 way hip: 5 x each side    Step downs 6 in: 10 x 2  6 in: 10 x 2  6 in: 10 x 2  6 in 10 x 2 6 in: 10 x 2   TKE's CC; 2.5 lb: 2 x 10   CC: 5.5 5 sec x 10  CC: 5.5 lb 5 sec x 10   CC: 5.5 lb: 5 sec x 10   CC: 5.5 5 sec x 10     Prone quad stretch  W/ SOS: 10 sec x 5   --     Tandem ambulation   8 feet x 3 cycles 16 feet x 3 cycles 8 feet x 3 cycles    8 feet x 5 cycles   Ankle DF   10 x 2 supported 10 x 2 supported 10 x 2   TherAct        Patient education             Reeval performed                            Gait Training        hurdles                        Modalities        CP

## 2023-12-11 ENCOUNTER — EVALUATION (OUTPATIENT)
Dept: PHYSICAL THERAPY | Facility: CLINIC | Age: 78
End: 2023-12-11
Payer: MEDICARE

## 2023-12-11 DIAGNOSIS — Z96.651 S/P TKR (TOTAL KNEE REPLACEMENT), RIGHT: Primary | ICD-10-CM

## 2023-12-11 DIAGNOSIS — Z96.652 S/P TKR (TOTAL KNEE REPLACEMENT), LEFT: ICD-10-CM

## 2023-12-11 PROCEDURE — 97110 THERAPEUTIC EXERCISES: CPT | Performed by: PHYSICAL THERAPIST

## 2023-12-11 PROCEDURE — 97163 PT EVAL HIGH COMPLEX 45 MIN: CPT | Performed by: PHYSICAL THERAPIST

## 2023-12-11 NOTE — LETTER
2023    Mu Sal, [address] [facility]  [address]  [address] [address]    Patient: Ally Matthew   YOB: 1945   Date of Visit: 2023     Encounter Diagnosis     ICD-10-CM    1. S/P TKR (total knee replacement), right  Z96.651       2. S/P TKR (total knee replacement), left  L52.820           Dear Dr. Charyl Dancer: Thank you for your recent referral of Ally Matthew. Please review the attached evaluation summary from Agnieszka's recent visit. Please verify that you agree with the plan of care by signing the attached order. If you have any questions or concerns, please do not hesitate to call. I sincerely appreciate the opportunity to share in the care of one of your patients and hope to have another opportunity to work with you in the near future. Sincerely,    Lilly Grier, PT      Referring Provider:      I certify that I have read the below Plan of Care and certify the need for these services furnished under this plan of treatment while under my care. Mu Sal DO   [facility]  [address]  [address] [address]  Via Fax: [phone]          PT Evaluation     Today's date: 2023  Patient name: Ally Matthew  : 1945  MRN: [id number]  Referring provider: Mu Sal DO  Dx:   Encounter Diagnosis     ICD-10-CM    1. S/P TKR (total knee replacement), right  Z96.651       2. S/P TKR (total knee replacement), left  Z96.652           Start Time: 1445  Stop Time: 1540  Total time in clinic (min): 55 minutes    Assessment  Assessment details: Ally Matthew is a 70 y.o. female who presents with h/o chronic bilateral knee pain. Patient is currently s/p bilateral total knee arthroplasty (R DOS:  ; L DOS: ). Examination findings demonstrate joint effusion/edema, decreased quadriceps tone, decreased knee ROM, decreased LE strength and antalgic gait.  Patient has been educated in post-op contraindications / precautions, wound care, bas and basic HEP. Patient would benefit from skilled physical therapy to address their aforementioned impairments, improve their level of function and to improve their overall quality of life. Impairments: abnormal gait, abnormal muscle firing, abnormal muscle tone, abnormal or restricted ROM, activity intolerance, impaired balance, impaired physical strength, lacks appropriate home exercise program, pain with function, safety issue, weight-bearing intolerance and poor body mechanics  Understanding of Dx/Px/POC: excellent   Prognosis: good    Goals  Short Term Goals: to be achieved by 4 weeks  1) Patient to be independent with basic HEP. 2) Decrease pain by 5/10 at its worst.  3) Increase knee flexion ROM to 100 deg. 4) Increase knee extension ROM by > 5 deg. 5) Increase LE strength by 1/2 MMT grade in all deficient planes. 6) Patient to negotiate steps with a step-to pattern with use of HR. 7) Patient to report decreased sleep interruption secondary to pain. 8) Increase ambulatory tolerance by 20 min with LRAD. Long Term Goals: to be achieved by discharge  1) FOTO equal to or greater than TBD. 2) Patient to be independent with comprehensive HEP. 3) Abolish pain for improved quality of life. 4) Increase LE strength to 5/5 MMT grade in all planes to improve a/iadls. 5) Achieve full knee extension ROM to improve a/iadls. 6) Increase knee flexion ROM to within 5 deg of contralateral LE to improve a/iadls. 7) Patient to negotiate steps with a reciprocal pattern with use of Hr. 8) Increase ambulatory tolerance to 60 min to improve participation in social activities. 9) Patient to report no sleep interruption secondary to pain.       Plan  Patient would benefit from: skilled PT  Planned modality interventions: biofeedback, cryotherapy, electrical stimulation/Russian stimulation, TENS and low level laser therapy  Planned therapy interventions: activity modification, ADL retraining, ADL training, balance, balance/weight bearing training, body mechanics training, dressing changes, functional ROM exercises, gait training, home exercise program, IADL retraining, joint mobilization, manual therapy, massage, neuromuscular re-education, patient education, self care, strengthening, stretching, therapeutic activities, therapeutic exercise and transfer training  Frequency: 2-3x week. Duration in weeks: 12  Plan of Care beginning date: 2023  Plan of Care expiration date: 3/4/2024  Treatment plan discussed with: patient      Subjective Evaluation    History of Present Illness  Mechanism of injury: Patient presents with h/o chronic bilateral knee pain. Patient received steroid injections, which provided minimal, temporary relief. Patient was able to perform all a/iadls, but had significant pain. Patient underwent a right TKA arthroplasty on 23 and participated in PT. Patient continues to have intermittent pain, limited ROM and weakness. Patient has opted to undergo a L TKA on . Patient's pain is controlled with her prescription medication. Patient's sleep is disrupted Patient is currently living with her ex- and anticipates moving back into her home in the new year. Patient's next scheduled appointment with her surgeon is in early January. Patient lives alone in a 2-story home. Patient has 1 step without a hand rail to enter her home. Patient's first floor has 1 step to access her living room. Patient's kitchen is on her 1st floor. Patient's 2nd floor has her primary bedroom, full bathroom, and laundry room. Patient has 2 steps to enter her bathroom/laundry room.    Patient Goals  Patient goal: decreased pain, improved activity tolerance, improve quality of gait  Pain  Current pain ratin  At best pain ratin  At worst pain rating: 10  Location: left > right knee: diffuse  Quality: sharp and dull ache    Social Support    Employment status: not working  Treatments  Current treatment: physical therapy      Objective     Observations   Left Knee   Positive for edema, effusion and incision (Clean, well-approximated, no signs of infection ; post-operative bandaging intact). Right Knee   Positive for edema, effusion and incision (good scar tissue mobility). Active Range of Motion   Left Knee   Flexion: 49 degrees with pain  Extension: -10 degrees     Right Knee   Flexion: 80 degrees with pain  Extension: -7 degrees     Passive Range of Motion   Left Knee   Flexion: 72 degrees   Extension: -5 degrees     Right Knee   Flexion: 84 degrees with pain  Extension: -5 degrees with pain    Mobility   Patellar Mobility:   Left Knee   Hypomobile: left medial, left lateral, left superior and left inferior    Right Knee   Hypomobile: medial, lateral, superior and inferior     Strength/Myotome Testing     Left Hip   Planes of Motion   Flexion: 2+    Right Hip   Planes of Motion   Flexion: 4-    Left Knee   Flexion: 3-  Extension: 3-  Quadriceps contraction: poor    Right Knee   Flexion: 4-  Extension: 5  Quadriceps contraction: fair    Left Ankle/Foot   Dorsiflexion: 5    Right Ankle/Foot   Dorsiflexion: 5    Tests   Left Ankle/Foot   Negative for Homans' sign. Right Ankle/Foot   Negative for Julee Troup' sign.      Ambulation   Weight-Bearing Status   Weight-Bearing Status (Left): weight-bearing as tolerated   Weight-Bearing Status (Right): weight-bearing as tolerated    Assistive device used: two-wheeled walker    Ambulation: Level Surfaces   Ambulation with assistive device: independent    Observational Gait   Gait: antalgic     Functional Assessment        Comments  Timed Up and Go (TUG): 45.10 sec, Mod I ambulation with RW and sit to/from stand with b/l arm rests  Five Times Sit to Stand Test: 37.40 sec, Mod I with b/l arm rests, good eccentric control, equal weightbearing               POC expires Unit limit Auth  expiration date PT/OT + Visit Limit?   3/4/24 N/A N/A BOMN                 Visit/Unit Tracking  AUTH Status:  Date 12/11              N/A Used 1               Remaining                    Precautions: s/p L TKA (DOS: 12/7/23), R TKA (DOS: 9/25/23)      Manuals 12/11            B/L knee PROM             Gr. II-IV pat mobs                                       Neuro Re-Ed             SAQ             TKE             Supine SLR             Quad set with heel prop                                                    Ther Ex             Patient education: pathophysiology, edema management, HEP review GR            Recumbent bike             HR             SB calf str.              LAQ             Standing h/s curl             Heel slides                          Ther Activity             FSU             FSD             Squats                          Gait Training                                       Modalities

## 2023-12-11 NOTE — PROGRESS NOTES
PT Evaluation     Today's date: 2023  Patient name: Gladis Martinez  : 1945  MRN: [id number]  Referring provider: Johanna Hinds DO  Dx:   Encounter Diagnosis     ICD-10-CM    1. S/P TKR (total knee replacement), right  Z96.651       2. S/P TKR (total knee replacement), left  Z96.652           Start Time: 1445  Stop Time: 1540  Total time in clinic (min): 55 minutes    Assessment  Assessment details: Gladis Martinez is a 70 y.o. female who presents with h/o chronic bilateral knee pain. Patient is currently s/p bilateral total knee arthroplasty (R DOS:  ; L DOS: ). Examination findings demonstrate joint effusion/edema, decreased quadriceps tone, decreased knee ROM, decreased LE strength and antalgic gait. Patient has been educated in post-op contraindications / precautions, wound care, bas and basic HEP. Patient would benefit from skilled physical therapy to address their aforementioned impairments, improve their level of function and to improve their overall quality of life. Impairments: abnormal gait, abnormal muscle firing, abnormal muscle tone, abnormal or restricted ROM, activity intolerance, impaired balance, impaired physical strength, lacks appropriate home exercise program, pain with function, safety issue, weight-bearing intolerance and poor body mechanics  Understanding of Dx/Px/POC: excellent   Prognosis: good    Goals  Short Term Goals: to be achieved by 4 weeks  1) Patient to be independent with basic HEP. 2) Decrease pain by 5/10 at its worst.  3) Increase knee flexion ROM to 100 deg. 4) Increase knee extension ROM by > 5 deg. 5) Increase LE strength by 1/2 MMT grade in all deficient planes. 6) Patient to negotiate steps with a step-to pattern with use of HR. 7) Patient to report decreased sleep interruption secondary to pain. 8) Increase ambulatory tolerance by 20 min with LRAD. Long Term Goals: to be achieved by discharge  1) FOTO equal to or greater than TBD.   2) Patient to be independent with comprehensive HEP. 3) Abolish pain for improved quality of life. 4) Increase LE strength to 5/5 MMT grade in all planes to improve a/iadls. 5) Achieve full knee extension ROM to improve a/iadls. 6) Increase knee flexion ROM to within 5 deg of contralateral LE to improve a/iadls. 7) Patient to negotiate steps with a reciprocal pattern with use of Hr. 8) Increase ambulatory tolerance to 60 min to improve participation in social activities. 9) Patient to report no sleep interruption secondary to pain. Plan  Patient would benefit from: skilled PT  Planned modality interventions: biofeedback, cryotherapy, electrical stimulation/Russian stimulation, TENS and low level laser therapy  Planned therapy interventions: activity modification, ADL retraining, ADL training, balance, balance/weight bearing training, body mechanics training, dressing changes, functional ROM exercises, gait training, home exercise program, IADL retraining, joint mobilization, manual therapy, massage, neuromuscular re-education, patient education, self care, strengthening, stretching, therapeutic activities, therapeutic exercise and transfer training  Frequency: 2-3x week. Duration in weeks: 12  Plan of Care beginning date: 12/11/2023  Plan of Care expiration date: 3/4/2024  Treatment plan discussed with: patient      Subjective Evaluation    History of Present Illness  Mechanism of injury: Patient presents with h/o chronic bilateral knee pain. Patient received steroid injections, which provided minimal, temporary relief. Patient was able to perform all a/iadls, but had significant pain. Patient underwent a right TKA arthroplasty on 9/25/23 and participated in PT. Patient continues to have intermittent pain, limited ROM and weakness. Patient has opted to undergo a L TKA on 12/7. Patient's pain is controlled with her prescription medication.  Patient's sleep is disrupted Patient is currently living with her ex- and anticipates moving back into her home in the new year. Patient's next scheduled appointment with her surgeon is in early January. Patient lives alone in a 2-story home. Patient has 1 step without a hand rail to enter her home. Patient's first floor has 1 step to access her living room. Patient's kitchen is on her 1st floor. Patient's 2nd floor has her primary bedroom, full bathroom, and laundry room. Patient has 2 steps to enter her bathroom/laundry room. Patient Goals  Patient goal: decreased pain, improved activity tolerance, improve quality of gait  Pain  Current pain ratin  At best pain ratin  At worst pain rating: 10  Location: left > right knee: diffuse  Quality: sharp and dull ache    Social Support    Employment status: not working  Treatments  Current treatment: physical therapy      Objective     Observations   Left Knee   Positive for edema, effusion and incision (Clean, well-approximated, no signs of infection ; post-operative bandaging intact). Right Knee   Positive for edema, effusion and incision (good scar tissue mobility).      Active Range of Motion   Left Knee   Flexion: 49 degrees with pain  Extension: -10 degrees     Right Knee   Flexion: 80 degrees with pain  Extension: -7 degrees     Passive Range of Motion   Left Knee   Flexion: 72 degrees   Extension: -5 degrees     Right Knee   Flexion: 84 degrees with pain  Extension: -5 degrees with pain    Mobility   Patellar Mobility:   Left Knee   Hypomobile: left medial, left lateral, left superior and left inferior    Right Knee   Hypomobile: medial, lateral, superior and inferior     Strength/Myotome Testing     Left Hip   Planes of Motion   Flexion: 2+    Right Hip   Planes of Motion   Flexion: 4-    Left Knee   Flexion: 3-  Extension: 3-  Quadriceps contraction: poor    Right Knee   Flexion: 4-  Extension: 5  Quadriceps contraction: fair    Left Ankle/Foot   Dorsiflexion: 5    Right Ankle/Foot   Dorsiflexion: 5    Tests   Left Ankle/Foot   Negative for Homans' sign. Right Ankle/Foot   Negative for Curry Dys' sign. Ambulation   Weight-Bearing Status   Weight-Bearing Status (Left): weight-bearing as tolerated   Weight-Bearing Status (Right): weight-bearing as tolerated    Assistive device used: two-wheeled walker    Ambulation: Level Surfaces   Ambulation with assistive device: independent    Observational Gait   Gait: antalgic     Functional Assessment        Comments  Timed Up and Go (TUG): 45.10 sec, Mod I ambulation with RW and sit to/from stand with b/l arm rests  Five Times Sit to Stand Test: 37.40 sec, Mod I with b/l arm rests, good eccentric control, equal weightbearing               POC expires Unit limit Auth  expiration date PT/OT + Visit Limit?   3/4/24 N/A N/A BOMN                 Visit/Unit Tracking  AUTH Status:  Date 12/11              N/A Used 1               Remaining                    Precautions: s/p L TKA (DOS: 12/7/23), R TKA (DOS: 9/25/23)      Manuals 12/11            B/L knee PROM             Gr. II-IV pat mobs                                       Neuro Re-Ed             SAQ             TKE             Supine SLR             Quad set with heel prop                                                    Ther Ex             Patient education: pathophysiology, edema management, HEP review GR            Recumbent bike             HR             SB calf str.              LAQ             Standing h/s curl             Heel slides                          Ther Activity             FSU             FSD             Squats                          Gait Training                                       Modalities

## 2023-12-15 ENCOUNTER — APPOINTMENT (OUTPATIENT)
Dept: PHYSICAL THERAPY | Facility: CLINIC | Age: 78
End: 2023-12-15
Payer: MEDICARE

## 2023-12-15 DIAGNOSIS — Z96.651 S/P TKR (TOTAL KNEE REPLACEMENT), RIGHT: Primary | ICD-10-CM

## 2023-12-15 DIAGNOSIS — Z96.652 S/P TKR (TOTAL KNEE REPLACEMENT), LEFT: ICD-10-CM

## 2023-12-15 NOTE — PROGRESS NOTES
Daily Note     Today's date: 12/15/2023  Patient name: Agnieszka Camejo  : 1945  MRN: [id number]  Referring provider: Isak Breen DO  Dx:   Encounter Diagnosis     ICD-10-CM    1. S/P TKR (total knee replacement), right  Z96.651       2. S/P TKR (total knee replacement), left  Z96.652                      Subjective: ***      Objective: See treatment diary below      Assessment: Tolerated treatment fair. Patient demonstrated fatigue post treatment and would benefit from continued PT.       Plan: Continue per plan of care.  Progress treatment as tolerated.       POC expires Unit limit Auth  expiration date PT/OT + Visit Limit?   3/4/24 N/A N/A BOMN                 Visit/Unit Tracking  AUTH Status:  Date 12/11 12/15             N/A Used 1 2              Remaining                    Precautions: s/p L TKA (DOS: 23), R TKA (DOS: 23)      Manuals 12/11 12/15           B/L knee PROM             Gr. II-IV pat mobs                                       Neuro Re-Ed             SAQ             TKE             Supine SLR             Quad set with heel prop                                                    Ther Ex             Patient education: pathophysiology, edema management, HEP review GR            Recumbent bike             HR             SB calf str.             LAQ             Standing h/s curl             Heel slides                          Ther Activity             FSU             FSD             Squats                          Gait Training                                       Modalities

## 2023-12-18 ENCOUNTER — OFFICE VISIT (OUTPATIENT)
Dept: PHYSICAL THERAPY | Facility: CLINIC | Age: 78
End: 2023-12-18
Payer: MEDICARE

## 2023-12-18 DIAGNOSIS — Z96.651 S/P TKR (TOTAL KNEE REPLACEMENT), RIGHT: Primary | ICD-10-CM

## 2023-12-18 DIAGNOSIS — Z96.652 S/P TKR (TOTAL KNEE REPLACEMENT), LEFT: ICD-10-CM

## 2023-12-18 PROCEDURE — 97530 THERAPEUTIC ACTIVITIES: CPT | Performed by: PHYSICAL THERAPIST

## 2023-12-18 PROCEDURE — 97112 NEUROMUSCULAR REEDUCATION: CPT | Performed by: PHYSICAL THERAPIST

## 2023-12-18 PROCEDURE — 97110 THERAPEUTIC EXERCISES: CPT | Performed by: PHYSICAL THERAPIST

## 2023-12-18 NOTE — PROGRESS NOTES
"Daily Note     Today's date: 2023  Patient name: Agnieszka Camejo  : 1945  MRN: [id number]  Referring provider: Isak Breen DO  Dx:   Encounter Diagnosis     ICD-10-CM    1. S/P TKR (total knee replacement), right  Z96.651       2. S/P TKR (total knee replacement), left  Z96.652           Start Time: 1430  Stop Time: 1550  Total time in clinic (min): 80 minutes    Subjective: Patient reports that she is just getting over being sick. Patient's knees remain painful. Patient has not been performing her HEP.      Objective: See treatment diary below      Assessment: Tolerated treatment fair. Patient demonstrated fatigue post treatment and would benefit from continued PT. Patient with fair tolerance for self stretching into knee flexion and extension. Deferred manual stretching secondary to Patient's elevated pain. Educated Patient on importance of future stretching and HEP compliance to maximize function.      Plan: Continue per plan of care.  Progress treatment as tolerated.       POC expires Unit limit Auth  expiration date PT/OT + Visit Limit?   3/4/24 N/A N/A BOMN                 Visit/Unit Tracking  AUTH Status:  Date              N/A Used 1 2              Remaining                    Precautions: s/p L TKA (DOS: 23), R TKA (DOS: 23)      Manuals            B/L knee PROM             Gr. II-IV pat mobs                                       Neuro Re-Ed             SAQ  L: 2x10 5\"           TKE             Supine SLR             Quad set with heel prop  L: x10 5\"  R: 20x5\"                                                  Ther Ex             Patient education: pathophysiology, edema management, HEP review GR            Recumbent bike  Nustep L3 10 min           HR  2x10           SB calf str.  3x30\"           LAQ  R: 2x10 3#           Standing h/s curl             Heel slides  10x10\" b/l                        Ther Activity             FSU  R: 6\" 2x10  L: 4\" 2x10        "    FSD             Squats  2x10                        Gait Training                                       Modalities

## 2023-12-22 ENCOUNTER — OFFICE VISIT (OUTPATIENT)
Dept: PHYSICAL THERAPY | Facility: CLINIC | Age: 78
End: 2023-12-22
Payer: MEDICARE

## 2023-12-22 DIAGNOSIS — Z96.651 S/P TKR (TOTAL KNEE REPLACEMENT), RIGHT: Primary | ICD-10-CM

## 2023-12-22 DIAGNOSIS — Z96.652 S/P TKR (TOTAL KNEE REPLACEMENT), LEFT: ICD-10-CM

## 2023-12-22 PROCEDURE — 97112 NEUROMUSCULAR REEDUCATION: CPT

## 2023-12-22 PROCEDURE — 97530 THERAPEUTIC ACTIVITIES: CPT

## 2023-12-22 PROCEDURE — 97110 THERAPEUTIC EXERCISES: CPT

## 2023-12-22 NOTE — PROGRESS NOTES
"Daily Note     Today's date: 2023  Patient name: Agnieszka Camejo  : 1945  MRN: [id number]  Referring provider: Isak Breen DO  Dx:   Encounter Diagnosis     ICD-10-CM    1. S/P TKR (total knee replacement), right  Z96.651       2. S/P TKR (total knee replacement), left  Z96.652           Start Time: 1050  Stop Time: 1156  Total time in clinic (min): 66 minutes    Subjective: Pt reports her L knee is very stiff but has been doing well.  Has been able to sleep roughy 4-5 hrs.  Is starting to worry she will not be able to get more mobility (specifically flexion) out of her R knee.  Denies any soreness after LV.        Objective: See treatment diary below      Assessment: Tolerated treatment well. Continued with program as outlined below.  Visible limitations in ROM and strength in both knees, L worse than R.  Most discomfort with self op into AAROM flex during heel slides of both knees.  Heavy reliance on BUE and handrail initially for FSU with RLE, but began to improve with additional reps and confidence increased.  Patient would benefit from continued PT to further improve strength, increase ROM, and maximize overall function.        Plan: Continue per plan of care.      POC expires Unit limit Auth  expiration date PT/OT + Visit Limit?   3/4/24 N/A N/A BOMN                 Visit/Unit Tracking  AUTH Status:  Date             N/A Used 1 2 3             Remaining                    Precautions: s/p L TKA (DOS: 23), R TKA (DOS: 23)      Manuals           B/L knee PROM             Gr. II-IV pat mobs                                       Neuro Re-Ed             SAQ  L: 2x10 5\" L: 2x10 5\"          TKE             Supine SLR             Quad set with heel prop  L: x10 5\"  R: 20x5\" L: x10 5\"  R: 20x5\"                                                 Ther Ex             Patient education: pathophysiology, edema management, HEP review GR            Recumbent bike  " "Nustep L3 10 min Nustep L3 10 min          HR  2x10 2x10          SB calf str.  3x30\" 3x30\"          LAQ  R: 2x10 3# R: 2x10 3#          Standing h/s curl             Heel slides  10x10\" b/l 10x10\" b/l                       Ther Activity             FSU  R: 6\" 2x10  L: 4\" 2x10 R: 6\" 2x10  L: 4\" 2x10          FSD             Squats  2x10 2x10                       Gait Training                                       Modalities                                                "

## 2023-12-26 ENCOUNTER — OFFICE VISIT (OUTPATIENT)
Dept: PHYSICAL THERAPY | Facility: CLINIC | Age: 78
End: 2023-12-26
Payer: MEDICARE

## 2023-12-26 DIAGNOSIS — Z96.651 S/P TKR (TOTAL KNEE REPLACEMENT), RIGHT: Primary | ICD-10-CM

## 2023-12-26 DIAGNOSIS — Z96.652 S/P TKR (TOTAL KNEE REPLACEMENT), LEFT: ICD-10-CM

## 2023-12-26 PROCEDURE — 97530 THERAPEUTIC ACTIVITIES: CPT | Performed by: PHYSICAL THERAPIST

## 2023-12-26 PROCEDURE — 97140 MANUAL THERAPY 1/> REGIONS: CPT | Performed by: PHYSICAL THERAPIST

## 2023-12-26 PROCEDURE — 97110 THERAPEUTIC EXERCISES: CPT | Performed by: PHYSICAL THERAPIST

## 2023-12-26 NOTE — PROGRESS NOTES
"Daily Note     Today's date: 2023  Patient name: Agnieszka Camejo  : 1945  MRN: [id number]  Referring provider: Isak Breen DO  Dx:   Encounter Diagnosis     ICD-10-CM    1. S/P TKR (total knee replacement), right  Z96.651       2. S/P TKR (total knee replacement), left  Z96.652           Start Time: 09  Stop Time: 1030  Total time in clinic (min): 62 minutes    Subjective: Patient reports that her right knee has been feeling better, but the left remains stiff.      Objective: See treatment diary below      Assessment: Tolerated treatment fair. Patient demonstrated fatigue post treatment and would benefit from continued PT. Patient challenged with performing forward step down on L LE with excessive reliance on Ues. Patient with limited knee flexion ROM bilaterally.      Plan: Continue per plan of care.  Progress treatment as tolerated.       POC expires Unit limit Auth  expiration date PT/OT + Visit Limit?   3/4/24 N/A N/A BOMN                 Visit/Unit Tracking  AUTH Status:  Date            N/A Used 1 2 3 4            Remaining                    Precautions: s/p L TKA (DOS: 23), R TKA (DOS: 23)      Manuals          B/L knee PROM    GR         Gr. II-IV pat mobs    GR         B/L calf, h/s str.    GR                      Neuro Re-Ed             SAQ  L: 2x10 5\" L: 2x10 5\"          TKE             Supine SLR             Quad set with heel prop  L: x10 5\"  R: 20x5\" L: x10 5\"  R: 20x5\"                                                 Ther Ex             Patient education: pathophysiology, edema management, HEP review GR            Recumbent bike  Nustep L3 10 min Nustep L3 10 min Nustep L3 10 min         HR  2x10 2x10          SB calf str.  3x30\" 3x30\"          LAQ  R: 2x10 3# R: 2x10 3#          Standing h/s curl             Heel slides  10x10\" b/l 10x10\" b/l                       Ther Activity             FSU  R: 6\" 2x10  L: 4\" 2x10 R: 6\" " "2x10  L: 4\" 2x10 6\" x10 b/l         FSD    4\" x10 R only         Squats  2x10 2x10 2x10                      Gait Training                                       Modalities                                                  "

## 2023-12-28 ENCOUNTER — OFFICE VISIT (OUTPATIENT)
Dept: PHYSICAL THERAPY | Facility: CLINIC | Age: 78
End: 2023-12-28
Payer: MEDICARE

## 2023-12-28 DIAGNOSIS — Z96.651 S/P TKR (TOTAL KNEE REPLACEMENT), RIGHT: Primary | ICD-10-CM

## 2023-12-28 DIAGNOSIS — Z96.652 S/P TKR (TOTAL KNEE REPLACEMENT), LEFT: ICD-10-CM

## 2023-12-28 PROCEDURE — 97110 THERAPEUTIC EXERCISES: CPT | Performed by: PHYSICAL THERAPIST

## 2023-12-28 PROCEDURE — 97140 MANUAL THERAPY 1/> REGIONS: CPT | Performed by: PHYSICAL THERAPIST

## 2023-12-28 PROCEDURE — 97530 THERAPEUTIC ACTIVITIES: CPT | Performed by: PHYSICAL THERAPIST

## 2023-12-28 NOTE — PROGRESS NOTES
"Daily Note     Today's date: 2023  Patient name: Agnieszka Camejo  : 1945  MRN: [id number]  Referring provider: Isak Breen DO  Dx:   Encounter Diagnosis     ICD-10-CM    1. S/P TKR (total knee replacement), right  Z96.651       2. S/P TKR (total knee replacement), left  Z96.652           Start Time: 1145  Stop Time: 1255  Total time in clinic (min): 70 minutes    Subjective: Patient reports that she felt better following her previous treatment session and slept good. Patient was able to visit her home and go up/down the stairs.      Objective: See treatment diary below      Assessment: Tolerated treatment fair. Patient demonstrated fatigue post treatment and would benefit from continued PT. Patient with gradually improving ROM bilaterally and weightbearing tolerance. Patient able to descend 4\" steps with L LE today, but does compensate with trunk rotation and contralateral pelvic drop.      Plan: Continue per plan of care.  Progress treatment as tolerated.       POC expires Unit limit Auth  expiration date PT/OT + Visit Limit?   3/4/24 N/A N/A BOMN                 Visit/Unit Tracking  AUTH Status:  Date           N/A Used 1 2 3 4 5           Remaining                    Precautions: s/p L TKA (DOS: 23), R TKA (DOS: 23)      Manuals         B/L knee PROM    GR GR        Gr. II-IV pat mobs    GR GR        B/L calf, h/s str.    GR GR                     Neuro Re-Ed             SAQ  L: 2x10 5\" L: 2x10 5\"          TKE             Supine SLR     2x10 b/l        Quad set with heel prop  L: x10 5\"  R: 20x5\" L: x10 5\"  R: 20x5\"                                                 Ther Ex             Patient education: pathophysiology, edema management, HEP review GR            Recumbent bike  Nustep L3 10 min Nustep L3 10 min Nustep L3 10 min Nustep 10 min        HR  2x10 2x10          SB calf str.  3x30\" 3x30\"  3x30\"        LAQ  R: 2x10 3# R: " "2x10 3#  2# 20x b/l        Standing h/s curl             Heel slides  10x10\" b/l 10x10\" b/l                       Ther Activity             FSU  R: 6\" 2x10  L: 4\" 2x10 R: 6\" 2x10  L: 4\" 2x10 6\" x10 b/l         FSD    4\" x10 R only 4\" x15 ea.        Squats  2x10 2x10 2x10         Total gym: bilateral squats     L22 x20        Gait Training                                       Modalities                                                    "

## 2024-01-03 ENCOUNTER — OFFICE VISIT (OUTPATIENT)
Dept: PHYSICAL THERAPY | Facility: CLINIC | Age: 79
End: 2024-01-03
Payer: MEDICARE

## 2024-01-03 DIAGNOSIS — Z96.652 S/P TKR (TOTAL KNEE REPLACEMENT), LEFT: ICD-10-CM

## 2024-01-03 DIAGNOSIS — Z96.651 S/P TKR (TOTAL KNEE REPLACEMENT), RIGHT: Primary | ICD-10-CM

## 2024-01-03 PROCEDURE — 97530 THERAPEUTIC ACTIVITIES: CPT

## 2024-01-03 PROCEDURE — 97140 MANUAL THERAPY 1/> REGIONS: CPT

## 2024-01-03 PROCEDURE — 97110 THERAPEUTIC EXERCISES: CPT

## 2024-01-03 PROCEDURE — 97112 NEUROMUSCULAR REEDUCATION: CPT

## 2024-01-03 NOTE — PROGRESS NOTES
Daily Note     Today's date: 1/3/2024  Patient name: Agnieszka Camejo  : 1945  MRN: [id number]  Referring provider: Isak Breen DO  Dx:   Encounter Diagnosis     ICD-10-CM    1. S/P TKR (total knee replacement), right  Z96.651       2. S/P TKR (total knee replacement), left  Z96.652           Start Time: 09  Stop Time: 1110  Total time in clinic (min): 76 minutes    Subjective: Pt noted that she has been hurting and noted having increase stiffness today since she did not have PT for 6 days in a row.   Noted that she does have some soreness in her L calf    Noted that her next follow up with ortho is 1/3/24.       Objective: See treatment diary below      Assessment:  Continued with treatment session with  some progressions. Tolerated treatment well. Progressions made this visit with step height of R FSD. As well as additional HS curls in standing added this visit.   Challenged with L knee flexion AROM as well as PROM this visit. Patient exhibited good technique with therapeutic exercises and would benefit from continued PT    Education reviewed with patient with verbal agreement and understanding.   Advised patient on DVT signs and symptoms to be aware of. No tenderness noted in L calf with palpation as of this date.    (IF DVT symptoms instructed patient to call dr and go immediately to the ER)   - HEP compliance noted at this time.   - DOMS 24 to 48 hours of muscle soreness s/p PT sessions.     Plan: Continue per plan of care.      POC expires Unit limit Auth  expiration date PT/OT + Visit Limit?   3/4/24 N/A 23 BOMN                 Visit/Unit Tracking  AUTH Status:  Date 1/3              N/A Used 1               Remaining                    Precautions: s/p L TKA (DOS: 23), R TKA (DOS: 23)      Manuals 12/11 12/18 12/22 12/26 12/28 1/3       B/L knee PROM    GR GR SC       Gr. II-IV pat mobs    GR GR        B/L calf, h/s str.    GR GR SC                    Neuro Re-Ed             SAQ  " L: 2x10 5\" L: 2x10 5\"          TKE             Supine SLR     2x10 b/l 2x 10 b/l        Quad set with heel prop  L: x10 5\"  R: 20x5\" L: x10 5\"  R: 20x5\"                                                 Ther Ex             Patient education: pathophysiology, edema management, HEP review GR            Recumbent bike  Nustep L3 10 min Nustep L3 10 min Nustep L3 10 min Nustep 10 min Nustep 10 min        HR  2x10 2x10          SB calf str.  3x30\" 3x30\"  3x30\" 30\" x 3        LAQ  R: 2x10 3# R: 2x10 3#  2# 20x b/l 2# 20x b/l        Standing h/s curl      10x  b/l        Heel slides  10x10\" b/l 10x10\" b/l                       Ther Activity             FSU  R: 6\" 2x10  L: 4\" 2x10 R: 6\" 2x10  L: 4\" 2x10 6\" x10 b/l         FSD    4\" x10 R only 4\" x15 ea. 4\" 10x  L   6\" 10x R       Squats  2x10 2x10 2x10         Total gym: bilateral squats     L22 x20 L22 2x 10        Gait Training                                       Modalities             CP       As needed declined today                                    "

## 2024-01-05 ENCOUNTER — OFFICE VISIT (OUTPATIENT)
Dept: PHYSICAL THERAPY | Facility: CLINIC | Age: 79
End: 2024-01-05
Payer: MEDICARE

## 2024-01-05 DIAGNOSIS — Z96.652 S/P TKR (TOTAL KNEE REPLACEMENT), LEFT: ICD-10-CM

## 2024-01-05 DIAGNOSIS — Z96.651 S/P TKR (TOTAL KNEE REPLACEMENT), RIGHT: Primary | ICD-10-CM

## 2024-01-05 PROCEDURE — 97110 THERAPEUTIC EXERCISES: CPT | Performed by: PHYSICAL THERAPIST

## 2024-01-05 PROCEDURE — 97530 THERAPEUTIC ACTIVITIES: CPT | Performed by: PHYSICAL THERAPIST

## 2024-01-05 PROCEDURE — 97140 MANUAL THERAPY 1/> REGIONS: CPT | Performed by: PHYSICAL THERAPIST

## 2024-01-05 NOTE — PROGRESS NOTES
"Daily Note     Today's date: 2024  Patient name: Agnieszka Camejo  : 1945  MRN: [id number]  Referring provider: Isak Breen DO  Dx:   Encounter Diagnosis     ICD-10-CM    1. S/P TKR (total knee replacement), right  Z96.651       2. S/P TKR (total knee replacement), left  Z96.652           Start Time: 1022  Stop Time: 1115  Total time in clinic (min): 53 minutes    Subjective: Patient reports that she had her post-operative bandaging removed and her knee looks great. Patient reports that she has been exercising at home, but not stretching her knee.      Objective: See treatment diary below      Assessment: Tolerated treatment fair. Patient demonstrated fatigue post treatment and would benefit from continued PT. Reviewed HEP stretches and stressed importance of continuing knee flexion/extension ROM to prevent excessive stiffening of knee. Patient with significant pain to light touch and gentle overpressure.      Plan: Continue per plan of care.  Progress treatment as tolerated.       POC expires Unit limit Auth  expiration date PT/OT + Visit Limit?   3/4/24 N/A 23 BOMN                 Visit/Unit Tracking  AUTH Status:  Date 1/3 1/5             N/A Used 1 2              Remaining                    Precautions: s/p L TKA (DOS: 23), R TKA (DOS: 23)      Manuals 12/11 12/18 12/22 12/26 12/28 1/3 1/5      B/L knee PROM    GR GR SC GR      Gr. II-IV pat mobs    GR GR        B/L calf, h/s str.    GR GR SC                    Neuro Re-Ed             SAQ  L: 2x10 5\" L: 2x10 5\"          TKE             Supine SLR     2x10 b/l 2x 10 b/l  2x10 b/l      Quad set with heel prop  L: x10 5\"  R: 20x5\" L: x10 5\"  R: 20x5\"                                                 Ther Ex             Patient education: pathophysiology, edema management, HEP review GR            Recumbent bike  Nustep L3 10 min Nustep L3 10 min Nustep L3 10 min Nustep 10 min Nustep 10 min  Nustep L5 10 min      HR  2x10 2x10        " "  SB calf str.  3x30\" 3x30\"  3x30\" 30\" x 3        LAQ  R: 2x10 3# R: 2x10 3#  2# 20x b/l 2# 20x b/l        Standing h/s curl      10x  b/l        Heel slides  10x10\" b/l 10x10\" b/l                       Ther Activity             FSU  R: 6\" 2x10  L: 4\" 2x10 R: 6\" 2x10  L: 4\" 2x10 6\" x10 b/l   R: 6\" x15  L: 4\" x15      FSD    4\" x10 R only 4\" x15 ea. 4\" 10x  L   6\" 10x R R: 6\" x15  L: 4\" x5      Squats  2x10 2x10 2x10         Total gym: bilateral squats     L22 x20 L22 2x 10  L22 2x10      Gait Training                                       Modalities             CP       As needed declined today                                      "

## 2024-01-08 NOTE — PROGRESS NOTES
Daily Note     Today's date: 2024  Patient name: Agnieszka Camejo  : 1945  MRN: [id number]  Referring provider: Isak Breen DO  Dx:   Encounter Diagnosis     ICD-10-CM    1. S/P TKR (total knee replacement), right  Z96.651       2. S/P TKR (total knee replacement), left  Z96.652           Start Time: 1530  Stop Time: 1625  Total time in clinic (min): 55 minutes    Subjective: Patient reports more of a daily compliance to HEP, especially stretches. Patient states she overdid it with knee flexion stretch on step as she had a moderate increase of pain symptoms. Patient notes that pain wasn't able to be managed with ice/elevation so she had taken an Oxycodone which had resolved symptoms.       Objective: See treatment diary below      Assessment: Patient tolerated treatment session well. Continued following prescribed POC without incident. Patient's strength appears to be progressing as she was able to tolerate an increase in reps without difficulty. Patient required HHA x 2 with fwd step ups/downs 2* feeling unsteady and weakness. Performed trial with HHA x 1 however, patient was unable to properly ascend without bilat UE use. Encouraged patient to continue to be consistent with HEP as mobility deficits are still evident within RLE>LLE, especially knee flexion. Patient appropriately fatigued by conclusion of visit and would benefit from continued stretching/strengthening to return to PLOF.       Plan: Continue per POC. Increase reps/resistance as tolerated.      POC expires Unit limit Auth  expiration date PT/OT + Visit Limit?   3/4/24 N/A 23 BOMN                 Visit/Unit Tracking  AUTH Status:  Date 1/3 1/5 1/9            N/A Used 1 2 3             Remaining                    Precautions: s/p L TKA (DOS: 23), R TKA (DOS: 23)      Manuals 12/11 12/18 12/22 12/26 12/28 1/3 1/5 1/9     B/L knee PROM    GR GR SC GR MS     Gr. II-IV pat mobs    GR GR        B/L calf, h/s str.    GR GR SC   "                  Neuro Re-Ed             SAQ  L: 2x10 5\" L: 2x10 5\"          TKE             Supine SLR     2x10 b/l 2x 10 b/l  2x10 b/l 2x10 b/l     Quad set with heel prop  L: x10 5\"  R: 20x5\" L: x10 5\"  R: 20x5\"                                                 Ther Ex             Patient education: pathophysiology, edema management, HEP review GR            Recumbent bike  Nustep L3 10 min Nustep L3 10 min Nustep L3 10 min Nustep 10 min Nustep 10 min  Nustep L5 10 min Nustep L5 10 min     HR  2x10 2x10          SB calf str.  3x30\" 3x30\"  3x30\" 30\" x 3        LAQ  R: 2x10 3# R: 2x10 3#  2# 20x b/l 2# 20x b/l   2# 20x b/l      Standing h/s curl      10x  b/l   2x10 b/l     Heel slides  10x10\" b/l 10x10\" b/l                       Ther Activity             FSU  R: 6\" 2x10  L: 4\" 2x10 R: 6\" 2x10  L: 4\" 2x10 6\" x10 b/l   R: 6\" x15  L: 4\" x15 R: 6\" 2x10  L\" 6\" 2x10     FSD    4\" x10 R only 4\" x15 ea. 4\" 10x  L   6\" 10x R R: 6\" x15  L: 4\" x5 R: 6\" x15  L: 4 x 10     Squats  2x10 2x10 2x10         Total gym: bilateral squats     L22 x20 L22 2x 10  L22 2x10 L22 2x10     Gait Training                                       Modalities             CP       As needed declined today                                        " [see HPI] : see HPI [Negative] : Heme/Lymph

## 2024-01-09 ENCOUNTER — OFFICE VISIT (OUTPATIENT)
Dept: PHYSICAL THERAPY | Facility: CLINIC | Age: 79
End: 2024-01-09
Payer: MEDICARE

## 2024-01-09 DIAGNOSIS — Z96.651 S/P TKR (TOTAL KNEE REPLACEMENT), RIGHT: Primary | ICD-10-CM

## 2024-01-09 DIAGNOSIS — Z96.652 S/P TKR (TOTAL KNEE REPLACEMENT), LEFT: ICD-10-CM

## 2024-01-09 PROCEDURE — 97530 THERAPEUTIC ACTIVITIES: CPT

## 2024-01-09 PROCEDURE — 97110 THERAPEUTIC EXERCISES: CPT

## 2024-01-09 PROCEDURE — 97140 MANUAL THERAPY 1/> REGIONS: CPT

## 2024-01-12 ENCOUNTER — EVALUATION (OUTPATIENT)
Dept: PHYSICAL THERAPY | Facility: CLINIC | Age: 79
End: 2024-01-12
Payer: MEDICARE

## 2024-01-12 DIAGNOSIS — Z96.652 S/P TKR (TOTAL KNEE REPLACEMENT), LEFT: ICD-10-CM

## 2024-01-12 DIAGNOSIS — Z96.651 S/P TKR (TOTAL KNEE REPLACEMENT), RIGHT: Primary | ICD-10-CM

## 2024-01-12 PROCEDURE — 97530 THERAPEUTIC ACTIVITIES: CPT | Performed by: PHYSICAL THERAPIST

## 2024-01-12 PROCEDURE — 97110 THERAPEUTIC EXERCISES: CPT | Performed by: PHYSICAL THERAPIST

## 2024-01-12 NOTE — PROGRESS NOTES
PT Re-Evaluation     Today's date: 2024  Patient name: Agnieszka Camejo  : 1945  MRN: [id number]  Referring provider: Isak Breen DO  Dx:   Encounter Diagnosis     ICD-10-CM    1. S/P TKR (total knee replacement), right  Z96.651       2. S/P TKR (total knee replacement), left  Z96.652           Start Time: 1015  Stop Time: 1140  Total time in clinic (min): 85 minutes    Assessment  Assessment details: Agnieszka Camejo is a 78 y.o. female who presents with h/o chronic bilateral knee pain. Patient is currently s/p bilateral total knee arthroplasty (R DOS:  ; L DOS: ). Patient has attended a total of 9 physical therapy appointment dates, not including previous initial physical therapy following her right TKA which includes an additional 17 visits. Patient has maintained fair compliance with POC with inconsistent follow-through with prescribed HEP. Patient is reporting gradual reduction in pain, improved knee ROM, improved weightbearing tolerance, increased strength and improved quality of gait. Despite these improvements, Patient continues to present with significantly limited knee flexion ROM bilaterally with poor tolerance to stretching. Patient with inconsistent performance of stretches at home and has been largely home bound. Patient's overall mobility has significantly improved as demonstrated by reduction in time needed to complete functional tasks. Patient educated to increased frequency of visits from 2x/wk to 3x/wk and instructed to contact surgeon regarding pain management options. Patient would benefit from skilled physical therapy to address their aforementioned impairments, improve their level of function and to improve their overall quality of life.    Impairments: abnormal gait, abnormal muscle firing, abnormal or restricted ROM, activity intolerance, impaired balance, impaired physical strength, lacks appropriate home exercise program, pain with function, safety issue,  weight-bearing intolerance and poor body mechanics  Understanding of Dx/Px/POC: excellent   Prognosis: good    Goals  Short Term Goals: to be achieved by 4 weeks  1) Patient to be independent with basic HEP.  2) Decrease pain by 5/10 at its worst.  3) Increase knee flexion ROM to 100 deg.  4) Increase knee extension ROM by > 5 deg.  5) Increase LE strength by 1/2 MMT grade in all deficient planes.  6) Patient to negotiate steps with a step-to pattern with use of HR.   7) Patient to report decreased sleep interruption secondary to pain.  8) Increase ambulatory tolerance by 20 min with LRAD.    Long Term Goals: to be achieved by discharge  1) FOTO equal to or greater than TBD.  2) Patient to be independent with comprehensive HEP.  3) Abolish pain for improved quality of life.  4) Increase LE strength to 5/5 MMT grade in all planes to improve a/iadls.  5) Achieve full knee extension ROM to improve a/iadls.  6) Increase knee flexion ROM to within 5 deg of contralateral LE to improve a/iadls.  7) Patient to negotiate steps with a reciprocal pattern with use of Hr.  8) Increase ambulatory tolerance to 60 min to improve participation in social activities.  9) Patient to report no sleep interruption secondary to pain.      Plan  Patient would benefit from: skilled PT  Planned modality interventions: biofeedback, cryotherapy, electrical stimulation/Russian stimulation, TENS and low level laser therapy  Planned therapy interventions: activity modification, ADL retraining, ADL training, balance, balance/weight bearing training, body mechanics training, dressing changes, functional ROM exercises, gait training, home exercise program, IADL retraining, joint mobilization, manual therapy, massage, neuromuscular re-education, patient education, self care, strengthening, stretching, therapeutic activities, therapeutic exercise and transfer training  Frequency: 2-3x week.  Duration in weeks: 8  Plan of Care beginning date:  "1/12/2024  Plan of Care expiration date: 3/8/2024  Treatment plan discussed with: patient      Subjective Evaluation    History of Present Illness  Mechanism of injury: Patient estimates her right knee overall level of function to be approximately 50%. Patient reports that her right LE feels stronger. Patient's weightbearing tolerance has improved and she feels that her ROM has gotten better. Patient states that her right knee flexion ROM remains limited, which she notices when she is walking. Patient feels that her stride is shortened. Patient has been less active since living with her ex . Patient's right knee pain does not disrupt her sleep. Patient continues to control her pain with ice. Patient has stopped taking her Celebrex over the past week and since then feels like she \"has been walking like a duck.\"    Patient estimates her left knee overall level of function to be approximately 65%. Patient states that her left knee is bending better than her right. Patient has improved weightbearing tolerance and improved strength. Patient's primary complaint is when she pushes her left knee too far it is more painful and disrupts her sleep.     Patient has since transitioned from her SPC to independent ambulation. Patient remains in her ex 's home and has not transitioned back to her house yet.    Patient's next f/u appointment with her surgeon is scheduled some time in March.  Patient Goals  Patient goal: decreased pain, improved activity tolerance, improve quality of gait  Pain  Pain scale: R: 0/10 ; L: 0/10.  Pain scale at lowest: R: 0/10 ; L: 0/10.  Pain scale at highest: R: 4/10 ; L: 6/10.  Location: left > right knee: diffuse  Quality: sharp and dull ache    Social Support    Employment status: not working  Treatments  Current treatment: physical therapy      Objective     Observations   Left Knee   Positive for edema, effusion and incision (Clean, well-approximated, no signs of infection ; good " "scar tissue mobility).     Right Knee   Positive for edema, effusion and incision (good scar tissue mobility).     Active Range of Motion   Left Knee   Flexion: 92 degrees with pain  Extension: -5 degrees     Right Knee   Flexion: 84 degrees with pain  Extension: -3 degrees     Passive Range of Motion   Left Knee   Flexion: 95 degrees   Extension: 0 degrees     Right Knee   Flexion: 87 degrees with pain  Extension: 0 degrees with pain    Mobility   Patellar Mobility:   Left Knee   Hypomobile: left medial, left lateral, left superior and left inferior    Right Knee   Hypomobile: medial, lateral, superior and inferior     Strength/Myotome Testing     Left Hip   Planes of Motion   Flexion: 4+    Right Hip   Planes of Motion   Flexion: 4+    Left Knee   Flexion: 5  Extension: 5  Quadriceps contraction: fair    Right Knee   Flexion: 5  Extension: 5  Quadriceps contraction: fair    Left Ankle/Foot   Dorsiflexion: 5    Right Ankle/Foot   Dorsiflexion: 5    Tests   Left Ankle/Foot   Negative for Homans' sign.     Right Ankle/Foot   Negative for Homans' sign.     Ambulation   Weight-Bearing Status   Weight-Bearing Status (Left): full weight bearing   Weight-Bearing Status (Right): full weight-bearing      Ambulation: Level Surfaces   Ambulation without assistive device: independent    Observational Gait   Gait: antalgic     Functional Assessment      Squat    Left within functional limits and right within functional limits.     Comments  INITIAL EVALUATION (12/11/23)  Timed Up and Go (TUG): 45.10 sec, Mod I ambulation with RW and sit to/from stand with b/l arm rests  Five Times Sit to Stand Test: 37.40 sec, Mod I with b/l arm rests, good eccentric control, equal weightbearing    REEVALUATION (1/12/24):  Timed Up and Go (TUG): 11.21 sec, Independent ambulation, Mod I sit to/from stand with arm rests  Five Times Sit to Stand Test: 12.43 sec, Mod I with b/l arm rests, good eccentric control  FSU 6\" step: R: Mod I with b/l " "handrail ; L: Mod I with b/l handrail   FSD 6\" step: R: Mod I with b/l handrails, poor eccentric control ; L: Mod I with b/l handrails, poor eccentric control               POC expires Unit limit Auth  expiration date PT/OT + Visit Limit?   3/4/24 N/A 12/31/23 BOMN                 Visit/Unit Tracking  AUTH Status:  Date 1/3 1/5 1/9 1/12           N/A Used 1 2 3 4            Remaining                    Precautions: s/p L TKA (DOS: 12/7/23), R TKA (DOS: 9/25/23)      Manuals 12/11 12/18 12/22 12/26 12/28 1/3 1/5 1/9 1/12    B/L knee PROM    GR GR SC GR MS     Gr. II-IV pat mobs    GR GR        B/L calf, h/s str.    GR GR SC       Reassessment         GR    Neuro Re-Ed             SAQ  L: 2x10 5\" L: 2x10 5\"          TKE             Supine SLR     2x10 b/l 2x 10 b/l  2x10 b/l 2x10 b/l     Quad set with heel prop  L: x10 5\"  R: 20x5\" L: x10 5\"  R: 20x5\"                                                 Ther Ex             Patient education: pathophysiology, pain management, walking program, HEP review GR        GR    Recumbent bike  Nustep L3 10 min Nustep L3 10 min Nustep L3 10 min Nustep 10 min Nustep 10 min  Nustep L5 10 min Nustep L5 10 min Nustep L5 10 min    HR  2x10 2x10          SB calf str.  3x30\" 3x30\"  3x30\" 30\" x 3        LAQ  R: 2x10 3# R: 2x10 3#  2# 20x b/l 2# 20x b/l   2# 20x b/l      Standing h/s curl      10x  b/l   2x10 b/l     Heel slides  10x10\" b/l 10x10\" b/l                       Ther Activity             FSU  R: 6\" 2x10  L: 4\" 2x10 R: 6\" 2x10  L: 4\" 2x10 6\" x10 b/l   R: 6\" x15  L: 4\" x15 R: 6\" 2x10  L\" 6\" 2x10     FSD    4\" x10 R only 4\" x15 ea. 4\" 10x  L   6\" 10x R R: 6\" x15  L: 4\" x5 R: 6\" x15  L: 4 x 10     Squats  2x10 2x10 2x10         Total gym: bilateral squats     L22 x20 L22 2x 10  L22 2x10 L22 2x10     5 Times Sit to Stand Test, TUG, Stair training         Performed    Gait Training                                       Modalities             CP       As needed declined today              "

## 2024-01-12 NOTE — LETTER
2024    Isak Breen DO  [address]  [address]  [facility] [address]    Patient: Agnieszka Camejo   YOB: 1945   Date of Visit: 2024     Encounter Diagnosis     ICD-10-CM    1. S/P TKR (total knee replacement), right  Z96.651       2. S/P TKR (total knee replacement), left  Z96.652           Dear Dr. Breen:    Thank you for your recent referral of Agnieszka Camejo. Please review the attached evaluation summary from Agnieszka's recent visit.     Please verify that you agree with the plan of care by signing the attached order.     If you have any questions or concerns, please do not hesitate to call.     I sincerely appreciate the opportunity to share in the care of one of your patients and hope to have another opportunity to work with you in the near future.       Sincerely,    Etienne Chen, PT      Referring Provider:      I certify that I have read the below Plan of Care and certify the need for these services furnished under this plan of treatment while under my care.                    Isak Breen DO  [address]  [address]  [facility] [address]  Via Fax: [phone]          PT Re-Evaluation     Today's date: 2024  Patient name: Agnieszka Camejo  : 1945  MRN: [id number]  Referring provider: Isak Breen DO  Dx:   Encounter Diagnosis     ICD-10-CM    1. S/P TKR (total knee replacement), right  Z96.651       2. S/P TKR (total knee replacement), left  Z96.652           Start Time: 1015  Stop Time: 1140  Total time in clinic (min): 85 minutes    Assessment  Assessment details: Agnieszka Camejo is a 78 y.o. female who presents with h/o chronic bilateral knee pain. Patient is currently s/p bilateral total knee arthroplasty (R DOS:  ; L DOS: ). Patient has attended a total of 9 physical therapy appointment dates, not including previous initial physical therapy following her right TKA which includes an additional 17 visits. Patient has  maintained fair compliance with POC with inconsistent follow-through with prescribed HEP. Patient is reporting gradual reduction in pain, improved knee ROM, improved weightbearing tolerance, increased strength and improved quality of gait. Despite these improvements, Patient continues to present with significantly limited knee flexion ROM bilaterally with poor tolerance to stretching. Patient with inconsistent performance of stretches at home and has been largely home bound. Patient's overall mobility has significantly improved as demonstrated by reduction in time needed to complete functional tasks. Patient educated to increased frequency of visits from 2x/wk to 3x/wk and instructed to contact surgeon regarding pain management options. Patient would benefit from skilled physical therapy to address their aforementioned impairments, improve their level of function and to improve their overall quality of life.    Impairments: abnormal gait, abnormal muscle firing, abnormal or restricted ROM, activity intolerance, impaired balance, impaired physical strength, lacks appropriate home exercise program, pain with function, safety issue, weight-bearing intolerance and poor body mechanics  Understanding of Dx/Px/POC: excellent   Prognosis: good    Goals  Short Term Goals: to be achieved by 4 weeks  1) Patient to be independent with basic HEP.  2) Decrease pain by 5/10 at its worst.  3) Increase knee flexion ROM to 100 deg.  4) Increase knee extension ROM by > 5 deg.  5) Increase LE strength by 1/2 MMT grade in all deficient planes.  6) Patient to negotiate steps with a step-to pattern with use of HR.   7) Patient to report decreased sleep interruption secondary to pain.  8) Increase ambulatory tolerance by 20 min with LRAD.    Long Term Goals: to be achieved by discharge  1) FOTO equal to or greater than TBD.  2) Patient to be independent with comprehensive HEP.  3) Abolish pain for improved quality of life.  4) Increase LE  "strength to 5/5 MMT grade in all planes to improve a/iadls.  5) Achieve full knee extension ROM to improve a/iadls.  6) Increase knee flexion ROM to within 5 deg of contralateral LE to improve a/iadls.  7) Patient to negotiate steps with a reciprocal pattern with use of Hr.  8) Increase ambulatory tolerance to 60 min to improve participation in social activities.  9) Patient to report no sleep interruption secondary to pain.      Plan  Patient would benefit from: skilled PT  Planned modality interventions: biofeedback, cryotherapy, electrical stimulation/Russian stimulation, TENS and low level laser therapy  Planned therapy interventions: activity modification, ADL retraining, ADL training, balance, balance/weight bearing training, body mechanics training, dressing changes, functional ROM exercises, gait training, home exercise program, IADL retraining, joint mobilization, manual therapy, massage, neuromuscular re-education, patient education, self care, strengthening, stretching, therapeutic activities, therapeutic exercise and transfer training  Frequency: 2-3x week.  Duration in weeks: 8  Plan of Care beginning date: 1/12/2024  Plan of Care expiration date: 3/8/2024  Treatment plan discussed with: patient      Subjective Evaluation    History of Present Illness  Mechanism of injury: Patient estimates her right knee overall level of function to be approximately 50%. Patient reports that her right LE feels stronger. Patient's weightbearing tolerance has improved and she feels that her ROM has gotten better. Patient states that her right knee flexion ROM remains limited, which she notices when she is walking. Patient feels that her stride is shortened. Patient has been less active since living with her ex . Patient's right knee pain does not disrupt her sleep. Patient continues to control her pain with ice. Patient has stopped taking her Celebrex over the past week and since then feels like she \"has been " "walking like a duck.\"    Patient estimates her left knee overall level of function to be approximately 65%. Patient states that her left knee is bending better than her right. Patient has improved weightbearing tolerance and improved strength. Patient's primary complaint is when she pushes her left knee too far it is more painful and disrupts her sleep.     Patient has since transitioned from her SPC to independent ambulation. Patient remains in her ex 's home and has not transitioned back to her house yet.    Patient's next f/u appointment with her surgeon is scheduled some time in March.  Patient Goals  Patient goal: decreased pain, improved activity tolerance, improve quality of gait  Pain  Pain scale: R: 0/10 ; L: 0/10.  Pain scale at lowest: R: 0/10 ; L: 0/10.  Pain scale at highest: R: 4/10 ; L: 6/10.  Location: left > right knee: diffuse  Quality: sharp and dull ache    Social Support    Employment status: not working  Treatments  Current treatment: physical therapy      Objective     Observations   Left Knee   Positive for edema, effusion and incision (Clean, well-approximated, no signs of infection ; good scar tissue mobility).     Right Knee   Positive for edema, effusion and incision (good scar tissue mobility).     Active Range of Motion   Left Knee   Flexion: 92 degrees with pain  Extension: -5 degrees     Right Knee   Flexion: 84 degrees with pain  Extension: -3 degrees     Passive Range of Motion   Left Knee   Flexion: 95 degrees   Extension: 0 degrees     Right Knee   Flexion: 87 degrees with pain  Extension: 0 degrees with pain    Mobility   Patellar Mobility:   Left Knee   Hypomobile: left medial, left lateral, left superior and left inferior    Right Knee   Hypomobile: medial, lateral, superior and inferior     Strength/Myotome Testing     Left Hip   Planes of Motion   Flexion: 4+    Right Hip   Planes of Motion   Flexion: 4+    Left Knee   Flexion: 5  Extension: 5  Quadriceps contraction: " "fair    Right Knee   Flexion: 5  Extension: 5  Quadriceps contraction: fair    Left Ankle/Foot   Dorsiflexion: 5    Right Ankle/Foot   Dorsiflexion: 5    Tests   Left Ankle/Foot   Negative for Homans' sign.     Right Ankle/Foot   Negative for Homans' sign.     Ambulation   Weight-Bearing Status   Weight-Bearing Status (Left): full weight bearing   Weight-Bearing Status (Right): full weight-bearing      Ambulation: Level Surfaces   Ambulation without assistive device: independent    Observational Gait   Gait: antalgic     Functional Assessment      Squat    Left within functional limits and right within functional limits.     Comments  INITIAL EVALUATION (12/11/23)  Timed Up and Go (TUG): 45.10 sec, Mod I ambulation with RW and sit to/from stand with b/l arm rests  Five Times Sit to Stand Test: 37.40 sec, Mod I with b/l arm rests, good eccentric control, equal weightbearing    REEVALUATION (1/12/24):  Timed Up and Go (TUG): 11.21 sec, Independent ambulation, Mod I sit to/from stand with arm rests  Five Times Sit to Stand Test: 12.43 sec, Mod I with b/l arm rests, good eccentric control  FSU 6\" step: R: Mod I with b/l handrail ; L: Mod I with b/l handrail   FSD 6\" step: R: Mod I with b/l handrails, poor eccentric control ; L: Mod I with b/l handrails, poor eccentric control               POC expires Unit limit Auth  expiration date PT/OT + Visit Limit?   3/4/24 N/A 12/31/23 BOMN                 Visit/Unit Tracking  AUTH Status:  Date 1/3 1/5 1/9 1/12           N/A Used 1 2 3 4            Remaining                    Precautions: s/p L TKA (DOS: 12/7/23), R TKA (DOS: 9/25/23)      Manuals 12/11 12/18 12/22 12/26 12/28 1/3 1/5 1/9 1/12    B/L knee PROM    GR GR SC GR MS     Gr. II-IV pat mobs    GR GR        B/L calf, h/s str.    GR GR SC       Reassessment         GR    Neuro Re-Ed             SAQ  L: 2x10 5\" L: 2x10 5\"          TKE             Supine SLR     2x10 b/l 2x 10 b/l  2x10 b/l 2x10 b/l     Quad set with heel " "prop  L: x10 5\"  R: 20x5\" L: x10 5\"  R: 20x5\"                                                 Ther Ex             Patient education: pathophysiology, pain management, walking program, HEP review GR        GR    Recumbent bike  Nustep L3 10 min Nustep L3 10 min Nustep L3 10 min Nustep 10 min Nustep 10 min  Nustep L5 10 min Nustep L5 10 min Nustep L5 10 min    HR  2x10 2x10          SB calf str.  3x30\" 3x30\"  3x30\" 30\" x 3        LAQ  R: 2x10 3# R: 2x10 3#  2# 20x b/l 2# 20x b/l   2# 20x b/l      Standing h/s curl      10x  b/l   2x10 b/l     Heel slides  10x10\" b/l 10x10\" b/l                       Ther Activity             FSU  R: 6\" 2x10  L: 4\" 2x10 R: 6\" 2x10  L: 4\" 2x10 6\" x10 b/l   R: 6\" x15  L: 4\" x15 R: 6\" 2x10  L\" 6\" 2x10     FSD    4\" x10 R only 4\" x15 ea. 4\" 10x  L   6\" 10x R R: 6\" x15  L: 4\" x5 R: 6\" x15  L: 4 x 10     Squats  2x10 2x10 2x10         Total gym: bilateral squats     L22 x20 L22 2x 10  L22 2x10 L22 2x10     5 Times Sit to Stand Test, TUG, Stair training         Performed    Gait Training                                       Modalities             CP       As needed declined today                                          "

## 2024-01-16 ENCOUNTER — OFFICE VISIT (OUTPATIENT)
Dept: PHYSICAL THERAPY | Facility: CLINIC | Age: 79
End: 2024-01-16
Payer: MEDICARE

## 2024-01-16 DIAGNOSIS — Z96.652 S/P TKR (TOTAL KNEE REPLACEMENT), LEFT: ICD-10-CM

## 2024-01-16 DIAGNOSIS — Z96.651 S/P TKR (TOTAL KNEE REPLACEMENT), RIGHT: Primary | ICD-10-CM

## 2024-01-16 PROCEDURE — 97112 NEUROMUSCULAR REEDUCATION: CPT

## 2024-01-16 PROCEDURE — 97530 THERAPEUTIC ACTIVITIES: CPT

## 2024-01-16 PROCEDURE — 97140 MANUAL THERAPY 1/> REGIONS: CPT

## 2024-01-16 PROCEDURE — 97110 THERAPEUTIC EXERCISES: CPT

## 2024-01-16 NOTE — PROGRESS NOTES
"Daily Note     Today's date: 2024  Patient name: Agnieszka Camejo  : 1945  MRN: [id number]  Referring provider: Isak Breen DO  Dx:   Encounter Diagnosis     ICD-10-CM    1. S/P TKR (total knee replacement), right  Z96.651       2. S/P TKR (total knee replacement), left  Z96.652           Start Time: 1024  Stop Time: 1130  Total time in clinic (min): 66 minutes    Subjective: Pt noted no changes.       Objective: See treatment diary below   PROM of R knee flexion 92*  AAROM of R knee flexion at 87*    Assessment: Continued with treatment session at this time patient was able to make bwd rotations on rec bike as warm up. She noted that it is more challenging with knee flexion of her R>L. Tolerated treatment well. Patient exhibited good technique with therapeutic exercises and would benefit from continued PT. Pt was able to complete all exercises without any increase in pain. Advised patient that she may have some increase discomfort s/p PT session due to progressions.       Plan: Continue per plan of care.      POC expires Unit limit Auth  expiration date PT/OT + Visit Limit?   3/4/24 N/A 23 BOMN                 Visit/Unit Tracking  AUTH Status:  Date 1/3 1/5 1/9 1/12 1/16          N/A Used 1 2 3 4 5           Remaining                    Precautions: s/p L TKA (DOS: 23), R TKA (DOS: 23)      Manuals 12/11 12/18 12/22 12/26 12/28 1/3 1/5 1/9 1/12 1/16   B/L knee PROM    GR GR SC GR MS  SC   Gr. II-IV pat mobs    GR GR        B/L calf, h/s str.    GR GR SC       Reassessment         GR    Neuro Re-Ed             SAQ  L: 2x10 5\" L: 2x10 5\"          TKE             Supine SLR     2x10 b/l 2x 10 b/l  2x10 b/l 2x10 b/l  2x10 b/l with ecc control    Quad set with heel prop  L: x10 5\"  R: 20x5\" L: x10 5\"  R: 20x5\"                                                 Ther Ex             Patient education: pathophysiology, pain management, walking program, HEP review GR        GR    Recumbent bike  " "Nustep L3 10 min Nustep L3 10 min Nustep L3 10 min Nustep 10 min Nustep 10 min  Nustep L5 10 min Nustep L5 10 min Nustep L5 10 min BIKE   Rocking 10 min / bwd rotation   HR  2x10 2x10          SB calf str.  3x30\" 3x30\"  3x30\" 30\" x 3        LAQ  R: 2x10 3# R: 2x10 3#  2# 20x b/l 2# 20x b/l   2# 20x b/l      Standing h/s curl      10x  b/l   2x10 b/l     Heel slides  10x10\" b/l 10x10\" b/l                       Ther Activity             FSU  R: 6\" 2x10  L: 4\" 2x10 R: 6\" 2x10  L: 4\" 2x10 6\" x10 b/l   R: 6\" x15  L: 4\" x15 R: 6\" 2x10  L\" 6\" 2x10   3x 10 b/l 6\"   FSD    4\" x10 R only 4\" x15 ea. 4\" 10x  L   6\" 10x R R: 6\" x15  L: 4\" x5 R: 6\" x15  L: 4 x 10  10x 6\"    Squats  2x10 2x10 2x10         Total gym: bilateral squats     L22 x20 L22 2x 10  L22 2x10 L22 2x10 L22 2x 10  L22 2x 10 w/ stretch into flexion    5 Times Sit to Stand Test, TUG, Stair training         Performed    Gait Training                                       Modalities             CP       As needed declined today                          "

## 2024-01-19 ENCOUNTER — OFFICE VISIT (OUTPATIENT)
Dept: PHYSICAL THERAPY | Facility: CLINIC | Age: 79
End: 2024-01-19
Payer: MEDICARE

## 2024-01-19 DIAGNOSIS — Z96.651 S/P TKR (TOTAL KNEE REPLACEMENT), RIGHT: Primary | ICD-10-CM

## 2024-01-19 DIAGNOSIS — Z96.652 S/P TKR (TOTAL KNEE REPLACEMENT), LEFT: ICD-10-CM

## 2024-01-19 PROCEDURE — 97110 THERAPEUTIC EXERCISES: CPT

## 2024-01-19 PROCEDURE — 97140 MANUAL THERAPY 1/> REGIONS: CPT

## 2024-01-19 PROCEDURE — 97530 THERAPEUTIC ACTIVITIES: CPT

## 2024-01-19 NOTE — PROGRESS NOTES
"Daily Note     Today's date: 2024  Patient name: Agnieszka Camejo  : 1945  MRN: [id number]  Referring provider: Isak Breen DO  Dx:   Encounter Diagnosis     ICD-10-CM    1. S/P TKR (total knee replacement), right  Z96.651       2. S/P TKR (total knee replacement), left  Z96.652           Start Time: 1055  Stop Time: 1200  Total time in clinic (min): 65 minutes    Subjective: pt noted that she was sore after last treatment but noted that the following day felt good. She noted that last night was the first night since surgery that she did not sleep with ice.       Objective: See treatment diary below   PROM of R knee flexion ~95*w/ hip hike  AAROM of R knee flexion at 87*    Assessment:  Continued with treatment session pt was able to complete all exercises this visit with no changes in pain status. Focus on flexion ROM on R > L at this time. Tolerated treatment well. Positive encouragement t/o exercises and flexion ROM.   Patient exhibited good technique with therapeutic exercises and would benefit from continued PT.     Education reviewed with patient with verbal agreement and understanding.   - HEP compliance w/ daily ROM of b/l knees.   - DOMS 24 to 48 hours of muscle soreness.       Plan: Continue per plan of care.      POC expires Unit limit Auth  expiration date PT/OT + Visit Limit?   3/4/24 N/A 23 BOMN                 Visit/Unit Tracking  AUTH Status:  Date 1/3 1/5 1/9 1/12 1/16 1/19         N/A Used 1 2 3 4 5 6          Remaining                    Precautions: s/p L TKA (DOS: 23), R TKA (DOS: 23)      Manuals 1/19  12/22 12/26 12/28 1/3 1/5 1/9 1/12 1/16   B/L knee PROM SC flexion supine and seated   Extension (supine)   GR GR SC GR MS  SC   Gr. II-IV pat mobs    GR GR        B/L calf, h/s str.    GR GR SC       Reassessment         GR                              Neuro Re-Ed             SAQ   L: 2x10 5\"          TKE             Supine SLR 2x 10 b/l ecc control.     2x10 b/l " "2x 10 b/l  2x10 b/l 2x10 b/l  2x10 b/l with ecc control    Quad set with heel prop   L: x10 5\"  R: 20x5\"                                                 Ther Ex             Patient education: pathophysiology, pain management, walking program, HEP review         GR    Recumbent bike Bike rocking to rotation   Nustep L3 10 min Nustep L3 10 min Nustep 10 min Nustep 10 min  Nustep L5 10 min Nustep L5 10 min Nustep L5 10 min BIKE   Rocking 10 min / bwd rotation   HR   2x10          SB calf str.   3x30\"  3x30\" 30\" x 3        LAQ NV  R: 2x10 3#  2# 20x b/l 2# 20x b/l   2# 20x b/l      Standing h/s curl 2x 10 b/l      10x  b/l   2x10 b/l     Heel slides Reviewed for HEP   10x10\" b/l          Seated w/ opposite OP knee flexion stretch  10\"x 10 b/l                                                    Ther Activity             FSU 15x  b/l   R: 6\" 2x10  L: 4\" 2x10 6\" x10 b/l   R: 6\" x15  L: 4\" x15 R: 6\" 2x10  L\" 6\" 2x10   3x 10 b/l 6\"   FSD 10x 4\" b/l    4\" x10 R only 4\" x15 ea. 4\" 10x  L   6\" 10x R R: 6\" x15  L: 4\" x5 R: 6\" x15  L: 4 x 10  10x 6\"    Squats   2x10 2x10         Total gym: bilateral squats L22 2x 10 w/ stretch into flexion     L22 x20 L22 2x 10  L22 2x10 L22 2x10 L22 2x 10  L22 2x 10 w/ stretch into flexion    5 Times Sit to Stand Test, TUG, Stair training         Performed    Gait Training                                       Modalities             CP       As needed declined today                             1 on 1 time for 45 minutes on 1/19/24  "

## 2024-01-22 ENCOUNTER — OFFICE VISIT (OUTPATIENT)
Dept: PHYSICAL THERAPY | Facility: CLINIC | Age: 79
End: 2024-01-22
Payer: MEDICARE

## 2024-01-22 ENCOUNTER — RA CDI HCC (OUTPATIENT)
Dept: OTHER | Facility: HOSPITAL | Age: 79
End: 2024-01-22

## 2024-01-22 DIAGNOSIS — Z96.652 S/P TKR (TOTAL KNEE REPLACEMENT), LEFT: ICD-10-CM

## 2024-01-22 DIAGNOSIS — Z96.651 S/P TKR (TOTAL KNEE REPLACEMENT), RIGHT: Primary | ICD-10-CM

## 2024-01-22 PROCEDURE — 97112 NEUROMUSCULAR REEDUCATION: CPT

## 2024-01-22 PROCEDURE — 97110 THERAPEUTIC EXERCISES: CPT

## 2024-01-22 PROCEDURE — 97530 THERAPEUTIC ACTIVITIES: CPT

## 2024-01-22 PROCEDURE — 97140 MANUAL THERAPY 1/> REGIONS: CPT

## 2024-01-22 NOTE — PROGRESS NOTES
"Daily Note     Today's date: 2024  Patient name: Agnieszka Camejo  : 1945  MRN: [id number]  Referring provider: Isak Breen DO  Dx:   Encounter Diagnosis     ICD-10-CM    1. S/P TKR (total knee replacement), right  Z96.651       2. S/P TKR (total knee replacement), left  Z96.652           Start Time: 1143  Stop Time: 1245  Total time in clinic (min): 62 minutes    Subjective: Pt noted that she has been feeling good and noted that she did not need to use ice since Wednesday.  Overall noted that she has been feeling \"great\".       Objective: See treatment diary below   ROM of R knee flexion 95* w/o hip hike  PROM of L knee flexion  115* w/o hip hike.       Assessment:  Continued with treatment session,  with focus on ROM and strengthening.  Was able to make progressions in ROM as well as functional strengthening. Pt was able to complete STS w/o UE with verbal cues. Tolerated treatment well. Patient exhibited good technique with therapeutic exercises and would benefit from continued PT  S/p treatment noted feeling good and noted that her calf feels looser as well as her b/l knees.     Plan: Continue per plan of care.      POC expires Unit limit Auth  expiration date PT/OT + Visit Limit?   3/4/24 N/A 23 BOMN                 Visit/Unit Tracking  AUTH Status:  Date 1/3 1/5 1/9 1/12 1/16 1/19 1/22        N/A Used 1 2 3 4 5 6 7         Remaining                    Precautions: s/p L TKA (DOS: 23), R TKA (DOS: 23)      Manuals 1/19 1/22  12/26 12/28 1/3 1/5 1/9 1/12 1/16   B/L knee PROM SC flexion supine and seated   Extension (supine) SC   GR GR SC GR MS  SC   Gr. II-IV pat mobs    GR GR        B/L calf, h/s str.    GR GR SC       Reassessment         GR                              Neuro Re-Ed             SAQ             TKE             Supine SLR 2x 10 b/l ecc control.  3x 10 ecc lowering b/l    2x10 b/l 2x 10 b/l  2x10 b/l 2x10 b/l  2x10 b/l with ecc control    Quad set with heel prop     " "                                               Ther Ex             Patient education: pathophysiology, pain management, walking program, HEP review         GR    Recumbent bike Bike rocking to rotation  Bike 10 min with rotation   Nustep L3 10 min Nustep 10 min Nustep 10 min  Nustep L5 10 min Nustep L5 10 min Nustep L5 10 min BIKE   Rocking 10 min / bwd rotation   HR             SB calf str.     3x30\" 30\" x 3        LAQ NV 2.5# 3x10    2# 20x b/l 2# 20x b/l   2# 20x b/l      Standing h/s curl 2x 10 b/l  2x 10     10x  b/l   2x10 b/l     Heel slides Reviewed for HEP             Seated w/ opposite OP knee flexion stretch  10\"x 10 b/l                                                    Ther Activity             FSU 15x  b/l  6\" 2x 10 b/l   6\" x10 b/l   R: 6\" x15  L: 4\" x15 R: 6\" 2x10  L\" 6\" 2x10   3x 10 b/l 6\"   FSD 10x 4\" b/l    4\" x10 R only 4\" x15 ea. 4\" 10x  L   6\" 10x R R: 6\" x15  L: 4\" x5 R: 6\" x15  L: 4 x 10  10x 6\"    Sit to stands   2x 10            Squats    2x10         Total gym: bilateral squats L22 2x 10 w/ stretch into flexion  L22 2x 10 w/ stretch into flexion    L22 x20 L22 2x 10  L22 2x10 L22 2x10 L22 2x 10  L22 2x 10 w/ stretch into flexion    5 Times Sit to Stand Test, TUG, Stair training         Performed    Gait Training                                       Modalities             CP       As needed declined today                              "

## 2024-01-24 ENCOUNTER — OFFICE VISIT (OUTPATIENT)
Dept: PHYSICAL THERAPY | Facility: CLINIC | Age: 79
End: 2024-01-24
Payer: MEDICARE

## 2024-01-24 DIAGNOSIS — Z96.652 S/P TKR (TOTAL KNEE REPLACEMENT), LEFT: ICD-10-CM

## 2024-01-24 DIAGNOSIS — Z96.651 S/P TKR (TOTAL KNEE REPLACEMENT), RIGHT: Primary | ICD-10-CM

## 2024-01-24 PROCEDURE — 97140 MANUAL THERAPY 1/> REGIONS: CPT | Performed by: PHYSICAL THERAPIST

## 2024-01-24 PROCEDURE — 97530 THERAPEUTIC ACTIVITIES: CPT | Performed by: PHYSICAL THERAPIST

## 2024-01-24 PROCEDURE — 97110 THERAPEUTIC EXERCISES: CPT | Performed by: PHYSICAL THERAPIST

## 2024-01-24 NOTE — PROGRESS NOTES
Daily Note     Today's date: 2024  Patient name: Agnieszka Camejo  : 1945  MRN: [id number]  Referring provider: Isak Breen DO  Dx:   Encounter Diagnosis     ICD-10-CM    1. S/P TKR (total knee replacement), right  Z96.651       2. S/P TKR (total knee replacement), left  Z96.652           Start Time: 1115  Stop Time: 1200  Total time in clinic (min): 45 minutes    Subjective: Patient reports that her sleep has been better.       Objective: See treatment diary below      Assessment: Tolerated treatment poor. Patient demonstrated fatigue post treatment and would benefit from continued PT. Patient with increased right knee pain during gentle knee flexion PROM. Patient requested to not have left knee stretched d/t pain. Right knee flexion PROM equal to 95 deg.      Plan: Continue per plan of care.  Progress treatment as tolerated.       POC expires Unit limit Auth  expiration date PT/OT + Visit Limit?   3/4/24 N/A 23 BOMN                 Visit/Unit Tracking  AUTH Status:  Date 1/3 1/5 1/9 1/12 1/16 1/19 1/22 1/24       N/A Used 1 2 3 4 5 6 7 8        Remaining                    Precautions: s/p L TKA (DOS: 23), R TKA (DOS: 23)      Manuals 1/19 1/22 1/24  12/28 1/3 1/5 1/9 1/12 1/16   B/L knee PROM SC flexion supine and seated   Extension (supine) SC  GR R only  GR SC GR MS  SC   Gr. II-IV pat mobs     GR        B/L calf, h/s str.     GR SC       Reassessment         GR                              Neuro Re-Ed             SAQ             TKE             Supine SLR 2x 10 b/l ecc control.  3x 10 ecc lowering b/l    2x10 b/l 2x 10 b/l  2x10 b/l 2x10 b/l  2x10 b/l with ecc control    Quad set with heel prop                                                    Ther Ex             Patient education: pathophysiology, pain management, walking program, HEP review         GR    Recumbent bike Bike rocking to rotation  Bike 10 min with rotation  Bike 10 min  Nustep 10 min Nustep 10 min  Nustep L5 10  "min Nustep L5 10 min Nustep L5 10 min BIKE   Rocking 10 min / bwd rotation   HR             SB calf str.   Prostretch 3x30\" b/l  3x30\" 30\" x 3        LAQ NV 2.5# 3x10  Matrix: NV  2# 20x b/l 2# 20x b/l   2# 20x b/l      Standing h/s curl 2x 10 b/l  2x 10  Matrix: NV   10x  b/l   2x10 b/l     Heel slides Reviewed for HEP             Seated w/ opposite OP knee flexion stretch  10\"x 10 b/l                                                    Ther Activity             FSU 15x  b/l  6\" 2x 10 b/l  8\" 2x10 b/l with step stool for rail    R: 6\" x15  L: 4\" x15 R: 6\" 2x10  L\" 6\" 2x10   3x 10 b/l 6\"   FSD 10x 4\" b/l     4\" x15 ea. 4\" 10x  L   6\" 10x R R: 6\" x15  L: 4\" x5 R: 6\" x15  L: 4 x 10  10x 6\"    Sit to stands   2x 10            Squats             Total gym: bilateral squats L22 2x 10 w/ stretch into flexion  L22 2x 10 w/ stretch into flexion  L22 3x10  L22 x20 L22 2x 10  L22 2x10 L22 2x10 L22 2x 10  L22 2x 10 w/ stretch into flexion    5 Times Sit to Stand Test, TUG, Stair training         Performed    Gait Training                                       Modalities             CP       As needed declined today                                "

## 2024-01-26 ENCOUNTER — OFFICE VISIT (OUTPATIENT)
Dept: PHYSICAL THERAPY | Facility: CLINIC | Age: 79
End: 2024-01-26
Payer: MEDICARE

## 2024-01-26 ENCOUNTER — OFFICE VISIT (OUTPATIENT)
Dept: FAMILY MEDICINE CLINIC | Facility: CLINIC | Age: 79
End: 2024-01-26
Payer: MEDICARE

## 2024-01-26 VITALS
SYSTOLIC BLOOD PRESSURE: 132 MMHG | OXYGEN SATURATION: 96 % | BODY MASS INDEX: 34.04 KG/M2 | HEIGHT: 62 IN | WEIGHT: 185 LBS | DIASTOLIC BLOOD PRESSURE: 80 MMHG | HEART RATE: 76 BPM

## 2024-01-26 DIAGNOSIS — M15.9 PRIMARY OSTEOARTHRITIS INVOLVING MULTIPLE JOINTS: ICD-10-CM

## 2024-01-26 DIAGNOSIS — E78.5 DYSLIPIDEMIA: ICD-10-CM

## 2024-01-26 DIAGNOSIS — E66.09 CLASS 1 OBESITY DUE TO EXCESS CALORIES WITHOUT SERIOUS COMORBIDITY WITH BODY MASS INDEX (BMI) OF 33.0 TO 33.9 IN ADULT: ICD-10-CM

## 2024-01-26 DIAGNOSIS — K21.9 GASTROESOPHAGEAL REFLUX DISEASE WITHOUT ESOPHAGITIS: ICD-10-CM

## 2024-01-26 DIAGNOSIS — I10 PRIMARY HYPERTENSION: Primary | ICD-10-CM

## 2024-01-26 DIAGNOSIS — E03.9 ACQUIRED HYPOTHYROIDISM: ICD-10-CM

## 2024-01-26 DIAGNOSIS — Z96.652 S/P TKR (TOTAL KNEE REPLACEMENT), LEFT: ICD-10-CM

## 2024-01-26 DIAGNOSIS — Z96.651 S/P TKR (TOTAL KNEE REPLACEMENT), RIGHT: Primary | ICD-10-CM

## 2024-01-26 DIAGNOSIS — N18.31 STAGE 3A CHRONIC KIDNEY DISEASE (HCC): ICD-10-CM

## 2024-01-26 DIAGNOSIS — Z13.0 SCREENING FOR DEFICIENCY ANEMIA: ICD-10-CM

## 2024-01-26 DIAGNOSIS — R73.03 PRE-DIABETES: ICD-10-CM

## 2024-01-26 PROCEDURE — 99214 OFFICE O/P EST MOD 30 MIN: CPT | Performed by: INTERNAL MEDICINE

## 2024-01-26 PROCEDURE — 97530 THERAPEUTIC ACTIVITIES: CPT

## 2024-01-26 PROCEDURE — 97140 MANUAL THERAPY 1/> REGIONS: CPT

## 2024-01-26 PROCEDURE — 97110 THERAPEUTIC EXERCISES: CPT

## 2024-01-26 RX ORDER — METOPROLOL TARTRATE 50 MG/1
TABLET, FILM COATED ORAL
Qty: 90 TABLET | Refills: 1 | Status: SHIPPED | OUTPATIENT
Start: 2024-01-26

## 2024-01-26 RX ORDER — LEVOTHYROXINE SODIUM 0.03 MG/1
25 TABLET ORAL EVERY MORNING
Qty: 90 TABLET | Refills: 1 | Status: SHIPPED | OUTPATIENT
Start: 2024-01-26

## 2024-01-26 NOTE — PROGRESS NOTES
Office Visit Note  24     Agnieszka Camejo 78 y.o. female MRN: [id number]  : 1945    Assessment:     1. Primary hypertension  Assessment & Plan:  Today's blood pressure is 132/80 currently patient is taking metoprolol 50 mg daily we will continue with the same.    Orders:  -     metoprolol tartrate (LOPRESSOR) 50 mg tablet; Take half a tablet of the 50 mg in the morning and half a tablet of 50 mg in the evening    2. Acquired hypothyroidism  Assessment & Plan:  Continue levothyroxine 25 mcg check the TSH level with the next blood work    Orders:  -     levothyroxine 25 mcg tablet; Take 1 tablet (25 mcg total) by mouth every morning  -     TSH, 3rd generation with Free T4 reflex; Future; Expected date: 2024    3. Class 1 obesity due to excess calories without serious comorbidity with body mass index (BMI) of 33.0 to 33.9 in adult  Assessment & Plan:  Patient BMI is 33.84 gained couple of pounds in the recent past after surgery recommend very strongly to follow strict diet lifestyle modification cut back on calorie intake and lose weight.      4. Gastroesophageal reflux disease without esophagitis  Assessment & Plan:  Patient is not able to go off of this medication omeprazole she gets immediately symptoms of discomfort reflux when she does not take the medication we will continue with the same for now omeprazole 20 mg.  Patient had tried Pepcid it did not help she also tried Tums did not help      5. Primary osteoarthritis involving multiple joints  Assessment & Plan:  Patient is currently taking Tylenol up to 6 tablets a day.  Surgery for bilateral knee replacement recommend strongly to see if he can taper it down and start taking 1 tablet 3 times a day and cut it down gradually.      6. Stage 3a chronic kidney disease (HCC)  Assessment & Plan:  Lab Results   Component Value Date    EGFR 58 2023    EGFR 56 2023    EGFR 59 2023    CREATININE 0.95 2023    CREATININE 0.97  08/29/2023    CREATININE 0.93 06/29/2023   We will follow-up with repeat lab check the BUN and creatinine.  Last BUN done on December 8 was 24 creatinine 0.90 with a GFR of 66      7. Screening for deficiency anemia  -     CBC and differential; Future    8. Dyslipidemia  -     Lipid panel; Future    9. Pre-diabetes  -     Comprehensive metabolic panel; Future  -     Hemoglobin A1C; Future; Expected date: 01/26/2024  -     UA w Reflex to Microscopic w Reflex to Culture; Future; Expected date: 01/26/2024               Discussion Summary and Plan:  Today's care plan and medications were reviewed with patient in detail and all their questions answered to their satisfaction.    Chief Complaint   Patient presents with    Follow-up      Subjective:  Patient is coming here for a follow-up evaluation with regards to symptoms of osteoarthritis status post bilateral knee replacement.  The last 1 was on the left side on December 7.  She is undergoing physical therapy 3 times a week.  Able to flex the knees gradually.  No shortness of breath chest pain palpitation or any other associated symptoms.  Reviewed the reports from the hospital regarding surgery last labs done on December 8 reviewed unremarkable.  Medications reviewed.        The following portions of the patient's history were reviewed and updated as appropriate: allergies, current medications, past family history, past medical history, past social history, past surgical history and problem list.    Review of Systems   Constitutional:  Negative for chills and fever.   HENT:  Negative for ear pain and sore throat.    Eyes:  Negative for pain and visual disturbance.   Respiratory:  Negative for cough and shortness of breath.    Cardiovascular:  Negative for chest pain and palpitations.   Gastrointestinal:  Negative for abdominal pain and vomiting.   Genitourinary:  Negative for dysuria and hematuria.   Musculoskeletal:  Negative for arthralgias and back pain.   Skin:   Negative for color change and rash.   Neurological:  Negative for seizures and syncope.   All other systems reviewed and are negative.        Historical Information   Patient Active Problem List   Diagnosis    HTN (hypertension)    Hypothyroidism    Gastroesophageal reflux disease    Osteoarthritis of multiple joints    Stage 3a chronic kidney disease (HCC)    Class 1 obesity due to excess calories without serious comorbidity in adult    Preoperative clearance    Osteopenia of multiple sites     Past Medical History:   Diagnosis Date    Arthritis     left knee injection-2017    Disease of thyroid gland     hypothyroidism    DJD (degenerative joint disease)     GERD (gastroesophageal reflux disease)     Hypertension     Seasonal allergies     Wears glasses      Past Surgical History:   Procedure Laterality Date    BREAST BIOPSY Left 2014    BREAST SURGERY Left     x2 bx-benign     SECTION  1966    x1    COLONOSCOPY      age 67    COLONOSCOPY N/A 3/6/2017    Procedure: COLONOSCOPY;  Surgeon: Gonzalo Osorio MD;  Location: [facility] [place] [facility];  Service:     ESOPHAGOGASTRODUODENOSCOPY N/A 3/6/2017    Procedure: ESOPHAGOGASTRODUODENOSCOPY (EGD);  Surgeon: Gonzalo Osorio MD;  Location: [facility];  Service:     ESOPHAGOGASTRODUODENOSCOPY N/A 2017    Procedure: ESOPHAGOGASTRODUODENOSCOPY (EGD);  Surgeon: Gonzalo Osorio MD;  Location: [facility];  Service: Gastroenterology    HERNIA REPAIR      incisional hernia right side    HYSTERECTOMY  1966    partial- ovaries remain    KNEE ARTHROSCOPY Left     torn meniscus    LIPOMA RESECTION      lower right abdomen    FL EGD TRANSORAL BIOPSY SINGLE/MULTIPLE N/A 2018    Procedure: ESOPHAGOGASTRODUODENOSCOPY (EGD);  Surgeon: Gonzalo Osorio MD;  Location: [facility];  Service: Gastroenterology    ROTATOR CUFF REPAIR Left     SHOULDER ARTHROSCOPY W/ ROTATOR CUFF REPAIR Left     TRIGGER FINGER RELEASE Right     thumb     Social History     Substance and Sexual Activity  "  Alcohol Use No     Social History     Substance and Sexual Activity   Drug Use No     Social History     Tobacco Use   Smoking Status Never    Passive exposure: Never   Smokeless Tobacco Never     Family History   Problem Relation Age of Onset    Hepatitis Brother         hep C from [facility]     Health Maintenance Due   Topic    Hepatitis C Screening     Zoster Vaccine (1 of 2)    Pneumococcal Vaccine: 65+ Years (1 - PCV)    Influenza Vaccine (1)    COVID-19 Vaccine (2 - 2023-24 season)    Depression Screening       Meds/Allergies       Current Outpatient Medications:     aspirin (ECOTRIN LOW STRENGTH) 81 mg EC tablet, Take 81 mg by mouth 2 (two) times a day, Disp: , Rfl:     calcium carbonate-vitamin D 500 mg-5 mcg per tablet, Take 1 tablet by mouth every morning, Disp: , Rfl:     levothyroxine 25 mcg tablet, Take 1 tablet (25 mcg total) by mouth every morning, Disp: 90 tablet, Rfl: 1    metoprolol tartrate (LOPRESSOR) 50 mg tablet, Take half a tablet of the 50 mg in the morning and half a tablet of 50 mg in the evening, Disp: 90 tablet, Rfl: 1    Multiple Vitamin (multivitamin) tablet, Take 1 tablet by mouth daily, Disp: , Rfl:     mupirocin (BACTROBAN) 2 % ointment, APPLY OINTMENT TOPICALLY TO AFFECTED AREA TWICE DAILY. BEGIN 5 DAYS PRIOR TO SURGERY, APPY TO NOSTRILS, Disp: , Rfl:     omeprazole (PriLOSEC) 20 mg delayed release capsule, One tablet by mouth 1/2 hour before breakfast daily, Disp: 90 capsule, Rfl: 1      Objective:    Vitals:   /80 (BP Location: Right arm, Patient Position: Sitting, Cuff Size: Standard)   Pulse 76   Ht 5' 2\" (1.575 m)   Wt 83.9 kg (185 lb)   SpO2 96%   BMI 33.84 kg/m²   Body mass index is 33.84 kg/m².  Vitals:    01/26/24 0849   Weight: 83.9 kg (185 lb)       Physical Exam  Vitals and nursing note reviewed.   Constitutional:       Appearance: Normal appearance.   Cardiovascular:      Rate and Rhythm: Normal rate and regular rhythm.      Heart sounds: Normal heart sounds. "   Pulmonary:      Effort: Pulmonary effort is normal.      Breath sounds: Normal breath sounds.   Abdominal:      Palpations: Abdomen is soft.   Musculoskeletal:      Cervical back: Normal range of motion and neck supple.      Right lower leg: No edema.      Left lower leg: No edema.      Comments: Movements of the knee joint causing discomfort and pain   Neurological:      Mental Status: She is alert.         Lab Review   No visits with results within 2 Month(s) from this visit.   Latest known visit with results is:   Appointment on 11/09/2023   Component Date Value Ref Range Status    Sodium 11/09/2023 137  135 - 147 mmol/L Final    Potassium 11/09/2023 4.5  3.5 - 5.3 mmol/L Final    Chloride 11/09/2023 103  96 - 108 mmol/L Final    CO2 11/09/2023 28  21 - 32 mmol/L Final    ANION GAP 11/09/2023 6  mmol/L Final    BUN 11/09/2023 20  5 - 25 mg/dL Final    Creatinine 11/09/2023 0.95  0.60 - 1.30 mg/dL Final    Standardized to IDMS reference method    Glucose 11/09/2023 94  65 - 140 mg/dL Final    If the patient is fasting, the ADA then defines impaired fasting glucose as > 100 mg/dL and diabetes as > or equal to 123 mg/dL.    Calcium 11/09/2023 9.0  8.4 - 10.2 mg/dL Final    AST 11/09/2023 16  13 - 39 U/L Final    ALT 11/09/2023 13  7 - 52 U/L Final    Specimen collection should occur prior to Sulfasalazine administration due to the potential for falsely depressed results.     Alkaline Phosphatase 11/09/2023 76  34 - 104 U/L Final    Total Protein 11/09/2023 6.7  6.4 - 8.4 g/dL Final    Albumin 11/09/2023 4.1  3.5 - 5.0 g/dL Final    Total Bilirubin 11/09/2023 0.47  0.20 - 1.00 mg/dL Final    Use of this assay is not recommended for patients undergoing treatment with eltrombopag due to the potential for falsely elevated results.  N-acetyl-p-benzoquinone imine (metabolite of Acetaminophen) will generate erroneously low results in samples for patients that have taken an overdose of Acetaminophen.    eGFR 11/09/2023 58  " ml/min/1.73sq m Final    WBC 11/09/2023 6.04  4.31 - 10.16 Thousand/uL Final    RBC 11/09/2023 4.63  3.81 - 5.12 Million/uL Final    Hemoglobin 11/09/2023 14.7  11.5 - 15.4 g/dL Final    Hematocrit 11/09/2023 44.6  34.8 - 46.1 % Final    MCV 11/09/2023 96  82 - 98 fL Final    MCH 11/09/2023 31.7  26.8 - 34.3 pg Final    MCHC 11/09/2023 33.0  31.4 - 37.4 g/dL Final    RDW 11/09/2023 13.1  11.6 - 15.1 % Final    MPV 11/09/2023 9.5  8.9 - 12.7 fL Final    Platelets 11/09/2023 285  149 - 390 Thousands/uL Final    nRBC 11/09/2023 0  /100 WBCs Final    Neutrophils Relative 11/09/2023 59  43 - 75 % Final    Immat GRANS % 11/09/2023 0  0 - 2 % Final    Lymphocytes Relative 11/09/2023 27  14 - 44 % Final    Monocytes Relative 11/09/2023 10  4 - 12 % Final    Eosinophils Relative 11/09/2023 3  0 - 6 % Final    Basophils Relative 11/09/2023 1  0 - 1 % Final    Neutrophils Absolute 11/09/2023 3.61  1.85 - 7.62 Thousands/µL Final    Immature Grans Absolute 11/09/2023 0.01  0.00 - 0.20 Thousand/uL Final    Lymphocytes Absolute 11/09/2023 1.60  0.60 - 4.47 Thousands/µL Final    Monocytes Absolute 11/09/2023 0.60  0.17 - 1.22 Thousand/µL Final    Eosinophils Absolute 11/09/2023 0.17  0.00 - 0.61 Thousand/µL Final    Basophils Absolute 11/09/2023 0.05  0.00 - 0.10 Thousands/µL Final    Sed Rate 11/09/2023 4  0 - 29 mm/hour Final    Protime 11/09/2023 12.4  11.6 - 14.5 seconds Final    INR 11/09/2023 0.91  0.84 - 1.19 Final         Aviva Delgado MD        \"This note has been constructed using a voice recognition system.Therefore there may be syntax, spelling, and/or grammatical errors. Please call if you have any questions. \"  "

## 2024-01-26 NOTE — ASSESSMENT & PLAN NOTE
Impression: Open angle with borderline findings, low risk, bilateral: H40.013. Plan: No change in RNFL thickness. No treatment at this time. Patient is currently taking Tylenol up to 6 tablets a day.  Surgery for bilateral knee replacement recommend strongly to see if he can taper it down and start taking 1 tablet 3 times a day and cut it down gradually.

## 2024-01-26 NOTE — ASSESSMENT & PLAN NOTE
Patient BMI is 33.84 gained couple of pounds in the recent past after surgery recommend very strongly to follow strict diet lifestyle modification cut back on calorie intake and lose weight.

## 2024-01-26 NOTE — ASSESSMENT & PLAN NOTE
Patient is not able to go off of this medication omeprazole she gets immediately symptoms of discomfort reflux when she does not take the medication we will continue with the same for now omeprazole 20 mg.  Patient had tried Pepcid it did not help she also tried Tums did not help

## 2024-01-26 NOTE — PROGRESS NOTES
"Daily Note     Today's date: 2024  Patient name: Agnieszka Camejo  : 1945  MRN: [id number]  Referring provider: Isak Breen DO  Dx:   Encounter Diagnosis     ICD-10-CM    1. S/P TKR (total knee replacement), right  Z96.651       2. S/P TKR (total knee replacement), left  Z96.652                      Subjective: Patient reports that her left knee feels \"strained\" today. Offers no complaints regarding the right knee at this time. She says that the prostretch last session really helped to alleviate some pain in her calf.       Objective: See treatment diary below      Assessment: Tolerated treatment well. Patient demonstrating hip flexion compensations during forward step ups, however was able to correct with cues. Continues with significant tightness with right knee flexion PROM. Patient demonstrated fatigue post treatment, exhibited good technique with therapeutic exercises, and would benefit from continued PT      Plan: Continue per plan of care.  Progress treatment as tolerated.       POC expires Unit limit Auth  expiration date PT/OT + Visit Limit?   3/4/24 N/A 23 BOMN                 Visit/Unit Tracking  AUTH Status:  Date 1/3 1/5 1/9 1/12 1/16 1/19 1/22 1/24 1/26      N/A Used 1 2 3 4 5 6 7 8 9       Remaining                    Precautions: s/p L TKA (DOS: 23), R TKA (DOS: 23)      Manuals 1/19 1/22 1/24 1/26 12/28 1/3 1/5 1/9 1/12 1/16   B/L knee PROM SC flexion supine and seated   Extension (supine) SC  GR R only BE B/L GR SC GR MS  SC   Gr. II-IV pat mobs     GR        B/L calf, h/s str.     GR SC       Reassessment         GR                              Neuro Re-Ed             SAQ             TKE             Supine SLR 2x 10 b/l ecc control.  3x 10 ecc lowering b/l   3x10 B/L 2x10 b/l 2x 10 b/l  2x10 b/l 2x10 b/l  2x10 b/l with ecc control    Quad set with heel prop                                                    Ther Ex             Patient education: pathophysiology, " "pain management, walking program, HEP review         GR    Recumbent bike Bike rocking to rotation  Bike 10 min with rotation  Bike 10 min Bike 10 min Nustep 10 min Nustep 10 min  Nustep L5 10 min Nustep L5 10 min Nustep L5 10 min BIKE   Rocking 10 min / bwd rotation   HR             SB calf str.   Prostretch 3x30\" b/l Prostretch 3x30\" b/l 3x30\" 30\" x 3        LAQ NV 2.5# 3x10  Matrix: NV Matrix: 10# 1x10  1x5 2# 20x b/l 2# 20x b/l   2# 20x b/l      Standing h/s curl 2x 10 b/l  2x 10  Matrix: NV Matrix: 10# 1x10  1x5  10x  b/l   2x10 b/l     Heel slides Reviewed for HEP             Seated w/ opposite OP knee flexion stretch  10\"x 10 b/l                                                    Ther Activity             FSU 15x  b/l  6\" 2x 10 b/l  8\" 2x10 b/l with step rail 8\" 2x10 b/l with step rail   R: 6\" x15  L: 4\" x15 R: 6\" 2x10  L\" 6\" 2x10   3x 10 b/l 6\"   FSD 10x 4\" b/l     4\" x15 ea. 4\" 10x  L   6\" 10x R R: 6\" x15  L: 4\" x5 R: 6\" x15  L: 4 x 10  10x 6\"    Sit to stands   2x 10            Squats             Total gym: bilateral squats L22 2x 10 w/ stretch into flexion  L22 2x 10 w/ stretch into flexion  L22 3x10 L22  3x10 L22 x20 L22 2x 10  L22 2x10 L22 2x10 L22 2x 10  L22 2x 10 w/ stretch into flexion    5 Times Sit to Stand Test, TUG, Stair training         Performed    Gait Training                                       Modalities             CP       As needed declined today                                  "

## 2024-01-26 NOTE — ASSESSMENT & PLAN NOTE
Lab Results   Component Value Date    EGFR 58 11/09/2023    EGFR 56 08/29/2023    EGFR 59 06/29/2023    CREATININE 0.95 11/09/2023    CREATININE 0.97 08/29/2023    CREATININE 0.93 06/29/2023   We will follow-up with repeat lab check the BUN and creatinine.  Last BUN done on December 8 was 24 creatinine 0.90 with a GFR of 66

## 2024-01-26 NOTE — ASSESSMENT & PLAN NOTE
Today's blood pressure is 132/80 currently patient is taking metoprolol 50 mg daily we will continue with the same.

## 2024-01-29 ENCOUNTER — OFFICE VISIT (OUTPATIENT)
Dept: PHYSICAL THERAPY | Facility: CLINIC | Age: 79
End: 2024-01-29
Payer: MEDICARE

## 2024-01-29 DIAGNOSIS — Z96.652 S/P TKR (TOTAL KNEE REPLACEMENT), LEFT: ICD-10-CM

## 2024-01-29 DIAGNOSIS — Z96.651 S/P TKR (TOTAL KNEE REPLACEMENT), RIGHT: Primary | ICD-10-CM

## 2024-01-29 PROCEDURE — 97112 NEUROMUSCULAR REEDUCATION: CPT

## 2024-01-29 PROCEDURE — 97110 THERAPEUTIC EXERCISES: CPT

## 2024-01-29 NOTE — PROGRESS NOTES
Daily Note     Today's date: 2024  Patient name: Agnieszka Camejo  : 1945  MRN: [id number]  Referring provider: Isak Breen DO  Dx:   Encounter Diagnosis     ICD-10-CM    1. S/P TKR (total knee replacement), right  Z96.651       2. S/P TKR (total knee replacement), left  Z96.652                      Subjective: Patient noted increased soreness post last treatment still lasting today. Patient noted she performed exercises once already today in the morning to see if her soreness would decrease but noted no change in soreness. Patient noted that she still has a lot of soreness. Patient has doctor appointment scheduled for tomorrow. Discussed with patient to perform HEP 1x per  day until nv if she is still having increased soreness.       Objective: See treatment diary below      Assessment: Tolerated treatment fair.  Due to patient noting increased soreness lasting more than two days modified treatment today to patient tolerance. Resume full treatment nv as per patient tolerance. Patient inquired about knee flexion stretch off step discussed with patient to perform without hip compensation.  Patient would benefit from continued PT      Plan: Continue per plan of care.      POC expires Unit limit Auth  expiration date PT/OT + Visit Limit?   3/4/24 N/A 23 BOMN                 Visit/Unit Tracking  AUTH Status:  Date 1/3 1/5 1/9 1/12 1/16 1/19 1/22 1/24 1/26 /29     N/A Used 1 2 3 4 5 6 7 8 9 10      Remaining                    Precautions: s/p L TKA (DOS: 23), R TKA (DOS: 23)      Manuals    B/L knee PROM SC flexion supine and seated   Extension (supine) SC  GR R only BE B/L AC B/L   GR MS  SC   Gr. II-IV pat mobs             B/L calf, h/s str.             Reassessment         GR                              Neuro Re-Ed             SAQ             TKE             Supine SLR 2x 10 b/l ecc control.  3x 10 ecc lowering b/l   3x10 B/L 2 x 10 B/L   "2x10 b/l 2x10 b/l  2x10 b/l with ecc control    Quad set with heel prop                                                    Ther Ex             Patient education: pathophysiology, pain management, walking program, HEP review         GR    Recumbent bike Bike rocking to rotation  Bike 10 min with rotation  Bike 10 min Bike 10 min Bike 10 min  Nustep L5 10 min Nustep L5 10 min Nustep L5 10 min BIKE   Rocking 10 min / bwd rotation   HR             SB calf str.   Prostretch 3x30\" b/l Prostretch 3x30\" b/l np        LAQ NV 2.5# 3x10  Matrix: NV Matrix: 10# 1x10  1x5 np   2# 20x b/l      Standing h/s curl 2x 10 b/l  2x 10  Matrix: NV Matrix: 10# 1x10  1x5 np   2x10 b/l     Heel slides Reviewed for HEP             Seated w/ opposite OP knee flexion stretch  10\"x 10 b/l                                                    Ther Activity             FSU 15x  b/l  6\" 2x 10 b/l  8\" 2x10 b/l with step rail 8\" 2x10 b/l with step rail 6\" 2 x 10 b/l   R: 6\" x15  L: 4\" x15 R: 6\" 2x10  L\" 6\" 2x10   3x 10 b/l 6\"   FSD 10x 4\" b/l       R: 6\" x15  L: 4\" x5 R: 6\" x15  L: 4 x 10  10x 6\"    Sit to stands   2x 10            Squats             Total gym: bilateral squats L22 2x 10 w/ stretch into flexion  L22 2x 10 w/ stretch into flexion  L22 3x10 L22  3x10 L22 2 x 10  L22 2x10 L22 2x10 L22 2x 10  L22 2x 10 w/ stretch into flexion    5 Times Sit to Stand Test, TUG, Stair training         Performed    Gait Training                                       Modalities             CP                                          "

## 2024-01-31 ENCOUNTER — OFFICE VISIT (OUTPATIENT)
Dept: PHYSICAL THERAPY | Facility: CLINIC | Age: 79
End: 2024-01-31
Payer: MEDICARE

## 2024-01-31 DIAGNOSIS — Z96.651 S/P TKR (TOTAL KNEE REPLACEMENT), RIGHT: Primary | ICD-10-CM

## 2024-01-31 DIAGNOSIS — Z96.652 S/P TKR (TOTAL KNEE REPLACEMENT), LEFT: ICD-10-CM

## 2024-01-31 PROCEDURE — 97140 MANUAL THERAPY 1/> REGIONS: CPT

## 2024-01-31 PROCEDURE — 97530 THERAPEUTIC ACTIVITIES: CPT

## 2024-01-31 PROCEDURE — 97110 THERAPEUTIC EXERCISES: CPT

## 2024-01-31 PROCEDURE — 97112 NEUROMUSCULAR REEDUCATION: CPT

## 2024-01-31 NOTE — PROGRESS NOTES
Daily Note     Today's date: 2024  Patient name: Agnieszka Camejo  : 1945  MRN: [id number]  Referring provider: Isak Breen DO  Dx:   Encounter Diagnosis     ICD-10-CM    1. S/P TKR (total knee replacement), right  Z96.651       2. S/P TKR (total knee replacement), left  Z96.652           Start Time: 1030  Stop Time: 1130  Total time in clinic (min): 60 minutes    Subjective: pt noted that she has been hurting more and needing to use the SPC as needed now  again. She noted that she does go to see the dr after PT this visit.       Objective: See treatment diary below      Assessment:  Continued with treatment session with focus on b/l Knee ROM into flexion and extension R>L at this time due to increase tightness/stiffness. Was able to make a full rotation on  Tolerated treatment well. Patient exhibited good technique with therapeutic exercises and would benefit from continued PT. Pt noted that her L knee bother her more than R with knee extension stretch. After treatment session noted having no pain in her b/l knees and feeling like she is able to move it better.     Education reviewed with patient. At this time patient noted that she has been completing the HEP on a daily basis 1x a day at least.   DOMS reviewed with patient that she may have some increase secondary to progressions.     Plan: Continue per plan of care.      POC expires Unit limit Auth  expiration date PT/OT + Visit Limit?   3/4/24 N/A 23 BOMN                 Visit/Unit Tracking  AUTH Status:  Date 1/3 1/5 1/9 1/12 1/16 1/19 1/22 1/24 1/26 /29 1/31    N/A Used 1 2 3 4 5 6 7 8 9 10 11     Remaining                    Precautions: s/p L TKA (DOS: 23), R TKA (DOS: 23)      Manuals    B/L knee PROM SC flexion supine and seated   Extension (supine) SC  GR R only BE B/L AC B/L  SC b/l   MS  SC   Gr. II-IV pat mobs             B/L calf, h/s str.             Reassessment         GR   "                            Neuro Re-Ed             SAQ             TKE             Supine SLR 2x 10 b/l ecc control.  3x 10 ecc lowering b/l   3x10 B/L 2 x 10 B/L 2x 10 b/l   2x10 b/l  2x10 b/l with ecc control    Quad set with heel prop             Hurdles F and L       NV                                 Ther Ex             Patient education: pathophysiology, pain management, walking program, HEP review         GR    Recumbent bike Bike rocking to rotation  Bike 10 min with rotation  Bike 10 min Bike 10 min Bike 10 min Bike 10 min fwd / rocking  Nustep L5 10 min Nustep L5 10 min BIKE   Rocking 10 min / bwd rotation   HR             SB calf str.   Prostretch 3x30\" b/l Prostretch 3x30\" b/l np        LAQ NV 2.5# 3x10  Matrix: NV Matrix: 10# 1x10  1x5 np   2# 20x b/l      Standing h/s curl 2x 10 b/l  2x 10  Matrix: NV Matrix: 10# 1x10  1x5 np   2x10 b/l     Heel slides Reviewed for HEP      5\" 10x ea.        Seated w/ opposite OP knee flexion stretch  10\"x 10 b/l                                                    Ther Activity             FSU 15x  b/l  6\" 2x 10 b/l  8\" 2x10 b/l with step rail 8\" 2x10 b/l with step rail 6\" 2 x 10 b/l    R: 6\" 2x10  L\" 6\" 2x10   3x 10 b/l 6\"   FSD 10x 4\" b/l        R: 6\" x15  L: 4 x 10  10x 6\"    Sit to stands   2x 10     2x 10        Squats             Total gym: bilateral squats L22 2x 10 w/ stretch into flexion  L22 2x 10 w/ stretch into flexion  L22 3x10 L22  3x10 L22 2 x 10 L22 2x 10   L22 2x10 L22 2x 10  L22 2x 10 w/ stretch into flexion    5 Times Sit to Stand Test, TUG, Stair training         Performed    Gait Training                                       Modalities             CP                                            "

## 2024-02-02 ENCOUNTER — OFFICE VISIT (OUTPATIENT)
Dept: PHYSICAL THERAPY | Facility: CLINIC | Age: 79
End: 2024-02-02
Payer: MEDICARE

## 2024-02-02 DIAGNOSIS — Z96.652 S/P TKR (TOTAL KNEE REPLACEMENT), LEFT: ICD-10-CM

## 2024-02-02 DIAGNOSIS — Z96.651 S/P TKR (TOTAL KNEE REPLACEMENT), RIGHT: Primary | ICD-10-CM

## 2024-02-02 PROCEDURE — 97140 MANUAL THERAPY 1/> REGIONS: CPT

## 2024-02-02 PROCEDURE — 97110 THERAPEUTIC EXERCISES: CPT

## 2024-02-02 PROCEDURE — 97112 NEUROMUSCULAR REEDUCATION: CPT

## 2024-02-02 PROCEDURE — 97530 THERAPEUTIC ACTIVITIES: CPT

## 2024-02-02 NOTE — PROGRESS NOTES
"Daily Note     Today's date: 2024  Patient name: Agnieszka Camejo  : 1945  MRN: [id number]  Referring provider: Isak Breen DO  Dx:   Encounter Diagnosis     ICD-10-CM    1. S/P TKR (total knee replacement), right  Z96.651       2. S/P TKR (total knee replacement), left  Z96.652                      Subjective: Patient reports that she has noticed a \"straining\" sensation in her left upper thigh which she hadn't been experiencing before. She says that she saw her surgeon on Wednesday and was advised that she is likely stretching too much or too intensely that she is having as much pain as she is and difficulties walking.       Objective: See treatment diary below      Assessment: Tolerated treatment well. Compensatory hip flexion movements noted during marcia step overs due to deficits in knee flexion ROM. Patient demonstrated fatigue post treatment, exhibited good technique with therapeutic exercises, and would benefit from continued PT      Plan: Progress treatment as tolerated.       POC expires Unit limit Auth  expiration date PT/OT + Visit Limit?   3/4/24 N/A 23 BOMN                 Visit/Unit Tracking  AUTH Status:  Date 1/3 1/5 1/9 1/12 1/16 1/19 1/22 1/24 1/26 /29 1/31 2/2   N/A Used 1 2 3 4 5 6 7 8 9 10 11 12    Remaining                    Precautions: s/p L TKA (DOS: 23), R TKA (DOS: 23)      Manuals  2/2    B/L knee PROM SC flexion supine and seated   Extension (supine) SC  GR R only BE B/L AC B/L  SC b/l  BE R only,   Deferred L  MS  SC   Gr. II-IV pat mobs             B/L calf, h/s str.             Reassessment         GR                              Neuro Re-Ed             SAQ             TKE             Supine SLR 2x 10 b/l ecc control.  3x 10 ecc lowering b/l   3x10 B/L 2 x 10 B/L 2x 10 b/l  2x10 B/L 2x10 b/l  2x10 b/l with ecc control    Quad set with heel prop             Hurdles F and L       NV 2 laps ea dir                 " "                Ther Ex             Patient education: pathophysiology, pain management, walking program, HEP review       HEP, stretching, progress  GR    Recumbent bike Bike rocking to rotation  Bike 10 min with rotation  Bike 10 min Bike 10 min Bike 10 min Bike 10 min fwd / rocking Bike 10 min  Nustep L5 10 min Nustep L5 10 min BIKE   Rocking 10 min / bwd rotation   HR             SB calf str.   Prostretch 3x30\" b/l Prostretch 3x30\" b/l np        LAQ NV 2.5# 3x10  Matrix: NV Matrix: 10# 1x10  1x5 np   2# 20x b/l      Standing h/s curl 2x 10 b/l  2x 10  Matrix: NV Matrix: 10# 1x10  1x5 np   2x10 b/l     Heel slides Reviewed for HEP      5\" 10x ea.        Seated w/ opposite OP knee flexion stretch  10\"x 10 b/l                                                    Ther Activity             FSU 15x  b/l  6\" 2x 10 b/l  8\" 2x10 b/l with step rail 8\" 2x10 b/l with step rail 6\" 2 x 10 b/l    R: 6\" 2x10  L\" 6\" 2x10   3x 10 b/l 6\"   FSD 10x 4\" b/l        R: 6\" x15  L: 4 x 10  10x 6\"    Sit to stands   2x 10     2x 10  2x10       Squats             Total gym: bilateral squats L22 2x 10 w/ stretch into flexion  L22 2x 10 w/ stretch into flexion  L22 3x10 L22  3x10 L22 2 x 10 L22 2x 10  L22 2x10 L22 2x10 L22 2x 10  L22 2x 10 w/ stretch into flexion    5 Times Sit to Stand Test, TUG, Stair training         Performed    Gait Training                                       Modalities             CP                                              "

## 2024-02-05 ENCOUNTER — APPOINTMENT (OUTPATIENT)
Dept: PHYSICAL THERAPY | Facility: CLINIC | Age: 79
End: 2024-02-05
Payer: MEDICARE

## 2024-02-07 ENCOUNTER — APPOINTMENT (OUTPATIENT)
Dept: PHYSICAL THERAPY | Facility: CLINIC | Age: 79
End: 2024-02-07
Payer: MEDICARE

## 2024-02-09 ENCOUNTER — APPOINTMENT (OUTPATIENT)
Dept: PHYSICAL THERAPY | Facility: CLINIC | Age: 79
End: 2024-02-09
Payer: MEDICARE

## 2024-02-12 ENCOUNTER — OFFICE VISIT (OUTPATIENT)
Dept: PHYSICAL THERAPY | Facility: CLINIC | Age: 79
End: 2024-02-12
Payer: MEDICARE

## 2024-02-12 DIAGNOSIS — Z96.652 S/P TKR (TOTAL KNEE REPLACEMENT), LEFT: ICD-10-CM

## 2024-02-12 DIAGNOSIS — Z96.651 S/P TKR (TOTAL KNEE REPLACEMENT), RIGHT: Primary | ICD-10-CM

## 2024-02-12 PROCEDURE — 97110 THERAPEUTIC EXERCISES: CPT

## 2024-02-12 PROCEDURE — 97530 THERAPEUTIC ACTIVITIES: CPT

## 2024-02-12 PROCEDURE — 97112 NEUROMUSCULAR REEDUCATION: CPT

## 2024-02-12 PROCEDURE — 97140 MANUAL THERAPY 1/> REGIONS: CPT

## 2024-02-12 NOTE — PROGRESS NOTES
"Daily Note     Today's date: 2024  Patient name: Agnieszka Camejo  : 1945  MRN: [id number]  Referring provider: Isak Breen DO  Dx:   Encounter Diagnosis     ICD-10-CM    1. S/P TKR (total knee replacement), right  Z96.651       2. S/P TKR (total knee replacement), left  Z96.652           Start Time: 1550  Stop Time: 1645  Total time in clinic (min): 55 minutes    Subjective: Pt noted that she still has pain but noted that \"its different\". Reports that she has been walking over 600ft a day and noted that it has been helping.       Objective: See treatment diary below      Assessment:  Continued with treatment session with focus on ROM as well as strength in b/l knees. Tolerated treatment well. Pt note progressions with hurdles this visit with verbal cues to complete w/o compensation technique.  Patient exhibited good technique with therapeutic exercises and would benefit from continued PT S/p treatment session she noted that her knee has not felt this good in a long time and was able to walk out of PT session w/o use of SPC.       Plan: Continue per plan of care.      POC expires Unit limit Auth  expiration date PT/OT + Visit Limit?   3/4/24 N/A 23 BOMN                 Visit/Unit Tracking  AUTH Status:  Date  2/2   N/A Used 13  3 4 5 6 7 8 9 10 11 12    Remaining                    Precautions: s/p L TKA (DOS: 23), R TKA (DOS: 23)      Manuals  2/2    B/L knee PROM SC flexion supine and seated   Extension (supine) SC  GR R only BE B/L AC B/L  SC b/l  BE R only,   Deferred L  SC  SC   Gr. II-IV pat mobs             B/L calf, h/s str.             Reassessment                                       Neuro Re-Ed             SAQ             TKE             Supine SLR 2x 10 b/l ecc control.  3x 10 ecc lowering b/l   3x10 B/L 2 x 10 B/L 2x 10 b/l  2x10 B/L 2x 10   1x 10   1x 5 b/l with ecc control    Quad " "set with heel prop             Hurdles F and L       NV 2 laps ea dir  2 laps ea. VC's no UE                                 Ther Ex             Patient education: pathophysiology, pain management, walking program, HEP review       HEP, stretching, progress      Recumbent bike Bike rocking to rotation  Bike 10 min with rotation  Bike 10 min Bike 10 min Bike 10 min Bike 10 min fwd / rocking Bike 10 min  Nustep L4 with LE only   BIKE   Rocking 10 min / bwd rotation   HR             SB calf str.   Prostretch 3x30\" b/l Prostretch 3x30\" b/l np        LAQ NV 2.5# 3x10  Matrix: NV Matrix: 10# 1x10  1x5 np        Standing h/s curl 2x 10 b/l  2x 10  Matrix: NV Matrix: 10# 1x10  1x5 np        Heel slides Reviewed for HEP      5\" 10x ea.        Seated w/ opposite OP knee flexion stretch  10\"x 10 b/l                                                    Ther Activity             FSU 15x  b/l  6\" 2x 10 b/l  8\" 2x10 b/l with step rail 8\" 2x10 b/l with step rail 6\" 2 x 10 b/l      3x 10 b/l 6\"   FSD 10x 4\" b/l          10x 6\"    Sit to stands   2x 10     2x 10  2x10  2x 10 in lower chair no UE      Squats             Total gym: bilateral squats L22 2x 10 w/ stretch into flexion  L22 2x 10 w/ stretch into flexion  L22 3x10 L22  3x10 L22 2 x 10 L22 2x 10  L22 2x10 NV  L22 2x 10 w/ stretch into flexion    5 Times Sit to Stand Test, TUG, Stair training             Gait Training                                       Modalities             CP                                                "

## 2024-02-14 ENCOUNTER — EVALUATION (OUTPATIENT)
Dept: PHYSICAL THERAPY | Facility: CLINIC | Age: 79
End: 2024-02-14
Payer: MEDICARE

## 2024-02-14 DIAGNOSIS — Z96.651 S/P TKR (TOTAL KNEE REPLACEMENT), RIGHT: Primary | ICD-10-CM

## 2024-02-14 DIAGNOSIS — Z96.652 S/P TKR (TOTAL KNEE REPLACEMENT), LEFT: ICD-10-CM

## 2024-02-14 PROCEDURE — 97110 THERAPEUTIC EXERCISES: CPT | Performed by: PHYSICAL THERAPIST

## 2024-02-14 NOTE — LETTER
2024    Isak Breen DO  [address]  [address]  [facility] [address]    Patient: Agnieszka Camejo   YOB: 1945   Date of Visit: 2024     Encounter Diagnosis     ICD-10-CM    1. S/P TKR (total knee replacement), right  Z96.651       2. S/P TKR (total knee replacement), left  Z96.652           Dear Dr. Breen:    Thank you for your recent referral of Agnieszka Camejo. Please review the attached evaluation summary from Agnieszka's recent visit.     Please verify that you agree with the plan of care by signing the attached order.     If you have any questions or concerns, please do not hesitate to call.     I sincerely appreciate the opportunity to share in the care of one of your patients and hope to have another opportunity to work with you in the near future.       Sincerely,    Etienne Chen, PT      Referring Provider:      I certify that I have read the below Plan of Care and certify the need for these services furnished under this plan of treatment while under my care.                    Isak Breen DO  [address]  [address]  [facility] [address]  Via Fax: [phone]          PT Re-Evaluation     Today's date: 2024  Patient name: Agnieszka Camejo  : 1945  MRN: [id number]  Referring provider: Isak Breen DO  Dx:   Encounter Diagnosis     ICD-10-CM    1. S/P TKR (total knee replacement), right  Z96.651       2. S/P TKR (total knee replacement), left  Z96.652           Start Time: 1115  Stop Time: 1200  Total time in clinic (min): 45 minutes    Assessment  Assessment details: Agnieszka Camejo is a 78 y.o. female who presents with h/o chronic bilateral knee pain. Patient is currently s/p bilateral total knee arthroplasty (R DOS:  ; L DOS: ). Patient has attended a total of 18 physical therapy appointment dates, not including previous initial physical therapy following her right TKA which includes an additional 17 visits. Patient has  maintained fair compliance with POC with inconsistent follow-through with prescribed HEP. Patient is reporting gradual reduction in pain, improved knee ROM, improved weightbearing tolerance, increased strength and improved quality of gait. Despite these improvements, Patient continues to present with significantly limited knee flexion ROM bilaterally (R > L) with poor tolerance to stretching. Patient with inconsistent performance of stretches at home and has been largely home bound. Patient's overall mobility has significantly improved as demonstrated by reduction in time needed to complete functional tasks. Patient reports her right knee overall level of function to be approximately 98% and her left knee 75%. Patient would benefit from skilled physical therapy to address their aforementioned impairments, improve their level of function and to improve their overall quality of life.  Impairments: abnormal gait, abnormal or restricted ROM, activity intolerance, impaired physical strength, lacks appropriate home exercise program, pain with function, weight-bearing intolerance and poor body mechanics  Understanding of Dx/Px/POC: excellent   Prognosis: good    Goals  Short Term Goals: to be achieved by 4 weeks  1) Patient to be independent with basic HEP. MET  2) Decrease pain by 5/10 at its worst. MET  3) Increase knee flexion ROM to 100 deg. PARTIALLY MET  4) Increase knee extension ROM by > 5 deg. MET  5) Increase LE strength by 1/2 MMT grade in all deficient planes. MET  6) Patient to negotiate steps with a step-to pattern with use of HR. MET  7) Patient to report decreased sleep interruption secondary to pain. MET  8) Increase ambulatory tolerance by 20 min with LRAD. MET    Long Term Goals: to be achieved by discharge ALL GOALS PROGRESSING  1) FOTO equal to or greater than 71.  2) Patient to be independent with comprehensive HEP.  3) Abolish pain for improved quality of life.  4) Increase LE strength to 5/5 MMT  grade in all planes to improve a/iadls.  5) Achieve full knee extension ROM to improve a/iadls.  6) Increase knee flexion ROM to within 5 deg of contralateral LE to improve a/iadls.  7) Patient to negotiate steps with a reciprocal pattern with use of Hr.  8) Increase ambulatory tolerance to 60 min to improve participation in social activities.  9) Patient to report no sleep interruption secondary to pain.    Plan  Patient would benefit from: skilled PT  Planned modality interventions: biofeedback, cryotherapy, electrical stimulation/Russian stimulation, TENS and low level laser therapy  Planned therapy interventions: activity modification, ADL retraining, ADL training, balance, balance/weight bearing training, body mechanics training, dressing changes, functional ROM exercises, gait training, home exercise program, IADL retraining, joint mobilization, manual therapy, massage, neuromuscular re-education, patient education, self care, strengthening, stretching, therapeutic activities, therapeutic exercise and transfer training  Frequency: 1-3x week.  Duration in weeks: 6  Plan of Care beginning date: 2/14/2024  Plan of Care expiration date: 3/27/2024  Treatment plan discussed with: patient      Subjective Evaluation    History of Present Illness  Mechanism of injury: Patient currently estimates her right knee overall level of function to be approximately 98%. Patient's primary limitation in her right knee is her limited flexion ROM. Patient does not notice her limited ROM when performing functional activities. Patient does continue to have mild pain, but this is very manageable.     Patient estimates her left knee overall level of function to be approximately 75%. Patient continues to have intermittent pain and her swelling fluctuates. Patient is reporting improved ROM in her left knee compared to her right knee.     Patient's quality of sleep has significantly improved. Patient has been using her SPC at times and  also walks independently. Patient has been pushing her ambulation to 600 ft and has walked up to a half hour. Patient remains in her ex 's home and has not transitioned back to her house yet.     Patient's next f/u appointment with her surgeon is scheduled on 3/8.  Patient Goals  Patient goal: decreased pain, improved activity tolerance, improve quality of gait  Pain  Pain scale: R: 0/10 ; L: 0/10.  Pain scale at lowest: R: 0/10 ; L: 0/10.  Pain scale at highest: R: 2/10 ; L: 4/10.  Location: left > right knee: diffuse  Quality: dull ache (strained, tired)    Social Support    Employment status: not working  Treatments  Current treatment: physical therapy      Objective     Observations   Left Knee   Positive for edema, effusion and incision (Clean, well-approximated, no signs of infection ; good scar tissue mobility).     Right Knee   Positive for edema, effusion and incision (good scar tissue mobility).     Active Range of Motion   Left Knee   Flexion: 98 degrees with pain  Extension: -5 degrees     Right Knee   Flexion: 85 degrees with pain  Extension: -3 degrees     Passive Range of Motion   Left Knee   Flexion: 102 degrees   Extension: -3 degrees     Right Knee   Flexion: 94 degrees with pain  Extension: 0 degrees with pain    Mobility   Patellar Mobility:   Left Knee   Hypomobile: left medial, left lateral, left superior and left inferior    Right Knee   Hypomobile: medial, lateral, superior and inferior     Strength/Myotome Testing     Left Hip   Planes of Motion   Flexion: 5    Right Hip   Planes of Motion   Flexion: 5    Left Knee   Flexion: 5  Extension: 5  Quadriceps contraction: fair    Right Knee   Flexion: 5  Extension: 5  Quadriceps contraction: fair    Left Ankle/Foot   Dorsiflexion: 5    Right Ankle/Foot   Dorsiflexion: 5    Tests   Left Ankle/Foot   Negative for Homans' sign.     Right Ankle/Foot   Negative for Homans' sign.     Ambulation   Weight-Bearing Status   Weight-Bearing Status  "(Left): full weight bearing   Weight-Bearing Status (Right): full weight-bearing      Ambulation: Level Surfaces   Ambulation with assistive device: independent  Ambulation without assistive device: independent    Observational Gait   Gait: antalgic   Decreased walking speed and stride length.     Functional Assessment      Squat    Left within functional limits and right within functional limits.     Comments  INITIAL EVALUATION (12/11/23)  Timed Up and Go (TUG): 45.10 sec, Mod I ambulation with RW and sit to/from stand with b/l arm rests  Five Times Sit to Stand Test: 37.40 sec, Mod I with b/l arm rests, good eccentric control, equal weightbearing    REEVALUATION (1/12/24):  Timed Up and Go (TUG): 11.21 sec, Independent ambulation, Mod I sit to/from stand with arm rests  Five Times Sit to Stand Test: 12.43 sec, Mod I with b/l arm rests, good eccentric control  FSU 6\" step: R: Mod I with b/l handrail ; L: Mod I with b/l handrail   FSD 6\" step: R: Mod I with b/l handrails, poor eccentric control ; L: Mod I with b/l handrails, poor eccentric control    REEVALUATION (2/14/24):  Timed Up and Go (TUG): 10.39 sec, Independent sit to/from stand  Five Times Sit to Stand Test: 13.58 sec, Independent, good eccentric control ; 10.21 sec, Mod I, good eccentric control  FSU 8\" step: Mod I, WFL bilaterally  FSD 8\" step: Mod I with single hand rail, fair eccentric control bilaterally               POC expires Unit limit Auth  expiration date PT/OT + Visit Limit?   3/27/24 N/A N/A BOMN                 Visit/Unit Tracking  AUTH Status:  Date 2/12 2/14 1/12 1/16 1/19 1/22 1/24 1/26 /29 1/31 2/2   N/A Used 13 14  4 5 6 7 8 9 10 11 12    Remaining                    Precautions: s/p L TKA (DOS: 12/7/23), R TKA (DOS: 9/25/23)      Manuals 1/19 1/22 1/24 1/26 1/29 1/31 2/2 2/12 2/14    B/L knee PROM SC flexion supine and seated   Extension (supine) SC  GR R only BE B/L AC B/L  SC b/l  BE R only,   Deferred L  SC NV    Gr. II-IV pat " "mobs             B/L calf, h/s str.             Reassessment         GR                              Neuro Re-Ed             SAQ             TKE             Supine SLR 2x 10 b/l ecc control.  3x 10 ecc lowering b/l   3x10 B/L 2 x 10 B/L 2x 10 b/l  2x10 B/L 2x 10      Quad set with heel prop             Hurdles F and L       NV 2 laps ea dir  2 laps ea. VC's no UE                                 Ther Ex             Patient education: pathophysiology, pain management, walking program, HEP review       HEP, stretching, progress  GR    Recumbent bike Bike rocking to rotation  Bike 10 min with rotation  Bike 10 min Bike 10 min Bike 10 min Bike 10 min fwd / rocking Bike 10 min  Nustep L4 with LE only  Rec bike 10 min    HR             SB calf str.   Prostretch 3x30\" b/l Prostretch 3x30\" b/l np        LAQ NV 2.5# 3x10  Matrix: NV Matrix: 10# 1x10  1x5 np        Standing h/s curl 2x 10 b/l  2x 10  Matrix: NV Matrix: 10# 1x10  1x5 np        Heel slides Reviewed for HEP      5\" 10x ea.        Seated w/ opposite OP knee flexion stretch  10\"x 10 b/l                                                    Ther Activity             FSU 15x  b/l  6\" 2x 10 b/l  8\" 2x10 b/l with step rail 8\" 2x10 b/l with step rail 6\" 2 x 10 b/l         FSD 10x 4\" b/l             Sit to stands   2x 10     2x 10  2x10  2x 10 in lower chair no UE      Squats             Total gym: bilateral squats L22 2x 10 w/ stretch into flexion  L22 2x 10 w/ stretch into flexion  L22 3x10 L22  3x10 L22 2 x 10 L22 2x 10  L22 2x10 NV     5 Times Sit to Stand Test, TUG, Stair training             Gait Training                                       Modalities             CP                                                "

## 2024-02-14 NOTE — PROGRESS NOTES
PT Re-Evaluation     Today's date: 2024  Patient name: Agnieszka Camejo  : 1945  MRN: [id number]  Referring provider: Isak Breen DO  Dx:   Encounter Diagnosis     ICD-10-CM    1. S/P TKR (total knee replacement), right  Z96.651       2. S/P TKR (total knee replacement), left  Z96.652           Start Time: 1115  Stop Time: 1200  Total time in clinic (min): 45 minutes    Assessment  Assessment details: Agnieszka Camejo is a 78 y.o. female who presents with h/o chronic bilateral knee pain. Patient is currently s/p bilateral total knee arthroplasty (R DOS:  ; L DOS: ). Patient has attended a total of 18 physical therapy appointment dates, not including previous initial physical therapy following her right TKA which includes an additional 17 visits. Patient has maintained fair compliance with POC with inconsistent follow-through with prescribed HEP. Patient is reporting gradual reduction in pain, improved knee ROM, improved weightbearing tolerance, increased strength and improved quality of gait. Despite these improvements, Patient continues to present with significantly limited knee flexion ROM bilaterally (R > L) with poor tolerance to stretching. Patient with inconsistent performance of stretches at home and has been largely home bound. Patient's overall mobility has significantly improved as demonstrated by reduction in time needed to complete functional tasks. Patient reports her right knee overall level of function to be approximately 98% and her left knee 75%. Patient would benefit from skilled physical therapy to address their aforementioned impairments, improve their level of function and to improve their overall quality of life.  Impairments: abnormal gait, abnormal or restricted ROM, activity intolerance, impaired physical strength, lacks appropriate home exercise program, pain with function, weight-bearing intolerance and poor body mechanics  Understanding of Dx/Px/POC: excellent    Prognosis: good    Goals  Short Term Goals: to be achieved by 4 weeks  1) Patient to be independent with basic HEP. MET  2) Decrease pain by 5/10 at its worst. MET  3) Increase knee flexion ROM to 100 deg. PARTIALLY MET  4) Increase knee extension ROM by > 5 deg. MET  5) Increase LE strength by 1/2 MMT grade in all deficient planes. MET  6) Patient to negotiate steps with a step-to pattern with use of HR. MET  7) Patient to report decreased sleep interruption secondary to pain. MET  8) Increase ambulatory tolerance by 20 min with LRAD. MET    Long Term Goals: to be achieved by discharge ALL GOALS PROGRESSING  1) FOTO equal to or greater than 71.  2) Patient to be independent with comprehensive HEP.  3) Abolish pain for improved quality of life.  4) Increase LE strength to 5/5 MMT grade in all planes to improve a/iadls.  5) Achieve full knee extension ROM to improve a/iadls.  6) Increase knee flexion ROM to within 5 deg of contralateral LE to improve a/iadls.  7) Patient to negotiate steps with a reciprocal pattern with use of Hr.  8) Increase ambulatory tolerance to 60 min to improve participation in social activities.  9) Patient to report no sleep interruption secondary to pain.    Plan  Patient would benefit from: skilled PT  Planned modality interventions: biofeedback, cryotherapy, electrical stimulation/Russian stimulation, TENS and low level laser therapy  Planned therapy interventions: activity modification, ADL retraining, ADL training, balance, balance/weight bearing training, body mechanics training, dressing changes, functional ROM exercises, gait training, home exercise program, IADL retraining, joint mobilization, manual therapy, massage, neuromuscular re-education, patient education, self care, strengthening, stretching, therapeutic activities, therapeutic exercise and transfer training  Frequency: 1-3x week.  Duration in weeks: 6  Plan of Care beginning date: 2/14/2024  Plan of Care expiration date:  3/27/2024  Treatment plan discussed with: patient      Subjective Evaluation    History of Present Illness  Mechanism of injury: Patient currently estimates her right knee overall level of function to be approximately 98%. Patient's primary limitation in her right knee is her limited flexion ROM. Patient does not notice her limited ROM when performing functional activities. Patient does continue to have mild pain, but this is very manageable.     Patient estimates her left knee overall level of function to be approximately 75%. Patient continues to have intermittent pain and her swelling fluctuates. Patient is reporting improved ROM in her left knee compared to her right knee.     Patient's quality of sleep has significantly improved. Patient has been using her SPC at times and also walks independently. Patient has been pushing her ambulation to 600 ft and has walked up to a half hour. Patient remains in her ex 's home and has not transitioned back to her house yet.     Patient's next f/u appointment with her surgeon is scheduled on 3/8.  Patient Goals  Patient goal: decreased pain, improved activity tolerance, improve quality of gait  Pain  Pain scale: R: 0/10 ; L: 0/10.  Pain scale at lowest: R: 0/10 ; L: 0/10.  Pain scale at highest: R: 2/10 ; L: 4/10.  Location: left > right knee: diffuse  Quality: dull ache (strained, tired)    Social Support    Employment status: not working  Treatments  Current treatment: physical therapy      Objective     Observations   Left Knee   Positive for edema, effusion and incision (Clean, well-approximated, no signs of infection ; good scar tissue mobility).     Right Knee   Positive for edema, effusion and incision (good scar tissue mobility).     Active Range of Motion   Left Knee   Flexion: 98 degrees with pain  Extension: -5 degrees     Right Knee   Flexion: 85 degrees with pain  Extension: -3 degrees     Passive Range of Motion   Left Knee   Flexion: 102 degrees  "  Extension: -3 degrees     Right Knee   Flexion: 94 degrees with pain  Extension: 0 degrees with pain    Mobility   Patellar Mobility:   Left Knee   Hypomobile: left medial, left lateral, left superior and left inferior    Right Knee   Hypomobile: medial, lateral, superior and inferior     Strength/Myotome Testing     Left Hip   Planes of Motion   Flexion: 5    Right Hip   Planes of Motion   Flexion: 5    Left Knee   Flexion: 5  Extension: 5  Quadriceps contraction: fair    Right Knee   Flexion: 5  Extension: 5  Quadriceps contraction: fair    Left Ankle/Foot   Dorsiflexion: 5    Right Ankle/Foot   Dorsiflexion: 5    Tests   Left Ankle/Foot   Negative for Homans' sign.     Right Ankle/Foot   Negative for Homans' sign.     Ambulation   Weight-Bearing Status   Weight-Bearing Status (Left): full weight bearing   Weight-Bearing Status (Right): full weight-bearing      Ambulation: Level Surfaces   Ambulation with assistive device: independent  Ambulation without assistive device: independent    Observational Gait   Gait: antalgic   Decreased walking speed and stride length.     Functional Assessment      Squat    Left within functional limits and right within functional limits.     Comments  INITIAL EVALUATION (12/11/23)  Timed Up and Go (TUG): 45.10 sec, Mod I ambulation with RW and sit to/from stand with b/l arm rests  Five Times Sit to Stand Test: 37.40 sec, Mod I with b/l arm rests, good eccentric control, equal weightbearing    REEVALUATION (1/12/24):  Timed Up and Go (TUG): 11.21 sec, Independent ambulation, Mod I sit to/from stand with arm rests  Five Times Sit to Stand Test: 12.43 sec, Mod I with b/l arm rests, good eccentric control  FSU 6\" step: R: Mod I with b/l handrail ; L: Mod I with b/l handrail   FSD 6\" step: R: Mod I with b/l handrails, poor eccentric control ; L: Mod I with b/l handrails, poor eccentric control    REEVALUATION (2/14/24):  Timed Up and Go (TUG): 10.39 sec, Independent sit to/from " "stand  Five Times Sit to Stand Test: 13.58 sec, Independent, good eccentric control ; 10.21 sec, Mod I, good eccentric control  FSU 8\" step: Mod I, WFL bilaterally  FSD 8\" step: Mod I with single hand rail, fair eccentric control bilaterally               POC expires Unit limit Auth  expiration date PT/OT + Visit Limit?   3/27/24 N/A N/A BOMN                 Visit/Unit Tracking  AUTH Status:  Date 2/12 2/14 1/12 1/16 1/19 1/22 1/24 1/26 /29 1/31 2/2   N/A Used 13 14  4 5 6 7 8 9 10 11 12    Remaining                    Precautions: s/p L TKA (DOS: 12/7/23), R TKA (DOS: 9/25/23)      Manuals 1/19 1/22 1/24 1/26 1/29 1/31 2/2 2/12 2/14    B/L knee PROM SC flexion supine and seated   Extension (supine) SC  GR R only BE B/L AC B/L  SC b/l  BE R only,   Deferred L  SC NV    Gr. II-IV pat mobs             B/L calf, h/s str.             Reassessment         GR                              Neuro Re-Ed             SAQ             TKE             Supine SLR 2x 10 b/l ecc control.  3x 10 ecc lowering b/l   3x10 B/L 2 x 10 B/L 2x 10 b/l  2x10 B/L 2x 10      Quad set with heel prop             Hurdles F and L       NV 2 laps ea dir  2 laps ea. VC's no UE                                 Ther Ex             Patient education: pathophysiology, pain management, walking program, HEP review       HEP, stretching, progress  GR    Recumbent bike Bike rocking to rotation  Bike 10 min with rotation  Bike 10 min Bike 10 min Bike 10 min Bike 10 min fwd / rocking Bike 10 min  Nustep L4 with LE only  Rec bike 10 min    HR             SB calf str.   Prostretch 3x30\" b/l Prostretch 3x30\" b/l np        LAQ NV 2.5# 3x10  Matrix: NV Matrix: 10# 1x10  1x5 np        Standing h/s curl 2x 10 b/l  2x 10  Matrix: NV Matrix: 10# 1x10  1x5 np        Heel slides Reviewed for HEP      5\" 10x ea.        Seated w/ opposite OP knee flexion stretch  10\"x 10 b/l                                                    Ther Activity             FSU 15x  b/l  6\" 2x 10 " "b/l  8\" 2x10 b/l with step rail 8\" 2x10 b/l with step rail 6\" 2 x 10 b/l         FSD 10x 4\" b/l             Sit to stands   2x 10     2x 10  2x10  2x 10 in lower chair no UE      Squats             Total gym: bilateral squats L22 2x 10 w/ stretch into flexion  L22 2x 10 w/ stretch into flexion  L22 3x10 L22  3x10 L22 2 x 10 L22 2x 10  L22 2x10 NV     5 Times Sit to Stand Test, TUG, Stair training             Gait Training                                       Modalities             CP                                "

## 2024-02-19 ENCOUNTER — OFFICE VISIT (OUTPATIENT)
Dept: PHYSICAL THERAPY | Facility: CLINIC | Age: 79
End: 2024-02-19
Payer: MEDICARE

## 2024-02-19 DIAGNOSIS — Z96.652 S/P TKR (TOTAL KNEE REPLACEMENT), LEFT: ICD-10-CM

## 2024-02-19 DIAGNOSIS — Z96.651 S/P TKR (TOTAL KNEE REPLACEMENT), RIGHT: Primary | ICD-10-CM

## 2024-02-19 PROCEDURE — 97140 MANUAL THERAPY 1/> REGIONS: CPT

## 2024-02-19 PROCEDURE — 97110 THERAPEUTIC EXERCISES: CPT

## 2024-02-19 PROCEDURE — 97112 NEUROMUSCULAR REEDUCATION: CPT

## 2024-02-19 NOTE — PROGRESS NOTES
Daily Note     Today's date: 2024  Patient name: Agnieszka Camejo  : 1945  MRN: [id number]  Referring provider: Isak Breen DO  Dx:   Encounter Diagnosis     ICD-10-CM    1. S/P TKR (total knee replacement), right  Z96.651       2. S/P TKR (total knee replacement), left  Z96.652                      Subjective: Patient reports her knees are sore from exercising yesterday.       Objective: See treatment diary below      Assessment: Tolerated treatment well. Patient continues to have increased tightness with PROM on the right. Patient did require increased time in order to get on the bike and then additional time to be able to make a full rotation. Patient requires cues to increase knee flexion when performing hurdles due to patient not clearing marcia. Patient demonstrated fatigue post treatment, exhibited good technique with therapeutic exercises, and would benefit from continued PT      Plan: Continue per plan of care.      POC expires Unit limit Auth  expiration date PT/OT + Visit Limit?   3/27/24 N/A N/A BOMN                 Visit/Unit Tracking  AUTH Status:  Date  2/2   N/A Used 13 14 15 4 5 6 7 8 9 10 11 12    Remaining                    Precautions: s/p L TKA (DOS: 23), R TKA (DOS: 23)      Manuals  2/   B/L knee PROM SC flexion supine and seated   Extension (supine) SC  GR R only BE B/L AC B/L  SC b/l  BE R only,   Deferred L  SC NV DB   Gr. II-IV pat mobs             B/L calf, h/s str.             Reassessment         GR                              Neuro Re-Ed             SAQ             TKE             Supine SLR 2x 10 b/l ecc control.  3x 10 ecc lowering b/l   3x10 B/L 2 x 10 B/L 2x 10 b/l  2x10 B/L 2x 10   2  x10   Quad set with heel prop             Hurdles F and L       NV 2 laps ea dir  2 laps ea. VC's no UE    3 laps ea. VC's no UE                               Ther Ex          "    Patient education: pathophysiology, pain management, walking program, HEP review       HEP, stretching, progress  GR    Recumbent bike Bike rocking to rotation  Bike 10 min with rotation  Bike 10 min Bike 10 min Bike 10 min Bike 10 min fwd / rocking Bike 10 min  Nustep L4 with LE only  Rec bike 10 min Rec bike 15 min   HR             SB calf str.   Prostretch 3x30\" b/l Prostretch 3x30\" b/l np        LAQ NV 2.5# 3x10  Matrix: NV Matrix: 10# 1x10  1x5 np        Standing h/s curl 2x 10 b/l  2x 10  Matrix: NV Matrix: 10# 1x10  1x5 np        Heel slides Reviewed for HEP      5\" 10x ea.        Seated w/ opposite OP knee flexion stretch  10\"x 10 b/l                                                    Ther Activity             FSU 15x  b/l  6\" 2x 10 b/l  8\" 2x10 b/l with step rail 8\" 2x10 b/l with step rail 6\" 2 x 10 b/l         FSD 10x 4\" b/l             Sit to stands   2x 10     2x 10  2x10  2x 10 in lower chair no UE      Squats             Total gym: bilateral squats L22 2x 10 w/ stretch into flexion  L22 2x 10 w/ stretch into flexion  L22 3x10 L22  3x10 L22 2 x 10 L22 2x 10  L22 2x10 NV  L22 2 x 10    5 Times Sit to Stand Test, TUG, Stair training             Gait Training                                       Modalities             CP                                "

## 2024-02-23 ENCOUNTER — OFFICE VISIT (OUTPATIENT)
Dept: PHYSICAL THERAPY | Facility: CLINIC | Age: 79
End: 2024-02-23
Payer: MEDICARE

## 2024-02-23 DIAGNOSIS — Z96.651 S/P TKR (TOTAL KNEE REPLACEMENT), RIGHT: Primary | ICD-10-CM

## 2024-02-23 DIAGNOSIS — Z96.652 S/P TKR (TOTAL KNEE REPLACEMENT), LEFT: ICD-10-CM

## 2024-02-23 PROCEDURE — 97530 THERAPEUTIC ACTIVITIES: CPT | Performed by: PHYSICAL THERAPIST

## 2024-02-23 PROCEDURE — 97112 NEUROMUSCULAR REEDUCATION: CPT | Performed by: PHYSICAL THERAPIST

## 2024-02-23 PROCEDURE — 97140 MANUAL THERAPY 1/> REGIONS: CPT | Performed by: PHYSICAL THERAPIST

## 2024-02-23 PROCEDURE — 97110 THERAPEUTIC EXERCISES: CPT | Performed by: PHYSICAL THERAPIST

## 2024-02-23 NOTE — PROGRESS NOTES
Daily Note     Today's date: 2024  Patient name: Agnieszka Camejo  : 1945  MRN: [id number]  Referring provider: Isak Breen DO  Dx:   Encounter Diagnosis     ICD-10-CM    1. S/P TKR (total knee replacement), right  Z96.651       2. S/P TKR (total knee replacement), left  Z96.652           Start Time: 1058  Stop Time: 1205  Total time in clinic (min): 67 minutes    Subjective: Patient reports that she has been trying to stretch her knees as able. Patient avoid descending steps with her left leg.      Objective: See treatment diary below      Assessment: Tolerated treatment fair. Patient demonstrated fatigue post treatment and would benefit from continued PT. Patient with improved left knee flexion ROM, however, right knee remains at 90 deg. Patient with increased pain with light PROM. Patient with fair eccentric control descending steps with her left.      Plan: Continue per plan of care.  Progress treatment as tolerated.       POC expires Unit limit Auth  expiration date PT/OT + Visit Limit?   3/27/24 N/A N/A BOMN                 Visit/Unit Tracking  AUTH Status:  Date  2/2   N/A Used 13 14 15 16  6 7 8 9 10 11 12    Remaining                    Precautions: s/p L TKA (DOS: 23), R TKA (DOS: 23)      Manuals  2/   B/L knee PROM GR  GR R only BE B/L AC B/L  SC b/l  BE R only,   Deferred L  SC NV DB   Gr. II-IV pat mobs             B/L calf, h/s str.             Reassessment         GR                              Neuro Re-Ed             SAQ             TKE             Supine SLR    3x10 B/L 2 x 10 B/L 2x 10 b/l  2x10 B/L 2x 10   2  x10   Quad set with heel prop             Hurdles F and L  2 laps ea.     NV 2 laps ea dir  2 laps ea. VC's no UE    3 laps ea. VC's no UE                               Ther Ex             Patient education: pathophysiology, pain management, walking program, HEP review        "HEP, stretching, progress  GR    Recumbent bike Upright bike 10 min  Bike 10 min Bike 10 min Bike 10 min Bike 10 min fwd / rocking Bike 10 min  Nustep L4 with LE only  Rec bike 10 min Rec bike 15 min   HR             SB calf str.   Prostretch 3x30\" b/l Prostretch 3x30\" b/l np        LAQ   Matrix: NV Matrix: 10# 1x10  1x5 np        Standing h/s curl   Matrix: NV Matrix: 10# 1x10  1x5 np        Heel slides      5\" 10x ea.        Seated w/ opposite OP knee flexion stretch                                                     Ther Activity             FSU   8\" 2x10 b/l with step rail 8\" 2x10 b/l with step rail 6\" 2 x 10 b/l         FSD             Sit to stands  10# 2x10     2x 10  2x10  2x 10 in lower chair no UE      Squats             Total gym: bilateral squats L22 2x10  L22 3x10 L22  3x10 L22 2 x 10 L22 2x 10  L22 2x10 NV  L22 2 x 10    5 Times Sit to Stand Test, TUG, Stair training             Gait Training                                       Modalities             CP                                  "

## 2024-02-26 ENCOUNTER — OFFICE VISIT (OUTPATIENT)
Dept: PHYSICAL THERAPY | Facility: CLINIC | Age: 79
End: 2024-02-26
Payer: MEDICARE

## 2024-02-26 DIAGNOSIS — Z96.651 S/P TKR (TOTAL KNEE REPLACEMENT), RIGHT: Primary | ICD-10-CM

## 2024-02-26 DIAGNOSIS — Z96.652 S/P TKR (TOTAL KNEE REPLACEMENT), LEFT: ICD-10-CM

## 2024-02-26 PROCEDURE — 97140 MANUAL THERAPY 1/> REGIONS: CPT

## 2024-02-26 PROCEDURE — 97110 THERAPEUTIC EXERCISES: CPT

## 2024-02-26 PROCEDURE — 97530 THERAPEUTIC ACTIVITIES: CPT

## 2024-02-26 NOTE — PROGRESS NOTES
Daily Note     Today's date: 2024  Patient name: Agnieszka Camejo  : 1945  MRN: [id number]  Referring provider: Isak Breen DO  Dx:   Encounter Diagnosis     ICD-10-CM    1. S/P TKR (total knee replacement), right  Z96.651       2. S/P TKR (total knee replacement), left  Z96.652           Start Time: 1115  Stop Time: 1215  Total time in clinic (min): 60 minutes    Subjective: pt noted that after Friday she was hurting and wasn't able to stretch much due to having increase soreness and pain.       Objective: See treatment diary below      Assessment:  Continued with treatment session with focus on b/l Knees ROM as well as strengthening. Pt was able to complete step downs with verbal cues for technique. Tolerated treatment well.  Noted feeling less tightness as therapy session continued this visit. Patient exhibited good technique with therapeutic exercises and would benefit from continued PT.     Advised she may have some DOMS s/p treatment session.       Plan: Continue per plan of care.      POC expires Unit limit Auth  expiration date PT/OT + Visit Limit?   3/27/24 N/A N/A BOMN                 Visit/Unit Tracking  AUTH Status:  Date  2/   N/A Used 13 14 15 16 17  7 8 9 10 11 12    Remaining                    Precautions: s/p L TKA (DOS: 23), R TKA (DOS: 23)      Manuals  2/2    B/L knee PROM GR SC  BE B/L AC B/L  SC b/l  BE R only,   Deferred L  SC NV DB   Gr. II-IV pat mobs             B/L calf, h/s str.             Reassessment         GR                              Neuro Re-Ed             SAQ             TKE             Supine SLR  3x 10 b/l   3x10 B/L 2 x 10 B/L 2x 10 b/l  2x10 B/L 2x 10   2  x10   Quad set with heel prop             Hurdles F and L  2 laps ea. NV    NV 2 laps ea dir  2 laps ea. VC's no UE    3 laps ea. VC's no UE                               Ther Ex             Patient  "education: pathophysiology, pain management, walking program, HEP review       HEP, stretching, progress  GR    Recumbent bike Upright bike 10 min Rec bike 10 min   Bike 10 min Bike 10 min Bike 10 min fwd / rocking Bike 10 min  Nustep L4 with LE only  Rec bike 10 min Rec bike 15 min   HR             SB calf str.    Prostretch 3x30\" b/l np        LAQ    Matrix: 10# 1x10  1x5 np        Standing h/s curl    Matrix: 10# 1x10  1x5 np        Heel slides      5\" 10x ea.        Seated w/ opposite OP knee flexion stretch                                                     Ther Activity             FSU    8\" 2x10 b/l with step rail 6\" 2 x 10 b/l         FSD  6\" 2x 10 b/l UE support.            Sit to stands  10# 2x10 10# 3x 10     2x 10  2x10  2x 10 in lower chair no UE      Squats             Total gym: bilateral squats L22 2x10 L22 3x 10   L22  3x10 L22 2 x 10 L22 2x 10  L22 2x10 NV  L22 2 x 10    5 Times Sit to Stand Test, TUG, Stair training             Gait Training                                       Modalities             CP                                    "

## 2024-02-29 ENCOUNTER — OFFICE VISIT (OUTPATIENT)
Dept: PHYSICAL THERAPY | Facility: CLINIC | Age: 79
End: 2024-02-29
Payer: MEDICARE

## 2024-02-29 DIAGNOSIS — Z96.651 S/P TKR (TOTAL KNEE REPLACEMENT), RIGHT: Primary | ICD-10-CM

## 2024-02-29 DIAGNOSIS — Z96.652 S/P TKR (TOTAL KNEE REPLACEMENT), LEFT: ICD-10-CM

## 2024-02-29 PROCEDURE — 97140 MANUAL THERAPY 1/> REGIONS: CPT

## 2024-02-29 PROCEDURE — 97112 NEUROMUSCULAR REEDUCATION: CPT

## 2024-02-29 PROCEDURE — 97530 THERAPEUTIC ACTIVITIES: CPT

## 2024-02-29 PROCEDURE — 97110 THERAPEUTIC EXERCISES: CPT

## 2024-03-04 ENCOUNTER — OFFICE VISIT (OUTPATIENT)
Dept: PHYSICAL THERAPY | Facility: CLINIC | Age: 79
End: 2024-03-04
Payer: MEDICARE

## 2024-03-04 DIAGNOSIS — Z96.651 S/P TKR (TOTAL KNEE REPLACEMENT), RIGHT: Primary | ICD-10-CM

## 2024-03-04 DIAGNOSIS — Z96.652 S/P TKR (TOTAL KNEE REPLACEMENT), LEFT: ICD-10-CM

## 2024-03-04 PROCEDURE — 97112 NEUROMUSCULAR REEDUCATION: CPT

## 2024-03-04 PROCEDURE — 97110 THERAPEUTIC EXERCISES: CPT

## 2024-03-04 PROCEDURE — 97140 MANUAL THERAPY 1/> REGIONS: CPT

## 2024-03-04 PROCEDURE — 97530 THERAPEUTIC ACTIVITIES: CPT

## 2024-03-04 NOTE — PROGRESS NOTES
"Daily Note     Today's date: 3/4/2024  Patient name: Agnieszka Camejo  : 1945  MRN: [id number]  Referring provider: Isak Breen DO  Dx:   Encounter Diagnosis     ICD-10-CM    1. S/P TKR (total knee replacement), right  Z96.651       2. S/P TKR (total knee replacement), left  Z96.652           Start Time: 1104  Stop Time: 1204  Total time in clinic (min): 60 minutes    Subjective: Pt noted that her calf discomfort has since went away since Friday. She noted that she has been using the \"glider chair\" to help stretch her knee and reported that she feels like it is moving better now into flexion.       Objective: See treatment diary below  PROM flexion R knee: 97* and L 105*      Assessment:  Continued with treatment session with focus on R and L knee s/p TKA. Presented with decreased p! With all exercises.  Reported increase tightness in L HS with L extension PROM was unable to tolerate at this time. Therefore transitioned to LLLD stretch with heel prop for LLE.  Tolerated treatment well. Patient exhibited good technique with therapeutic exercises and would benefit from continued PT.   S/p treatment session noted feeling good and having no additional pains noted.     Plan: Continue per plan of care.      POC expires Unit limit Auth  expiration date PT/OT + Visit Limit?   3/27/24 N/A N/A BOMN                 Visit/Unit Tracking  AUTH Status:  Date  3//   N/A Used 13 14 15 16 17 18 19  9 10 11 12    Remaining                    Precautions: s/p L TKA (DOS: 23), R TKA (DOS: 23)      Manuals  3/   B/L knee PROM GR SC SC SC hold on L / p!.   SC b/l  BE R only,   Deferred L  SC NV DB   Gr. II-IV pat mobs             B/L calf, h/s str.             Reassessment         GR                              Neuro Re-Ed             SAQ             TKE             Supine SLR  3x 10 b/l  3x 10 b/l  3x 10 b/l   2x 10 b/l  2x10 " "B/L 2x 10   2  x10   Quad set with heel prop             Hurdles F and L  2 laps ea. NV  2 laps   NV 2 laps ea dir  2 laps ea. VC's no UE    3 laps ea. VC's no UE                               Ther Ex             Patient education: pathophysiology, pain management, walking program, HEP review       HEP, stretching, progress  GR    Recumbent bike Upright bike 10 min Rec bike 10 min  Rec bike 10 min  Rec bike no tension 10 min fwd   Bike 10 min fwd / rocking Bike 10 min  Nustep L4 with LE only  Rec bike 10 min Rec bike 15 min   HR             SB calf str.             LAQ             Standing h/s curl             Heel slides      5\" 10x ea.        Seated w/ opposite OP knee flexion stretch              Hip abd    2x 10  b/l  Reviewed                                    Ther Activity             FSU             FSD  6\" 2x 10 b/l UE support.  6\" 10x u/l UE support.  6\" 10x u/L UE support.  Up and down          Sit to stands  10# 2x10 10# 3x 10  10# 3x 10  10# 3x 10   2x 10  2x10  2x 10 in lower chair no UE      Squats             Total gym: bilateral squats L22 2x10 L22 3x 10  L22 3x 10  L22 3x 10   L22 2x 10  L22 2x10 NV  L22 2 x 10    5 Times Sit to Stand Test, TUG, Stair training             Gait Training                                       Modalities             CP                                        "

## 2024-03-07 ENCOUNTER — OFFICE VISIT (OUTPATIENT)
Dept: PHYSICAL THERAPY | Facility: CLINIC | Age: 79
End: 2024-03-07
Payer: MEDICARE

## 2024-03-07 DIAGNOSIS — Z96.652 S/P TKR (TOTAL KNEE REPLACEMENT), LEFT: ICD-10-CM

## 2024-03-07 DIAGNOSIS — Z96.651 S/P TKR (TOTAL KNEE REPLACEMENT), RIGHT: Primary | ICD-10-CM

## 2024-03-07 PROCEDURE — 97110 THERAPEUTIC EXERCISES: CPT | Performed by: PHYSICAL THERAPIST

## 2024-03-07 PROCEDURE — 97140 MANUAL THERAPY 1/> REGIONS: CPT | Performed by: PHYSICAL THERAPIST

## 2024-03-07 NOTE — PROGRESS NOTES
Daily Note     Today's date: 3/7/2024  Patient name: Agnieszka Camejo  : 1945  MRN: [id number]  Referring provider: Isak Breen DO  Dx:   Encounter Diagnosis     ICD-10-CM    1. S/P TKR (total knee replacement), right  Z96.651       2. S/P TKR (total knee replacement), left  Z96.652           Start Time: 1056  Stop Time: 1210  Total time in clinic (min): 74 minutes    Subjective: Patient reports that she had a f/u appointment with her surgeon who told her that her knees are stiff. Patient is in more pain today because she overdid it yesterday by stretching in her glider for 45 minutes, going up her stairs and walking to the mailbox.       Objective: See treatment diary below      Assessment: Tolerated treatment well. Patient demonstrated fatigue post treatment and would benefit from continued PT. Patient remains very painful to gentle knee PROM with tendency to guard.       Plan: Continue per plan of care.  Progress treatment as tolerated.       POC expires Unit limit Auth  expiration date PT/OT + Visit Limit?   3/27/24 N/A N/A BOMN                 Visit/Unit Tracking  AUTH Status:  Date 2/12 2/14 2/19 2/23 2/26 2/29 3/4 3/7  /29 1/31 2/2   N/A Used 13 14 15 16 17 18 19 20  10 11 12    Remaining                    Precautions: s/p L TKA (DOS: 23), R TKA (DOS: 23)      Manuals  3/4 3/7  2/2 2/12 2/14 2/19   B/L knee PROM GR SC SC SC hold on L / p!.  GR flexion only  BE R only,   Deferred L  SC NV DB   Gr. II-IV pat mobs             B/L calf, h/s str.             Reassessment         GR                              Neuro Re-Ed             SAQ             TKE             Supine SLR  3x 10 b/l  3x 10 b/l  3x 10 b/l    2x10 B/L 2x 10   2  x10   Quad set with heel prop             Hurdles F and L  2 laps ea. NV  2 laps    2 laps ea dir  2 laps ea. VC's no UE    3 laps ea. VC's no UE                               Ther Ex             Patient education: pathophysiology, pain management,  "walking program, HEP review       HEP, stretching, progress  GR    Recumbent bike Upright bike 10 min Rec bike 10 min  Rec bike 10 min  Rec bike no tension 10 min fwd  Rec bike 10 min  Bike 10 min  Nustep L4 with LE only  Rec bike 10 min Rec bike 15 min   HR             SB calf str.             LAQ             Standing h/s curl             Heel slides             Seated w/ opposite OP knee flexion stretch              Hip abd    2x 10  b/l  Reviewed          Prone hang     6 min        Prone hip flexor str.     3x30\" b/l        Ther Activity             FSU             FSD  6\" 2x 10 b/l UE support.  6\" 10x u/l UE support.  6\" 10x u/L UE support.  Up and down          Sit to stands  10# 2x10 10# 3x 10  10# 3x 10  10# 3x 10    2x10  2x 10 in lower chair no UE      Squats             Total gym: bilateral squats L22 2x10 L22 3x 10  L22 3x 10  L22 3x 10  L22 3x10  L22 2x10 NV  L22 2 x 10    5 Times Sit to Stand Test, TUG, Stair training             Gait Training                                       Modalities             CP                                          "

## 2024-03-11 ENCOUNTER — OFFICE VISIT (OUTPATIENT)
Dept: PHYSICAL THERAPY | Facility: CLINIC | Age: 79
End: 2024-03-11
Payer: MEDICARE

## 2024-03-11 DIAGNOSIS — Z96.652 S/P TKR (TOTAL KNEE REPLACEMENT), LEFT: ICD-10-CM

## 2024-03-11 DIAGNOSIS — Z96.651 S/P TKR (TOTAL KNEE REPLACEMENT), RIGHT: Primary | ICD-10-CM

## 2024-03-11 PROCEDURE — 97140 MANUAL THERAPY 1/> REGIONS: CPT | Performed by: PHYSICAL THERAPIST

## 2024-03-11 PROCEDURE — 97530 THERAPEUTIC ACTIVITIES: CPT | Performed by: PHYSICAL THERAPIST

## 2024-03-11 PROCEDURE — 97110 THERAPEUTIC EXERCISES: CPT | Performed by: PHYSICAL THERAPIST

## 2024-03-11 NOTE — PROGRESS NOTES
Daily Note     Today's date: 3/11/2024  Patient name: Agnieszka Camejo  : 1945  MRN: [id number]  Referring provider: Isak Breen DO  Dx:   Encounter Diagnosis     ICD-10-CM    1. S/P TKR (total knee replacement), right  Z96.651       2. S/P TKR (total knee replacement), left  Z96.652           Start Time: 1115  Stop Time: 1215  Total time in clinic (min): 60 minutes    Subjective: Patient reports that she would like to stop using her cane as she doesn't think she needs it anymore.      Objective: See treatment diary below      Assessment: Tolerated treatment fair. Patient demonstrated fatigue post treatment and would benefit from continued PT. No significant changes to overall status since last visit. Patient challenged with eccentric control descending steps.      Plan: Continue per plan of care.  Progress treatment as tolerated.       POC expires Unit limit Auth  expiration date PT/OT + Visit Limit?   3/27/24 N/A N/A BOMN                 Visit/Unit Tracking  AUTH Status:  Date  3/4 3/7 3/1  1/31 2/2   N/A Used 13 14 15 16 17 18 19 20 21  11 12    Remaining                    Precautions: s/p L TKA (DOS: 23), R TKA (DOS: 23)      Manuals  3 3/7 3/11  2/12 2/14 2/19   B/L knee PROM GR SC SC SC hold on L / p!.  GR flexion only GR  SC NV DB   Gr. II-IV pat mobs             B/L calf, h/s str.             Reassessment         GR                              Neuro Re-Ed             SAQ             TKE             Supine SLR  3x 10 b/l  3x 10 b/l  3x 10 b/l     2x 10   2  x10   Quad set with heel prop             Hurdles F and L  2 laps ea. NV  2 laps     2 laps ea. VC's no UE    3 laps ea. VC's no UE                               Ther Ex             Patient education: pathophysiology, pain management, walking program, HEP review         GR    Recumbent bike Upright bike 10 min Rec bike 10 min  Rec bike 10 min  Rec bike no tension 10 min fwd  Rec bike 10  "min Upright bike 10 min  Nustep L4 with LE only  Rec bike 10 min Rec bike 15 min   HR             SB calf str.             LAQ      2# 3x10 b/l       Standing h/s curl      2# 2x10 b/l       Heel slides             Seated w/ opposite OP knee flexion stretch              Hip abd    2x 10  b/l  Reviewed          Prone hang     6 min        Prone hip flexor str.     3x30\" b/l 3x30\" b/l       Ther Activity             FSU             FSD  6\" 2x 10 b/l UE support.  6\" 10x u/l UE support.  6\" 10x u/L UE support.  Up and down   6\" x10 b/l       Sit to stands  10# 2x10 10# 3x 10  10# 3x 10  10# 3x 10     2x 10 in lower chair no UE      Squats             Total gym: bilateral squats L22 2x10 L22 3x 10  L22 3x 10  L22 3x 10  L22 3x10 L22 3x10  NV  L22 2 x 10    5 Times Sit to Stand Test, TUG, Stair training             LSD      4\" x15 b/l       Gait Training                                       Modalities             CP                                            "

## 2024-03-14 ENCOUNTER — EVALUATION (OUTPATIENT)
Dept: PHYSICAL THERAPY | Facility: CLINIC | Age: 79
End: 2024-03-14
Payer: MEDICARE

## 2024-03-14 DIAGNOSIS — Z96.651 S/P TKR (TOTAL KNEE REPLACEMENT), RIGHT: Primary | ICD-10-CM

## 2024-03-14 DIAGNOSIS — Z96.652 S/P TKR (TOTAL KNEE REPLACEMENT), LEFT: ICD-10-CM

## 2024-03-14 PROCEDURE — 97530 THERAPEUTIC ACTIVITIES: CPT | Performed by: PHYSICAL THERAPIST

## 2024-03-14 PROCEDURE — 97110 THERAPEUTIC EXERCISES: CPT | Performed by: PHYSICAL THERAPIST

## 2024-03-14 NOTE — LETTER
2024    Isak Breen DO  [address]  [address]  [facility] [address]    Patient: Agnieszka Camejo   YOB: 1945   Date of Visit: 3/14/2024     Encounter Diagnosis     ICD-10-CM    1. S/P TKR (total knee replacement), right  Z96.651       2. S/P TKR (total knee replacement), left  Z96.652           Dear Dr. Breen:    Thank you for your recent referral of Agnieszka Camejo. Please review the attached evaluation summary from Agnieszka's recent visit.     Please verify that you agree with the plan of care by signing the attached order.     If you have any questions or concerns, please do not hesitate to call.     I sincerely appreciate the opportunity to share in the care of one of your patients and hope to have another opportunity to work with you in the near future.       Sincerely,    Etienne Chen, PT      Referring Provider:      I certify that I have read the below Plan of Care and certify the need for these services furnished under this plan of treatment while under my care.                    Isak Breen DO  [address]  [address]  [facility] [address]  Via Fax: [phone]          PT Re-Evaluation  and PT Discharge    Today's date: 3/14/2024  Patient name: Agnieszka Camejo  : 1945  MRN: [id number]  Referring provider: Isak Breen DO  Dx:   Encounter Diagnosis     ICD-10-CM    1. S/P TKR (total knee replacement), right  Z96.651       2. S/P TKR (total knee replacement), left  Z96.652           Start Time: 1115  Stop Time: 1215  Total time in clinic (min): 60 minutes    Assessment  Assessment details: Agnieszka Camejo is a 78 y.o. female who presents with h/o chronic bilateral knee pain. Patient is currently s/p bilateral total knee arthroplasty (R DOS:  ; L DOS: ). Patient has attended a total of 27 physical therapy appointment dates, not including previous initial physical therapy following her right TKA which includes an additional 17 visits.  Patient is reporting gradual reduction in pain, improved knee ROM, improved weightbearing tolerance, increased strength and improved quality of gait. Despite these improvements, Patient continues to present with limited knee flexion ROM bilaterally (R > L) with poor tolerance to stretching. Patient's overall mobility has significantly improved as demonstrated by reduction in time needed to complete functional tasks. Patient continues to use SPC as needed, but is feeling more confident without it. Patient reports her right knee overall level of function to be approximately 90-95% and her left knee 80-85%. Patient is independent with her comprehensive HEP and will be moving back home in [place]. Patient is requesting discharge at this time. Patient has been instructed to contact PT if she notices a decline in function or if she has any questions/concerns. Patient will be discharged at this time.  Impairments: abnormal gait, abnormal or restricted ROM, activity intolerance, impaired physical strength, lacks appropriate home exercise program, pain with function, weight-bearing intolerance and poor body mechanics  Understanding of Dx/Px/POC: excellent   Prognosis: good    Goals  Short Term Goals: to be achieved by 4 weeks  1) Patient to be independent with basic HEP. MET  2) Decrease pain by 5/10 at its worst. MET  3) Increase knee flexion ROM to 100 deg. PARTIALLY MET  4) Increase knee extension ROM by > 5 deg. MET  5) Increase LE strength by 1/2 MMT grade in all deficient planes. MET  6) Patient to negotiate steps with a step-to pattern with use of HR. MET  7) Patient to report decreased sleep interruption secondary to pain. MET  8) Increase ambulatory tolerance by 20 min with LRAD. MET    Long Term Goals: to be achieved by discharge   1) FOTO equal to or greater than 71. PROGRESSED, BUT NOT MET  2) Patient to be independent with comprehensive HEP. MET  3) Abolish pain for improved quality of life. PROGRESSED, BUT  NOT MET  4) Increase LE strength to 5/5 MMT grade in all planes to improve a/iadls. MET  5) Achieve full knee extension ROM to improve a/iadls. PROGRESSED, BUT NOT MET  6) Increase knee flexion ROM to within 5 deg of contralateral LE to improve a/iadls. PROGRESSED, BUT NOT MET  7) Patient to negotiate steps with a reciprocal pattern with use of Hr. PROGRESSED, BUT NOT MET  8) Increase ambulatory tolerance to 60 min to improve participation in social activities. PROGRESSED, BUT NOT MET  9) Patient to report no sleep interruption secondary to pain. PROGRESSED, BUT NOT MET    Plan  Patient would benefit from: skilled PT  Planned modality interventions: biofeedback, cryotherapy, electrical stimulation/Russian stimulation, TENS and low level laser therapy  Planned therapy interventions: home exercise program  Duration in visits: 1  Duration in weeks: 1  Plan of Care beginning date: 3/14/2024  Plan of Care expiration date: 3/14/2024  Treatment plan discussed with: patient      Subjective Evaluation    History of Present Illness  Mechanism of injury: Patient currently estimates her right knee overall level of function to be approximately 90-95%. Patient's primary limitation in her right knee continues to be limited flexion ROM. Patient does not notice her limited ROM when performing functional activities. Patient does continue to have mild pain, but this is very manageable.     Patient estimates her left knee overall level of function to be approximately 80-85%. Patient continues to have intermittent pain and weakness. Patient finds that her left knee fatigues more quickly than her right. Patient is reporting improved ROM in her left knee compared to her right knee.     Patient's quality of sleep has returned to normal. Patient has been using her SPC at times and also walks independently. Patient has been pushing her ambulation and is able to walk at least 45 minutes prior to resting. Patient is able to walk up hills with  less difficulty. Patient remains in her ex 's home and will be transitioning back to her house on 3/17.   Patient Goals  Patient goal: decreased pain, improved activity tolerance, improve quality of gait  Pain  Pain scale: R: 0/10 ; L: 0/10.  Pain scale at lowest: R: 0/10 ; L: 0/10.  Pain scale at highest: R: 1/10 ; L: 4/10.  Location: left > right knee: diffuse  Quality: dull ache (strained, tired)    Social Support    Employment status: not working  Treatments  Current treatment: physical therapy      Objective     Observations   Left Knee   Positive for effusion and incision (fair scar tissue restriction). Negative for edema.     Right Knee   Positive for effusion and incision (good scar tissue mobility). Negative for edema.     Active Range of Motion   Left Knee   Flexion: 100 degrees   Extension: -5 degrees     Right Knee   Flexion: 85 degrees with pain  Extension: -3 degrees     Passive Range of Motion   Left Knee   Flexion: 105 degrees   Extension: -3 degrees     Right Knee   Flexion: 90 degrees with pain  Extension: 0 degrees with pain    Mobility   Patellar Mobility:   Left Knee   Hypomobile: left medial, left lateral, left superior and left inferior    Right Knee   Hypomobile: medial, lateral, superior and inferior     Strength/Myotome Testing     Left Hip   Planes of Motion   Flexion: 5    Right Hip   Planes of Motion   Flexion: 5    Left Knee   Flexion: 5  Extension: 5  Quadriceps contraction: fair    Right Knee   Flexion: 5  Extension: 5  Quadriceps contraction: fair    Left Ankle/Foot   Dorsiflexion: 5    Right Ankle/Foot   Dorsiflexion: 5    Tests   Left Ankle/Foot   Negative for Homans' sign.     Right Ankle/Foot   Negative for Homans' sign.     Ambulation   Weight-Bearing Status   Weight-Bearing Status (Left): full weight bearing   Weight-Bearing Status (Right): full weight-bearing      Ambulation: Level Surfaces   Ambulation with assistive device: independent  Ambulation without assistive  "device: independent    Observational Gait   Gait: antalgic   Decreased walking speed and stride length.     Functional Assessment      Squat    Left within functional limits and right within functional limits.     Comments  INITIAL EVALUATION (12/11/23)  Timed Up and Go (TUG): 45.10 sec, Mod I ambulation with RW and sit to/from stand with b/l arm rests  Five Times Sit to Stand Test: 37.40 sec, Mod I with b/l arm rests, good eccentric control, equal weightbearing    REEVALUATION (1/12/24):  Timed Up and Go (TUG): 11.21 sec, Independent ambulation, Mod I sit to/from stand with arm rests  Five Times Sit to Stand Test: 12.43 sec, Mod I with b/l arm rests, good eccentric control  FSU 6\" step: R: Mod I with b/l handrail ; L: Mod I with b/l handrail   FSD 6\" step: R: Mod I with b/l handrails, poor eccentric control ; L: Mod I with b/l handrails, poor eccentric control    REEVALUATION (2/14/24):  Timed Up and Go (TUG): 10.39 sec, Independent sit to/from stand  Five Times Sit to Stand Test: 13.58 sec, Independent, good eccentric control ; 10.21 sec, Mod I, good eccentric control  FSU 8\" step: Mod I, WFL bilaterally  FSD 8\" step: Mod I with single hand rail, fair eccentric control bilaterally    REEVALUATION (3/14/24):  Timed Up and Go (TUG): 9.77 sec, Independent ambulation, Mod I sit to/from stand with b/l arm rests  Five Times Sit to Stand Test: 10.56 sec, Independent, good eccentric control  FSU 8\" step: Mod I, WFL bilaterally  FSD 8\" step: Mod I with single hand rail, fair eccentric control bilaterally      Flowsheet Rows      Flowsheet Row Most Recent Value   PT/OT G-Codes    Current Score 64   Projected Score 71                 POC expires Unit limit Auth  expiration date PT/OT + Visit Limit?   4/15/24 N/A N/A BOMN                 Visit/Unit Tracking  AUTH Status:  Date 2/12 2/14 2/19 2/23 2/26 2/29 3/4 3/7 3/1 3/14  2/2   N/A Used 13 14 15 16 17 18 19 20 21 22  12    Remaining                    Precautions: s/p L TKA " "(DOS: 12/7/23), R TKA (DOS: 9/25/23)      Manuals 2/23 2/26 2/29 3/4 3/7 3/11 3/14  2/14 2/19   B/L knee PROM GR SC SC SC hold on L / p!.  GR flexion only GR   NV DB   Gr. II-IV pat mobs             B/L calf, h/s str.             Reassessment       GR  GR                              Neuro Re-Ed             SAQ             TKE             Supine SLR  3x 10 b/l  3x 10 b/l  3x 10 b/l       2  x10   Quad set with heel prop             Hurdles F and L  2 laps ea. NV  2 laps       3 laps ea. VC's no UE                               Ther Ex             Patient education: pathophysiology, pain management, walking program, HEP review       GR  GR    Recumbent bike Upright bike 10 min Rec bike 10 min  Rec bike 10 min  Rec bike no tension 10 min fwd  Rec bike 10 min Upright bike 10 min Rec bike 10 min  Rec bike 10 min Rec bike 15 min   HR             SB calf str.             LAQ      2# 3x10 b/l       Standing h/s curl      2# 2x10 b/l       Heel slides             Seated w/ opposite OP knee flexion stretch              Hip abd    2x 10  b/l  Reviewed          Prone hang     6 min        Prone hip flexor str.     3x30\" b/l 3x30\" b/l       Ther Activity             FSU             FSD  6\" 2x 10 b/l UE support.  6\" 10x u/l UE support.  6\" 10x u/L UE support.  Up and down   6\" x10 b/l       Sit to stands  10# 2x10 10# 3x 10  10# 3x 10  10# 3x 10          Squats             Total gym: bilateral squats L22 2x10 L22 3x 10  L22 3x 10  L22 3x 10  L22 3x10 L22 3x10    L22 2 x 10    5 Times Sit to Stand Test, TUG, Stair training       Performed      LSD      4\" x15 b/l       Gait Training                                       Modalities             CP                                                "

## 2024-03-14 NOTE — PROGRESS NOTES
PT Re-Evaluation  and PT Discharge    Today's date: 3/14/2024  Patient name: Agnieszka Camejo  : 1945  MRN: [id number]  Referring provider: Isak Breen DO  Dx:   Encounter Diagnosis     ICD-10-CM    1. S/P TKR (total knee replacement), right  Z96.651       2. S/P TKR (total knee replacement), left  Z96.652           Start Time: 1115  Stop Time: 1215  Total time in clinic (min): 60 minutes    Assessment  Assessment details: Agnieszka Camejo is a 78 y.o. female who presents with h/o chronic bilateral knee pain. Patient is currently s/p bilateral total knee arthroplasty (R DOS:  ; L DOS: ). Patient has attended a total of 27 physical therapy appointment dates, not including previous initial physical therapy following her right TKA which includes an additional 17 visits. Patient is reporting gradual reduction in pain, improved knee ROM, improved weightbearing tolerance, increased strength and improved quality of gait. Despite these improvements, Patient continues to present with limited knee flexion ROM bilaterally (R > L) with poor tolerance to stretching. Patient's overall mobility has significantly improved as demonstrated by reduction in time needed to complete functional tasks. Patient continues to use SPC as needed, but is feeling more confident without it. Patient reports her right knee overall level of function to be approximately 90-95% and her left knee 80-85%. Patient is independent with her comprehensive HEP and will be moving back home in [place]. Patient is requesting discharge at this time. Patient has been instructed to contact PT if she notices a decline in function or if she has any questions/concerns. Patient will be discharged at this time.  Impairments: abnormal gait, abnormal or restricted ROM, activity intolerance, impaired physical strength, lacks appropriate home exercise program, pain with function, weight-bearing intolerance and poor body mechanics  Understanding of  Dx/Px/POC: excellent   Prognosis: good    Goals  Short Term Goals: to be achieved by 4 weeks  1) Patient to be independent with basic HEP. MET  2) Decrease pain by 5/10 at its worst. MET  3) Increase knee flexion ROM to 100 deg. PARTIALLY MET  4) Increase knee extension ROM by > 5 deg. MET  5) Increase LE strength by 1/2 MMT grade in all deficient planes. MET  6) Patient to negotiate steps with a step-to pattern with use of HR. MET  7) Patient to report decreased sleep interruption secondary to pain. MET  8) Increase ambulatory tolerance by 20 min with LRAD. MET    Long Term Goals: to be achieved by discharge   1) FOTO equal to or greater than 71. PROGRESSED, BUT NOT MET  2) Patient to be independent with comprehensive HEP. MET  3) Abolish pain for improved quality of life. PROGRESSED, BUT NOT MET  4) Increase LE strength to 5/5 MMT grade in all planes to improve a/iadls. MET  5) Achieve full knee extension ROM to improve a/iadls. PROGRESSED, BUT NOT MET  6) Increase knee flexion ROM to within 5 deg of contralateral LE to improve a/iadls. PROGRESSED, BUT NOT MET  7) Patient to negotiate steps with a reciprocal pattern with use of Hr. PROGRESSED, BUT NOT MET  8) Increase ambulatory tolerance to 60 min to improve participation in social activities. PROGRESSED, BUT NOT MET  9) Patient to report no sleep interruption secondary to pain. PROGRESSED, BUT NOT MET    Plan  Patient would benefit from: skilled PT  Planned modality interventions: biofeedback, cryotherapy, electrical stimulation/Russian stimulation, TENS and low level laser therapy  Planned therapy interventions: home exercise program  Duration in visits: 1  Duration in weeks: 1  Plan of Care beginning date: 3/14/2024  Plan of Care expiration date: 3/14/2024  Treatment plan discussed with: patient      Subjective Evaluation    History of Present Illness  Mechanism of injury: Patient currently estimates her right knee overall level of function to be approximately  90-95%. Patient's primary limitation in her right knee continues to be limited flexion ROM. Patient does not notice her limited ROM when performing functional activities. Patient does continue to have mild pain, but this is very manageable.     Patient estimates her left knee overall level of function to be approximately 80-85%. Patient continues to have intermittent pain and weakness. Patient finds that her left knee fatigues more quickly than her right. Patient is reporting improved ROM in her left knee compared to her right knee.     Patient's quality of sleep has returned to normal. Patient has been using her SPC at times and also walks independently. Patient has been pushing her ambulation and is able to walk at least 45 minutes prior to resting. Patient is able to walk up hills with less difficulty. Patient remains in her ex 's home and will be transitioning back to her house on 3/17.   Patient Goals  Patient goal: decreased pain, improved activity tolerance, improve quality of gait  Pain  Pain scale: R: 0/10 ; L: 0/10.  Pain scale at lowest: R: 0/10 ; L: 0/10.  Pain scale at highest: R: 1/10 ; L: 4/10.  Location: left > right knee: diffuse  Quality: dull ache (strained, tired)    Social Support    Employment status: not working  Treatments  Current treatment: physical therapy      Objective     Observations   Left Knee   Positive for effusion and incision (fair scar tissue restriction). Negative for edema.     Right Knee   Positive for effusion and incision (good scar tissue mobility). Negative for edema.     Active Range of Motion   Left Knee   Flexion: 100 degrees   Extension: -5 degrees     Right Knee   Flexion: 85 degrees with pain  Extension: -3 degrees     Passive Range of Motion   Left Knee   Flexion: 105 degrees   Extension: -3 degrees     Right Knee   Flexion: 90 degrees with pain  Extension: 0 degrees with pain    Mobility   Patellar Mobility:   Left Knee   Hypomobile: left medial, left  "lateral, left superior and left inferior    Right Knee   Hypomobile: medial, lateral, superior and inferior     Strength/Myotome Testing     Left Hip   Planes of Motion   Flexion: 5    Right Hip   Planes of Motion   Flexion: 5    Left Knee   Flexion: 5  Extension: 5  Quadriceps contraction: fair    Right Knee   Flexion: 5  Extension: 5  Quadriceps contraction: fair    Left Ankle/Foot   Dorsiflexion: 5    Right Ankle/Foot   Dorsiflexion: 5    Tests   Left Ankle/Foot   Negative for Homans' sign.     Right Ankle/Foot   Negative for Homans' sign.     Ambulation   Weight-Bearing Status   Weight-Bearing Status (Left): full weight bearing   Weight-Bearing Status (Right): full weight-bearing      Ambulation: Level Surfaces   Ambulation with assistive device: independent  Ambulation without assistive device: independent    Observational Gait   Gait: antalgic   Decreased walking speed and stride length.     Functional Assessment      Squat    Left within functional limits and right within functional limits.     Comments  INITIAL EVALUATION (12/11/23)  Timed Up and Go (TUG): 45.10 sec, Mod I ambulation with RW and sit to/from stand with b/l arm rests  Five Times Sit to Stand Test: 37.40 sec, Mod I with b/l arm rests, good eccentric control, equal weightbearing    REEVALUATION (1/12/24):  Timed Up and Go (TUG): 11.21 sec, Independent ambulation, Mod I sit to/from stand with arm rests  Five Times Sit to Stand Test: 12.43 sec, Mod I with b/l arm rests, good eccentric control  FSU 6\" step: R: Mod I with b/l handrail ; L: Mod I with b/l handrail   FSD 6\" step: R: Mod I with b/l handrails, poor eccentric control ; L: Mod I with b/l handrails, poor eccentric control    REEVALUATION (2/14/24):  Timed Up and Go (TUG): 10.39 sec, Independent sit to/from stand  Five Times Sit to Stand Test: 13.58 sec, Independent, good eccentric control ; 10.21 sec, Mod I, good eccentric control  FSU 8\" step: Mod I, WFL bilaterally  FSD 8\" step: Mod I " "with single hand rail, fair eccentric control bilaterally    REEVALUATION (3/14/24):  Timed Up and Go (TUG): 9.77 sec, Independent ambulation, Mod I sit to/from stand with b/l arm rests  Five Times Sit to Stand Test: 10.56 sec, Independent, good eccentric control  FSU 8\" step: Mod I, WFL bilaterally  FSD 8\" step: Mod I with single hand rail, fair eccentric control bilaterally      Flowsheet Rows      Flowsheet Row Most Recent Value   PT/OT G-Codes    Current Score 64   Projected Score 71                 POC expires Unit limit Auth  expiration date PT/OT + Visit Limit?   4/15/24 N/A N/A BOMN                 Visit/Unit Tracking  AUTH Status:  Date 2/12 2/14 2/19 2/23 2/26 2/29 3/4 3/7 3/1 3/14  2/2   N/A Used 13 14 15 16 17 18 19 20 21 22  12    Remaining                    Precautions: s/p L TKA (DOS: 12/7/23), R TKA (DOS: 9/25/23)      Manuals 2/23 2/26 2/29 3/4 3/7 3/11 3/14  2/14 2/19   B/L knee PROM GR SC SC SC hold on L / p!.  GR flexion only GR   NV DB   Gr. II-IV pat mobs             B/L calf, h/s str.             Reassessment       GR  GR                              Neuro Re-Ed             SAQ             TKE             Supine SLR  3x 10 b/l  3x 10 b/l  3x 10 b/l       2  x10   Quad set with heel prop             Hurdles F and L  2 laps ea. NV  2 laps       3 laps ea. VC's no UE                               Ther Ex             Patient education: pathophysiology, pain management, walking program, HEP review       GR  GR    Recumbent bike Upright bike 10 min Rec bike 10 min  Rec bike 10 min  Rec bike no tension 10 min fwd  Rec bike 10 min Upright bike 10 min Rec bike 10 min  Rec bike 10 min Rec bike 15 min   HR             SB calf str.             LAQ      2# 3x10 b/l       Standing h/s curl      2# 2x10 b/l       Heel slides             Seated w/ opposite OP knee flexion stretch              Hip abd    2x 10  b/l  Reviewed          Prone hang     6 min        Prone hip flexor str.     3x30\" b/l 3x30\" b/l    " "   Ther Activity             FSU             FSD  6\" 2x 10 b/l UE support.  6\" 10x u/l UE support.  6\" 10x u/L UE support.  Up and down   6\" x10 b/l       Sit to stands  10# 2x10 10# 3x 10  10# 3x 10  10# 3x 10          Squats             Total gym: bilateral squats L22 2x10 L22 3x 10  L22 3x 10  L22 3x 10  L22 3x10 L22 3x10    L22 2 x 10    5 Times Sit to Stand Test, TUG, Stair training       Performed      LSD      4\" x15 b/l       Gait Training                                       Modalities             CP                                "

## 2024-03-18 ENCOUNTER — APPOINTMENT (OUTPATIENT)
Dept: PHYSICAL THERAPY | Facility: CLINIC | Age: 79
End: 2024-03-18
Payer: MEDICARE

## 2024-03-21 ENCOUNTER — APPOINTMENT (OUTPATIENT)
Dept: PHYSICAL THERAPY | Facility: CLINIC | Age: 79
End: 2024-03-21
Payer: MEDICARE

## 2024-04-24 NOTE — PROGRESS NOTES
Name: Agnieszka Camejo      : 1945      MRN: [id number]  Encounter Provider: Aviva Delgado MD  Encounter Date: 2024   Encounter department: [facility] [place]    Assessment & Plan     1. Palpitations  Assessment & Plan:  As mentioned in history of present illness patient with episodes of palpitation couple of times under a lot of stress no chest pains shortness of breath or sweating at the time EKG done has shown regular sinus rhythm 66/min no acute changes    Orders:  -     POCT ECG    2. Class 1 obesity due to excess calories without serious comorbidity with body mass index (BMI) of 34.0 to 34.9 in adult  Assessment & Plan:  Patient BMI is 34.20 went up slightly recommend very strongly to follow strict diet lifestyle modification and lose weight.  Patient is having difficulty moving around too much exercise because of pain discomfort in the knee joints patient status post bilateral knee replacement.      3. Gastroesophageal reflux disease without esophagitis  Assessment & Plan:  Patient with GERD symptoms on omeprazole 20 mg daily when she misses a dose she feels the discomfort immediately continue the same medication for now.      4. Primary hypertension  Assessment & Plan:  Blood pressure is 130/76 on metoprolol 50 mg in the morning and half a tablet of 50 mg in the evening we will continue    Orders:  -     metoprolol tartrate (LOPRESSOR) 50 mg tablet; Take half a tablet of the 50 mg in the morning and half a tablet of 50 mg in the evening    5. Acquired hypothyroidism  Assessment & Plan:  Currently patient taking levothyroxine 25 mcg we will follow-up with repeat TSH level which I have ordered earlier    Orders:  -     levothyroxine 25 mcg tablet; Take 1 tablet (25 mcg total) by mouth every morning    6. Primary osteoarthritis involving multiple joints  Assessment & Plan:  Patient with osteoarthritis currently taking Tylenol she used to take 4 tablets a day now she is down to 1  daily      7. Osteopenia of multiple sites    8. Stage 3a chronic kidney disease (HCC)  Assessment & Plan:  Lab Results   Component Value Date    EGFR 66 12/08/2023    EGFR 58 11/09/2023    EGFR 51 (L) 09/26/2023    CREATININE 0.90 12/08/2023    CREATININE 0.95 11/09/2023    CREATININE 1.11 (H) 09/26/2023   GFR is now 66 with a creatinine of 0.92 will follow-up with repeat lab work             Subjective      Patient is coming here for a follow-up evaluation with regards to symptoms of hypothyroidism, hypertension, DJD patient bilateral knee replacement still has some pain discomfort in the knee especially in the right side she is not able to completely flex it.  She has been also under a lot of stress in the recent past her mom passed away couple of weeks back and she had developed some palpitation symptoms couple of times.  Medications reviewed recommend patient to go for the lab work which I have ordered earlier.      Review of Systems   Constitutional:  Negative for chills and fever.   HENT:  Negative for ear pain and sore throat.    Eyes:  Negative for pain and visual disturbance.   Respiratory:  Negative for cough and shortness of breath.    Cardiovascular:  Positive for palpitations. Negative for chest pain.   Gastrointestinal:  Negative for abdominal pain and vomiting.   Genitourinary:  Negative for dysuria and hematuria.   Musculoskeletal:  Positive for arthralgias. Negative for back pain.   Skin:  Negative for color change and rash.   Neurological:  Negative for seizures and syncope.   All other systems reviewed and are negative.      Current Outpatient Medications on File Prior to Visit   Medication Sig   • aspirin (ECOTRIN LOW STRENGTH) 81 mg EC tablet Take 81 mg by mouth daily   • calcium carbonate-vitamin D 500 mg-5 mcg per tablet Take 1 tablet by mouth every morning   • Multiple Vitamin (multivitamin) tablet Take 1 tablet by mouth daily   • mupirocin (BACTROBAN) 2 % ointment APPLY OINTMENT TOPICALLY  "TO AFFECTED AREA TWICE DAILY. BEGIN 5 DAYS PRIOR TO SURGERY, APPY TO NOSTRILS   • omeprazole (PriLOSEC) 20 mg delayed release capsule One tablet by mouth 1/2 hour before breakfast daily   • [DISCONTINUED] levothyroxine 25 mcg tablet Take 1 tablet (25 mcg total) by mouth every morning   • [DISCONTINUED] metoprolol tartrate (LOPRESSOR) 50 mg tablet Take half a tablet of the 50 mg in the morning and half a tablet of 50 mg in the evening       Objective     /76 (BP Location: Right arm, Patient Position: Sitting, Cuff Size: Standard)   Pulse 95   Ht 5' 2\" (1.575 m)   Wt 84.8 kg (187 lb)   SpO2 96%   BMI 34.20 kg/m²     Physical Exam  Vitals and nursing note reviewed.   Constitutional:       Appearance: Normal appearance.   Cardiovascular:      Rate and Rhythm: Normal rate and regular rhythm.      Heart sounds: Normal heart sounds.   Pulmonary:      Effort: Pulmonary effort is normal.      Breath sounds: Normal breath sounds.   Abdominal:      Palpations: Abdomen is soft.   Musculoskeletal:      Cervical back: Normal range of motion and neck supple.      Right lower leg: No edema.      Left lower leg: No edema.      Comments: Movements of the knee joints especially the right side causing some discomfort and pain especially with flexion   Skin:     General: Skin is warm and dry.   Neurological:      General: No focal deficit present.      Mental Status: She is alert and oriented to person, place, and time.   Psychiatric:         Mood and Affect: Mood normal.         Behavior: Behavior normal.       Aviva Delgado MD    "

## 2024-04-26 ENCOUNTER — OFFICE VISIT (OUTPATIENT)
Dept: FAMILY MEDICINE CLINIC | Facility: CLINIC | Age: 79
End: 2024-04-26
Payer: MEDICARE

## 2024-04-26 VITALS
SYSTOLIC BLOOD PRESSURE: 130 MMHG | WEIGHT: 187 LBS | OXYGEN SATURATION: 96 % | DIASTOLIC BLOOD PRESSURE: 76 MMHG | HEIGHT: 62 IN | HEART RATE: 95 BPM | BODY MASS INDEX: 34.41 KG/M2

## 2024-04-26 DIAGNOSIS — I10 PRIMARY HYPERTENSION: ICD-10-CM

## 2024-04-26 DIAGNOSIS — M15.9 PRIMARY OSTEOARTHRITIS INVOLVING MULTIPLE JOINTS: ICD-10-CM

## 2024-04-26 DIAGNOSIS — E66.09 CLASS 1 OBESITY DUE TO EXCESS CALORIES WITHOUT SERIOUS COMORBIDITY WITH BODY MASS INDEX (BMI) OF 34.0 TO 34.9 IN ADULT: ICD-10-CM

## 2024-04-26 DIAGNOSIS — N18.31 STAGE 3A CHRONIC KIDNEY DISEASE (HCC): ICD-10-CM

## 2024-04-26 DIAGNOSIS — R00.2 PALPITATIONS: Primary | ICD-10-CM

## 2024-04-26 DIAGNOSIS — M85.89 OSTEOPENIA OF MULTIPLE SITES: ICD-10-CM

## 2024-04-26 DIAGNOSIS — E03.9 ACQUIRED HYPOTHYROIDISM: ICD-10-CM

## 2024-04-26 DIAGNOSIS — K21.9 GASTROESOPHAGEAL REFLUX DISEASE WITHOUT ESOPHAGITIS: ICD-10-CM

## 2024-04-26 LAB
QRS AXIS: NORMAL DEGREES
QT INTERVAL: NORMAL MS

## 2024-04-26 PROCEDURE — 99214 OFFICE O/P EST MOD 30 MIN: CPT | Performed by: INTERNAL MEDICINE

## 2024-04-26 PROCEDURE — 93000 ELECTROCARDIOGRAM COMPLETE: CPT | Performed by: INTERNAL MEDICINE

## 2024-04-26 RX ORDER — LEVOTHYROXINE SODIUM 0.03 MG/1
25 TABLET ORAL EVERY MORNING
Qty: 90 TABLET | Refills: 1 | Status: SHIPPED | OUTPATIENT
Start: 2024-04-26

## 2024-04-26 RX ORDER — METOPROLOL TARTRATE 50 MG/1
TABLET, FILM COATED ORAL
Qty: 90 TABLET | Refills: 1 | Status: SHIPPED | OUTPATIENT
Start: 2024-04-26

## 2024-04-26 NOTE — ASSESSMENT & PLAN NOTE
Patient with GERD symptoms on omeprazole 20 mg daily when she misses a dose she feels the discomfort immediately continue the same medication for now.

## 2024-04-26 NOTE — ASSESSMENT & PLAN NOTE
Patient BMI is 34.20 went up slightly recommend very strongly to follow strict diet lifestyle modification and lose weight.  Patient is having difficulty moving around too much exercise because of pain discomfort in the knee joints patient status post bilateral knee replacement.

## 2024-04-26 NOTE — ASSESSMENT & PLAN NOTE
Currently patient taking levothyroxine 25 mcg we will follow-up with repeat TSH level which I have ordered earlier

## 2024-04-26 NOTE — ASSESSMENT & PLAN NOTE
As mentioned in history of present illness patient with episodes of palpitation couple of times under a lot of stress no chest pains shortness of breath or sweating at the time EKG done has shown regular sinus rhythm 66/min no acute changes

## 2024-04-26 NOTE — ASSESSMENT & PLAN NOTE
Lab Results   Component Value Date    EGFR 66 12/08/2023    EGFR 58 11/09/2023    EGFR 51 (L) 09/26/2023    CREATININE 0.90 12/08/2023    CREATININE 0.95 11/09/2023    CREATININE 1.11 (H) 09/26/2023   GFR is now 66 with a creatinine of 0.92 will follow-up with repeat lab work

## 2024-04-26 NOTE — ASSESSMENT & PLAN NOTE
Patient with osteoarthritis currently taking Tylenol she used to take 4 tablets a day now she is down to 1 daily

## 2024-05-06 ENCOUNTER — LAB (OUTPATIENT)
Dept: LAB | Facility: HOSPITAL | Age: 79
End: 2024-05-06
Payer: MEDICARE

## 2024-05-06 DIAGNOSIS — E78.5 DYSLIPIDEMIA: ICD-10-CM

## 2024-05-06 DIAGNOSIS — E03.9 ACQUIRED HYPOTHYROIDISM: ICD-10-CM

## 2024-05-06 DIAGNOSIS — R73.03 PRE-DIABETES: ICD-10-CM

## 2024-05-06 DIAGNOSIS — Z13.0 SCREENING FOR DEFICIENCY ANEMIA: ICD-10-CM

## 2024-05-06 LAB
ALBUMIN SERPL BCP-MCNC: 3.9 G/DL (ref 3.5–5)
ALP SERPL-CCNC: 60 U/L (ref 34–104)
ALT SERPL W P-5'-P-CCNC: 11 U/L (ref 7–52)
ANION GAP SERPL CALCULATED.3IONS-SCNC: 4 MMOL/L (ref 4–13)
AST SERPL W P-5'-P-CCNC: 14 U/L (ref 13–39)
BACTERIA UR QL AUTO: ABNORMAL /HPF
BASOPHILS # BLD AUTO: 0.04 THOUSANDS/ÂΜL (ref 0–0.1)
BASOPHILS NFR BLD AUTO: 1 % (ref 0–1)
BILIRUB SERPL-MCNC: 0.49 MG/DL (ref 0.2–1)
BILIRUB UR QL STRIP: NEGATIVE
BUN SERPL-MCNC: 19 MG/DL (ref 5–25)
CALCIUM SERPL-MCNC: 8.6 MG/DL (ref 8.4–10.2)
CHLORIDE SERPL-SCNC: 105 MMOL/L (ref 96–108)
CHOLEST SERPL-MCNC: 183 MG/DL
CLARITY UR: ABNORMAL
CO2 SERPL-SCNC: 29 MMOL/L (ref 21–32)
COLOR UR: YELLOW
CREAT SERPL-MCNC: 0.92 MG/DL (ref 0.6–1.3)
EOSINOPHIL # BLD AUTO: 0.15 THOUSAND/ÂΜL (ref 0–0.61)
EOSINOPHIL NFR BLD AUTO: 3 % (ref 0–6)
ERYTHROCYTE [DISTWIDTH] IN BLOOD BY AUTOMATED COUNT: 12.6 % (ref 11.6–15.1)
EST. AVERAGE GLUCOSE BLD GHB EST-MCNC: 120 MG/DL
GFR SERPL CREATININE-BSD FRML MDRD: 60 ML/MIN/1.73SQ M
GLUCOSE P FAST SERPL-MCNC: 96 MG/DL (ref 65–99)
GLUCOSE UR STRIP-MCNC: NEGATIVE MG/DL
HBA1C MFR BLD: 5.8 %
HCT VFR BLD AUTO: 43.7 % (ref 34.8–46.1)
HDLC SERPL-MCNC: 39 MG/DL
HGB BLD-MCNC: 14.4 G/DL (ref 11.5–15.4)
HGB UR QL STRIP.AUTO: ABNORMAL
IMM GRANULOCYTES # BLD AUTO: 0.01 THOUSAND/UL (ref 0–0.2)
IMM GRANULOCYTES NFR BLD AUTO: 0 % (ref 0–2)
KETONES UR STRIP-MCNC: NEGATIVE MG/DL
LDLC SERPL CALC-MCNC: 108 MG/DL (ref 0–100)
LEUKOCYTE ESTERASE UR QL STRIP: ABNORMAL
LYMPHOCYTES # BLD AUTO: 1.52 THOUSANDS/ÂΜL (ref 0.6–4.47)
LYMPHOCYTES NFR BLD AUTO: 33 % (ref 14–44)
MCH RBC QN AUTO: 31.1 PG (ref 26.8–34.3)
MCHC RBC AUTO-ENTMCNC: 33 G/DL (ref 31.4–37.4)
MCV RBC AUTO: 94 FL (ref 82–98)
MONOCYTES # BLD AUTO: 0.42 THOUSAND/ÂΜL (ref 0.17–1.22)
MONOCYTES NFR BLD AUTO: 9 % (ref 4–12)
MUCOUS THREADS UR QL AUTO: ABNORMAL
NEUTROPHILS # BLD AUTO: 2.44 THOUSANDS/ÂΜL (ref 1.85–7.62)
NEUTS SEG NFR BLD AUTO: 54 % (ref 43–75)
NITRITE UR QL STRIP: NEGATIVE
NON-SQ EPI CELLS URNS QL MICRO: ABNORMAL /HPF
NONHDLC SERPL-MCNC: 144 MG/DL
NRBC BLD AUTO-RTO: 0 /100 WBCS
PH UR STRIP.AUTO: 6 [PH]
PLATELET # BLD AUTO: 274 THOUSANDS/UL (ref 149–390)
PMV BLD AUTO: 9.4 FL (ref 8.9–12.7)
POTASSIUM SERPL-SCNC: 4 MMOL/L (ref 3.5–5.3)
PROT SERPL-MCNC: 6.2 G/DL (ref 6.4–8.4)
PROT UR STRIP-MCNC: NEGATIVE MG/DL
RBC # BLD AUTO: 4.63 MILLION/UL (ref 3.81–5.12)
RBC #/AREA URNS AUTO: ABNORMAL /HPF
SODIUM SERPL-SCNC: 138 MMOL/L (ref 135–147)
SP GR UR STRIP.AUTO: 1.01 (ref 1–1.03)
TRIGL SERPL-MCNC: 178 MG/DL
TSH SERPL DL<=0.05 MIU/L-ACNC: 3.2 UIU/ML (ref 0.45–4.5)
UROBILINOGEN UR STRIP-ACNC: <2 MG/DL
WBC # BLD AUTO: 4.58 THOUSAND/UL (ref 4.31–10.16)
WBC #/AREA URNS AUTO: ABNORMAL /HPF

## 2024-05-06 PROCEDURE — 87086 URINE CULTURE/COLONY COUNT: CPT

## 2024-05-06 PROCEDURE — 85025 COMPLETE CBC W/AUTO DIFF WBC: CPT

## 2024-05-06 PROCEDURE — 81001 URINALYSIS AUTO W/SCOPE: CPT

## 2024-05-06 PROCEDURE — 80061 LIPID PANEL: CPT

## 2024-05-06 PROCEDURE — 80053 COMPREHEN METABOLIC PANEL: CPT

## 2024-05-06 PROCEDURE — 84443 ASSAY THYROID STIM HORMONE: CPT

## 2024-05-06 PROCEDURE — 83036 HEMOGLOBIN GLYCOSYLATED A1C: CPT

## 2024-05-06 PROCEDURE — 36415 COLL VENOUS BLD VENIPUNCTURE: CPT

## 2024-05-06 NOTE — RESULT ENCOUNTER NOTE
Inform patient her hemoglobin A1c which reflects the sugars for the last 3 to 4 months time is coming up slightly high at 5.8.  She needs to watch the carbohydrate intake and sugar intake I will review the labs on her next office visit

## 2024-05-07 DIAGNOSIS — N39.0 URINARY TRACT INFECTION WITHOUT HEMATURIA, SITE UNSPECIFIED: Primary | ICD-10-CM

## 2024-05-07 LAB — BACTERIA UR CULT: NORMAL

## 2024-05-07 NOTE — RESULT ENCOUNTER NOTE
Inform patient we have to repeat the urine analysis and culture since the current urinalysis and culture is showing is a contamination.  I will order the repeat test she can  the paper if she wants to

## 2024-06-12 ENCOUNTER — TELEPHONE (OUTPATIENT)
Age: 79
End: 2024-06-12

## 2024-06-12 ENCOUNTER — TELEPHONE (OUTPATIENT)
Dept: FAMILY MEDICINE CLINIC | Facility: CLINIC | Age: 79
End: 2024-06-12

## 2024-06-12 DIAGNOSIS — Z12.39 ENCOUNTER FOR BREAST CANCER SCREENING OTHER THAN MAMMOGRAM: Primary | ICD-10-CM

## 2024-06-12 DIAGNOSIS — Z12.31 SCREENING MAMMOGRAM FOR BREAST CANCER: ICD-10-CM

## 2024-06-12 NOTE — TELEPHONE ENCOUNTER
Agnieszka is due for her mammogram. She is requesting new script. Please advise and danna Agnieszka when ready so she know to call and schedule.   Thank you!

## 2024-07-01 DIAGNOSIS — E03.9 ACQUIRED HYPOTHYROIDISM: ICD-10-CM

## 2024-07-01 DIAGNOSIS — I10 PRIMARY HYPERTENSION: ICD-10-CM

## 2024-07-01 RX ORDER — LEVOTHYROXINE SODIUM 0.03 MG/1
25 TABLET ORAL EVERY MORNING
Qty: 90 TABLET | Refills: 1 | Status: SHIPPED | OUTPATIENT
Start: 2024-07-01

## 2024-07-01 RX ORDER — METOPROLOL TARTRATE 50 MG/1
TABLET, FILM COATED ORAL
Qty: 90 TABLET | Refills: 1 | Status: SHIPPED | OUTPATIENT
Start: 2024-07-01

## 2024-07-15 ENCOUNTER — TELEPHONE (OUTPATIENT)
Age: 79
End: 2024-07-15

## 2024-07-31 NOTE — PATIENT INSTRUCTIONS
Medicare Preventive Visit Patient Instructions  Thank you for completing your Welcome to Medicare Visit or Medicare Annual Wellness Visit today. Your next wellness visit will be due in one year (8/1/2025).  The screening/preventive services that you may require over the next 5-10 years are detailed below. Some tests may not apply to you based off risk factors and/or age. Screening tests ordered at today's visit but not completed yet may show as past due. Also, please note that scanned in results may not display below.  Preventive Screenings:  Service Recommendations Previous Testing/Comments   Colorectal Cancer Screening  * Colonoscopy    * Fecal Occult Blood Test (FOBT)/Fecal Immunochemical Test (FIT)  * Fecal DNA/Cologuard Test  * Flexible Sigmoidoscopy Age: 45-75 years old   Colonoscopy: every 10 years (may be performed more frequently if at higher risk)  OR  FOBT/FIT: every 1 year  OR  Cologuard: every 3 years  OR  Sigmoidoscopy: every 5 years  Screening may be recommended earlier than age 45 if at higher risk for colorectal cancer. Also, an individualized decision between you and your healthcare provider will decide whether screening between the ages of 76-85 would be appropriate. Colonoscopy: 04/13/2022  FOBT/FIT: Not on file  Cologuard: Not on file  Sigmoidoscopy: Not on file          Breast Cancer Screening Age: 40+ years old  Frequency: every 1-2 years  Not required if history of left and right mastectomy Mammogram: 06/29/2023        Cervical Cancer Screening Between the ages of 21-29, pap smear recommended once every 3 years.   Between the ages of 30-65, can perform pap smear with HPV co-testing every 5 years.   Recommendations may differ for women with a history of total hysterectomy, cervical cancer, or abnormal pap smears in past. Pap Smear: 10/28/2019        Hepatitis C Screening Once for adults born between 1945 and 1965  More frequently in patients at high risk for Hepatitis C Hep C Antibody: Not on  file        Diabetes Screening 1-2 times per year if you're at risk for diabetes or have pre-diabetes Fasting glucose: 96 mg/dL (5/6/2024)  A1C: 5.8 % (5/6/2024)      Cholesterol Screening Once every 5 years if you don't have a lipid disorder. May order more often based on risk factors. Lipid panel: 05/06/2024          Other Preventive Screenings Covered by Medicare:  Abdominal Aortic Aneurysm (AAA) Screening: covered once if your at risk. You're considered to be at risk if you have a family history of AAA.  Lung Cancer Screening: covers low dose CT scan once per year if you meet all of the following conditions: (1) Age 55-77; (2) No signs or symptoms of lung cancer; (3) Current smoker or have quit smoking within the last 15 years; (4) You have a tobacco smoking history of at least 20 pack years (packs per day multiplied by number of years you smoked); (5) You get a written order from a healthcare provider.  Glaucoma Screening: covered annually if you're considered high risk: (1) You have diabetes OR (2) Family history of glaucoma OR (3)  aged 50 and older OR (4)  American aged 65 and older  Osteoporosis Screening: covered every 2 years if you meet one of the following conditions: (1) You're estrogen deficient and at risk for osteoporosis based off medical history and other findings; (2) Have a vertebral abnormality; (3) On glucocorticoid therapy for more than 3 months; (4) Have primary hyperparathyroidism; (5) On osteoporosis medications and need to assess response to drug therapy.   Last bone density test (DXA Scan): 06/29/2023.  HIV Screening: covered annually if you're between the age of 15-65. Also covered annually if you are younger than 15 and older than 65 with risk factors for HIV infection. For pregnant patients, it is covered up to 3 times per pregnancy.    Immunizations:  Immunization Recommendations   Influenza Vaccine Annual influenza vaccination during flu season is recommended  for all persons aged >= 6 months who do not have contraindications   Pneumococcal Vaccine   * Pneumococcal conjugate vaccine = PCV13 (Prevnar 13), PCV15 (Vaxneuvance), PCV20 (Prevnar 20)  * Pneumococcal polysaccharide vaccine = PPSV23 (Pneumovax) Adults 19-65 yo with certain risk factors or if 65+ yo  If never received any pneumonia vaccine: recommend Prevnar 20 (PCV20)  Give PCV20 if previously received 1 dose of PCV13 or PPSV23   Hepatitis B Vaccine 3 dose series if at intermediate or high risk (ex: diabetes, end stage renal disease, liver disease)   Respiratory syncytial virus (RSV) Vaccine - COVERED BY MEDICARE PART D  * RSVPreF3 (Arexvy) CDC recommends that adults 60 years of age and older may receive a single dose of RSV vaccine using shared clinical decision-making (SCDM)   Tetanus (Td) Vaccine - COST NOT COVERED BY MEDICARE PART B Following completion of primary series, a booster dose should be given every 10 years to maintain immunity against tetanus. Td may also be given as tetanus wound prophylaxis.   Tdap Vaccine - COST NOT COVERED BY MEDICARE PART B Recommended at least once for all adults. For pregnant patients, recommended with each pregnancy.   Shingles Vaccine (Shingrix) - COST NOT COVERED BY MEDICARE PART B  2 shot series recommended in those 19 years and older who have or will have weakened immune systems or those 50 years and older     Health Maintenance Due:      Topic Date Due   • Hepatitis C Screening  Never done   • Breast Cancer Screening: Mammogram  06/29/2024   • Colorectal Cancer Screening  Discontinued     Immunizations Due:      Topic Date Due   • Pneumococcal Vaccine: 65+ Years (1 of 1 - PCV) Never done   • COVID-19 Vaccine (2 - 2023-24 season) 09/01/2023   • Influenza Vaccine (1) 09/01/2024     Advance Directives   What are advance directives?  Advance directives are legal documents that state your wishes and plans for medical care. These plans are made ahead of time in case you lose  your ability to make decisions for yourself. Advance directives can apply to any medical decision, such as the treatments you want, and if you want to donate organs.   What are the types of advance directives?  There are many types of advance directives, and each state has rules about how to use them. You may choose a combination of any of the following:  Living will:  This is a written record of the treatment you want. You can also choose which treatments you do not want, which to limit, and which to stop at a certain time. This includes surgery, medicine, IV fluid, and tube feedings.   Durable power of  for healthcare (DPAHC):  This is a written record that states who you want to make healthcare choices for you when you are unable to make them for yourself. This person, called a proxy, is usually a family member or a friend. You may choose more than 1 proxy.  Do not resuscitate (DNR) order:  A DNR order is used in case your heart stops beating or you stop breathing. It is a request not to have certain forms of treatment, such as CPR. A DNR order may be included in other types of advance directives.  Medical directive:  This covers the care that you want if you are in a coma, near death, or unable to make decisions for yourself. You can list the treatments you want for each condition. Treatment may include pain medicine, surgery, blood transfusions, dialysis, IV or tube feedings, and a ventilator (breathing machine).  Values history:  This document has questions about your views, beliefs, and how you feel and think about life. This information can help others choose the care that you would choose.  Why are advance directives important?  An advance directive helps you control your care. Although spoken wishes may be used, it is better to have your wishes written down. Spoken wishes can be misunderstood, or not followed. Treatments may be given even if you do not want them. An advance directive may make it  easier for your family to make difficult choices about your care.   Weight Management   Why it is important to manage your weight:  Being overweight increases your risk of health conditions such as heart disease, high blood pressure, type 2 diabetes, and certain types of cancer. It can also increase your risk for osteoarthritis, sleep apnea, and other respiratory problems. Aim for a slow, steady weight loss. Even a small amount of weight loss can lower your risk of health problems.  How to lose weight safely:  A safe and healthy way to lose weight is to eat fewer calories and get regular exercise. You can lose up about 1 pound a week by decreasing the number of calories you eat by 500 calories each day.   Healthy meal plan for weight management:  A healthy meal plan includes a variety of foods, contains fewer calories, and helps you stay healthy. A healthy meal plan includes the following:  Eat whole-grain foods more often.  A healthy meal plan should contain fiber. Fiber is the part of grains, fruits, and vegetables that is not broken down by your body. Whole-grain foods are healthy and provide extra fiber in your diet. Some examples of whole-grain foods are whole-wheat breads and pastas, oatmeal, brown rice, and bulgur.  Eat a variety of vegetables every day.  Include dark, leafy greens such as spinach, kale, stephanie greens, and mustard greens. Eat yellow and orange vegetables such as carrots, sweet potatoes, and winter squash.   Eat a variety of fruits every day.  Choose fresh or canned fruit (canned in its own juice or light syrup) instead of juice. Fruit juice has very little or no fiber.  Eat low-fat dairy foods.  Drink fat-free (skim) milk or 1% milk. Eat fat-free yogurt and low-fat cottage cheese. Try low-fat cheeses such as mozzarella and other reduced-fat cheeses.  Choose meat and other protein foods that are low in fat.  Choose beans or other legumes such as split peas or lentils. Choose fish, skinless  poultry (chicken or turkey), or lean cuts of red meat (beef or pork). Before you cook meat or poultry, cut off any visible fat.   Use less fat and oil.  Try baking foods instead of frying them. Add less fat, such as margarine, sour cream, regular salad dressing and mayonnaise to foods. Eat fewer high-fat foods. Some examples of high-fat foods include french fries, doughnuts, ice cream, and cakes.  Eat fewer sweets.  Limit foods and drinks that are high in sugar. This includes candy, cookies, regular soda, and sweetened drinks.  Exercise:  Exercise at least 30 minutes per day on most days of the week. Some examples of exercise include walking, biking, dancing, and swimming. You can also fit in more physical activity by taking the stairs instead of the elevator or parking farther away from stores. Ask your healthcare provider about the best exercise plan for you.      © Copyright [employer] 2018 Information is for End User's use only and may not be sold, redistributed or otherwise used for commercial purposes. All illustrations and images included in CareNotes® are the copyrighted property of A.D.A.M., Inc. or [employer]

## 2024-07-31 NOTE — PROGRESS NOTES
Ambulatory Visit  Name: Agineszka Camejo      : 1945      MRN: [id number]  Encounter Provider: Aviva Delgado MD  Encounter Date: 2024   Encounter department: [facility] [place]    Assessment & Plan   1. Class 1 obesity due to excess calories without serious comorbidity with body mass index (BMI) of 33.0 to 33.9 in adult  Assessment & Plan:  Patient BMI is 33.36 slightly less compared to before but still high emphasized regarding lifestyle modification diet exercise.  Had a lengthy discussion with patient regarding various options she would prefer to see if she can have the Wegovy injection to help her lose weight will order the same and see if insurance company approves it.  Orders:  -     Semaglutide-Weight Management (Wegovy) 0.25 MG/0.5ML; Inject 0.25 mg under the skin weekly  2. Gastroesophageal reflux disease without esophagitis  Assessment & Plan:  Patient with GERD symptoms on omeprazole we will continue the same.  3. Primary hypertension  Assessment & Plan:  Blood pressure is stable 126/80 currently on metoprolol 50 mg half a tablet twice a day continue  4. Acquired hypothyroidism  Assessment & Plan:  TSH level is normal at 3.196 continue with the same dose of levothyroxine 25 mcg daily  5. Primary osteoarthritis involving multiple joints  Assessment & Plan:  Patient with bilateral knee replacements still has some discomfort feeling limitation with movements takes Tylenol as needed.  The knee discomfort also prevents her from moving around much  6. Osteopenia of multiple sites  7. Stage 3a chronic kidney disease (HCC)  Assessment & Plan:  Lab Results   Component Value Date    EGFR 60 2024    EGFR 66 2023    EGFR 58 2023    CREATININE 0.92 2024    CREATININE 0.90 2023    CREATININE 0.95 2023   GFR is at 60 creatinine 0.92 stable  8. Urinary tract infection without hematuria, site unspecified  -     UA w Reflex to Microscopic w Reflex to Culture;  Future      Urinary Incontinence Plan of Care: counseling topics discussed: practice Kegel (pelvic floor strengthening) exercises, use restroom every 2 hours, limit alcohol, caffeine, spicy foods, and acidic foods and limiting fluid intake 3-4 hours before bed. Patient had symptoms of bladder infection earlier got resolved at that time she was having some issues with incontinence but currently she has no problems with passing urine no incontinence stable.       Preventive health issues were discussed with patient, and age appropriate screening tests were ordered as noted in patient's After Visit Summary. Personalized health advice and appropriate referrals for health education or preventive services given if needed, as noted in patient's After Visit Summary.    History of Present Illness     Patient is coming here for evaluation regarding symptoms of obesity, hypertension, hypothyroidism, DJD, osteopenia.  Patient has been trying very hard to lose weight but it is becoming very difficult.  She did have knee replacement surgeries done which also causing some limitation with her activity.  Urine analysis had shown contamination we will get a repeat 1 done medications reviewed labs reviewed       Patient Care Team:  Aviva Delgado MD as PCP - General (Internal Medicine)  Gonzalo Osorio MD as Endoscopist    Review of Systems   Constitutional:  Negative for chills and fever.   HENT:  Negative for ear pain and sore throat.    Eyes:  Negative for pain and visual disturbance.   Respiratory:  Negative for cough and shortness of breath.    Cardiovascular:  Negative for chest pain and palpitations.   Gastrointestinal:  Negative for abdominal pain and vomiting.   Genitourinary:  Negative for dysuria and hematuria.   Musculoskeletal:  Positive for arthralgias. Negative for back pain.   Skin:  Negative for color change and rash.   Neurological:  Negative for seizures and syncope.   All other systems reviewed and are  negative.    Medical History Reviewed by provider this encounter:       Annual Wellness Visit Questionnaire   Agnieszka is here for her Subsequent Wellness visit. Last Medicare Wellness visit information reviewed, patient interviewed, no change since last AWV. Last Medicare Wellness visit information reviewed, patient interviewed and updates made to the record today.      Health Risk Assessment:   Patient rates overall health as very good. Patient feels that their physical health rating is same. Patient is satisfied with their life. Eyesight was rated as same. Hearing was rated as same. Patient feels that their emotional and mental health rating is same. Patients states they are never, rarely angry. Patient states they are never, rarely unusually tired/fatigued. Pain experienced in the last 7 days has been a lot. Patient's pain rating has been 5/10. Patient states that she has experienced no weight loss or gain in last 6 months. Weight is stable    Fall Risk Screening:   In the past year, patient has experienced: no history of falling in past year      Urinary Incontinence Screening:   Patient has leaked urine accidently in the last six months. Some dribbling at times.  Patient with severe osteoarthritis of both the knees takes some time for her to go to the bathroom has been she notices slight dribbling otherwise she is not incontinent of urine.    Home Safety:  Patient does not have trouble with stairs inside or outside of their home. Patient has working smoke alarms and has working carbon monoxide detector. Home safety hazards include: none. No home safety hazards    Nutrition:   Current diet is Other (please comment). Pt states she has been trying different diets to try to lose weight    Medications:   Patient is currently taking over-the-counter supplements. OTC medications include: see medication list. Patient is able to manage medications. No problems with meds    Activities of Daily Living (ADLs)/Instrumental  Activities of Daily Living (IADLs):   Walk and transfer into and out of bed and chair?: Yes  Dress and groom yourself?: Yes    Bathe or shower yourself?: Yes    Feed yourself? Yes  Do your laundry/housekeeping?: Yes  Manage your money, pay your bills and track your expenses?: Yes  Make your own meals?: Yes    Do your own shopping?: Yes    ADL comments: Pt is independent    Previous Hospitalizations:   Any hospitalizations or ED visits within the last 12 months?: No      Hospitalization Comments: Health conditions are stable    Advance Care Planning:   Living will: No    Durable POA for healthcare: No    Advanced directive: Yes    Advanced directive counseling given: Yes    Five wishes given: No    Patient declined ACP directive: No    End of Life Decisions reviewed with patient: Yes    Provider agrees with end of life decisions: Yes      Comments: Discussed with patient regarding living will papers related to that has been given to the patient for her to review and bring them back regarding her wishes.    Cognitive Screening:   Provider or family/friend/caregiver concerned regarding cognition?: No    PREVENTIVE SCREENINGS      Cardiovascular Screening:    General: Screening Current      Diabetes Screening:     General: Screening Current      Colorectal Cancer Screening:     General: Risks and Benefits Discussed and Screening Current      Breast Cancer Screening:     General: Screening Current      Cervical Cancer Screening:    General: Screening Not Indicated      Osteoporosis Screening:    General: History Osteoporosis and Screening Current      Abdominal Aortic Aneurysm (AAA) Screening:        General: Screening Not Indicated      Lung Cancer Screening:     General: Screening Not Indicated      Hepatitis C Screening:    General: Risks and Benefits Discussed      Preventive Screening Comments: Patient gets regularly her colonoscopies    Screening, Brief Intervention, and Referral to Treatment  (SBIRT)    Screening    Typical number of drinks in a week: 0    AUDIT-C Screenin) How often did you have a drink containing alcohol in the past year? never  2) How many drinks did you have on a typical day when you were drinking in the past year? 0  3) How often did you have 6 or more drinks on one occasion in the past year? never    AUDIT-C Score: 0  Interpretation: Score 0-2 (female): Negative screen for alcohol misuse    Single Item Drug Screening:  How often have you used an illegal drug (including marijuana) or a prescription medication for non-medical reasons in the past year? never    Single Item Drug Screen Score: 0  Interpretation: Negative screen for possible drug use disorder    Brief Intervention  Alcohol & drug use screenings were reviewed. No concerns regarding substance use disorder identified.     Other Counseling Topics:   Car/seat belt/driving safety and calcium and vitamin D intake and regular weightbearing exercise.     Social Determinants of Health     Financial Resource Strain: Low Risk  (2023)    [facility], [facility]    Overall Financial Resource Strain (CARDIA)     Difficulty of Paying Living Expenses: Not hard at all   Food Insecurity: No Food Insecurity (2024)    Hunger Vital Sign     Worried About Running Out of Food in the Last Year: Never true     Ran Out of Food in the Last Year: Never true   Transportation Needs: No Transportation Needs (2024)    PRAPARE - Transportation     Lack of Transportation (Medical): No     Lack of Transportation (Non-Medical): No   Housing Stability: Low Risk  (2024)    Housing Stability Vital Sign     Unable to Pay for Housing in the Last Year: No     Number of Times Moved in the Last Year: 1     Homeless in the Last Year: No   Utilities: Not At Risk (2024)    [facility] Utilities     Threatened with loss of utilities: No     No results found.    Objective     /80 (BP Location:  "Right arm, Patient Position: Sitting, Cuff Size: Standard)   Pulse 62   Ht 5' 2\" (1.575 m)   Wt 82.7 kg (182 lb 6.4 oz)   SpO2 97%   BMI 33.36 kg/m²     Physical Exam  Vitals and nursing note reviewed.   Constitutional:       General: She is not in acute distress.     Appearance: She is well-developed. She is obese.   HENT:      Head: Normocephalic and atraumatic.   Eyes:      Conjunctiva/sclera: Conjunctivae normal.   Cardiovascular:      Rate and Rhythm: Normal rate and regular rhythm.      Heart sounds: No murmur heard.  Pulmonary:      Effort: Pulmonary effort is normal. No respiratory distress.      Breath sounds: Normal breath sounds.   Abdominal:      Palpations: Abdomen is soft.      Tenderness: There is no abdominal tenderness.   Musculoskeletal:         General: No swelling.      Cervical back: Neck supple.   Skin:     General: Skin is warm and dry.      Capillary Refill: Capillary refill takes less than 2 seconds.   Neurological:      Mental Status: She is alert.   Psychiatric:         Mood and Affect: Mood normal.     No results found for this or any previous visit (from the past 1344 hour(s)).       "

## 2024-08-01 ENCOUNTER — OFFICE VISIT (OUTPATIENT)
Dept: FAMILY MEDICINE CLINIC | Facility: CLINIC | Age: 79
End: 2024-08-01
Payer: MEDICARE

## 2024-08-01 VITALS
OXYGEN SATURATION: 97 % | HEIGHT: 62 IN | DIASTOLIC BLOOD PRESSURE: 80 MMHG | WEIGHT: 182.4 LBS | BODY MASS INDEX: 33.57 KG/M2 | SYSTOLIC BLOOD PRESSURE: 126 MMHG | HEART RATE: 62 BPM

## 2024-08-01 DIAGNOSIS — N39.0 URINARY TRACT INFECTION WITHOUT HEMATURIA, SITE UNSPECIFIED: ICD-10-CM

## 2024-08-01 DIAGNOSIS — M85.89 OSTEOPENIA OF MULTIPLE SITES: ICD-10-CM

## 2024-08-01 DIAGNOSIS — E66.09 CLASS 1 OBESITY DUE TO EXCESS CALORIES WITHOUT SERIOUS COMORBIDITY WITH BODY MASS INDEX (BMI) OF 33.0 TO 33.9 IN ADULT: Primary | ICD-10-CM

## 2024-08-01 DIAGNOSIS — Z00.00 MEDICARE ANNUAL WELLNESS VISIT, SUBSEQUENT: ICD-10-CM

## 2024-08-01 DIAGNOSIS — K21.9 GASTROESOPHAGEAL REFLUX DISEASE WITHOUT ESOPHAGITIS: ICD-10-CM

## 2024-08-01 DIAGNOSIS — E03.9 ACQUIRED HYPOTHYROIDISM: ICD-10-CM

## 2024-08-01 DIAGNOSIS — M15.9 PRIMARY OSTEOARTHRITIS INVOLVING MULTIPLE JOINTS: ICD-10-CM

## 2024-08-01 DIAGNOSIS — I10 PRIMARY HYPERTENSION: ICD-10-CM

## 2024-08-01 DIAGNOSIS — N18.31 STAGE 3A CHRONIC KIDNEY DISEASE (HCC): ICD-10-CM

## 2024-08-01 PROCEDURE — G0439 PPPS, SUBSEQ VISIT: HCPCS | Performed by: INTERNAL MEDICINE

## 2024-08-01 PROCEDURE — 99213 OFFICE O/P EST LOW 20 MIN: CPT | Performed by: INTERNAL MEDICINE

## 2024-08-01 RX ORDER — SEMAGLUTIDE 0.25 MG/.5ML
INJECTION, SOLUTION SUBCUTANEOUS
Qty: 2 ML | Refills: 0 | Status: SHIPPED | OUTPATIENT
Start: 2024-08-01

## 2024-08-01 NOTE — ASSESSMENT & PLAN NOTE
Patient with bilateral knee replacements still has some discomfort feeling limitation with movements takes Tylenol as needed.  The knee discomfort also prevents her from moving around much

## 2024-08-01 NOTE — ASSESSMENT & PLAN NOTE
Patient BMI is 33.36 slightly less compared to before but still high emphasized regarding lifestyle modification diet exercise.  Had a lengthy discussion with patient regarding various options she would prefer to see if she can have the Wegovy injection to help her lose weight will order the same and see if insurance company approves it.

## 2024-08-01 NOTE — ASSESSMENT & PLAN NOTE
Lab Results   Component Value Date    EGFR 60 05/06/2024    EGFR 66 12/08/2023    EGFR 58 11/09/2023    CREATININE 0.92 05/06/2024    CREATININE 0.90 12/08/2023    CREATININE 0.95 11/09/2023   GFR is at 60 creatinine 0.92 stable

## 2024-08-02 ENCOUNTER — LAB (OUTPATIENT)
Dept: LAB | Facility: HOSPITAL | Age: 79
End: 2024-08-02
Attending: INTERNAL MEDICINE
Payer: MEDICARE

## 2024-08-02 ENCOUNTER — TELEPHONE (OUTPATIENT)
Dept: FAMILY MEDICINE CLINIC | Facility: CLINIC | Age: 79
End: 2024-08-02

## 2024-08-02 DIAGNOSIS — N39.0 URINARY TRACT INFECTION WITHOUT HEMATURIA, SITE UNSPECIFIED: Primary | ICD-10-CM

## 2024-08-02 DIAGNOSIS — N39.0 URINARY TRACT INFECTION WITHOUT HEMATURIA, SITE UNSPECIFIED: ICD-10-CM

## 2024-08-02 LAB
BACTERIA UR QL AUTO: ABNORMAL /HPF
BILIRUB UR QL STRIP: NEGATIVE
CLARITY UR: CLEAR
COLOR UR: YELLOW
GLUCOSE UR STRIP-MCNC: NEGATIVE MG/DL
HGB UR QL STRIP.AUTO: ABNORMAL
KETONES UR STRIP-MCNC: NEGATIVE MG/DL
LEUKOCYTE ESTERASE UR QL STRIP: ABNORMAL
NITRITE UR QL STRIP: NEGATIVE
NON-SQ EPI CELLS URNS QL MICRO: ABNORMAL /HPF
PH UR STRIP.AUTO: 5.5 [PH]
PROT UR STRIP-MCNC: NEGATIVE MG/DL
RBC #/AREA URNS AUTO: ABNORMAL /HPF
SP GR UR STRIP.AUTO: 1.02 (ref 1–1.03)
UROBILINOGEN UR STRIP-ACNC: <2 MG/DL
WBC #/AREA URNS AUTO: ABNORMAL /HPF
WBC CLUMPS # UR AUTO: ABNORMAL /UL

## 2024-08-02 PROCEDURE — 81001 URINALYSIS AUTO W/SCOPE: CPT

## 2024-08-02 PROCEDURE — 87086 URINE CULTURE/COLONY COUNT: CPT

## 2024-08-02 PROCEDURE — 87186 SC STD MICRODIL/AGAR DIL: CPT

## 2024-08-02 PROCEDURE — 87077 CULTURE AEROBIC IDENTIFY: CPT

## 2024-08-02 RX ORDER — CEPHALEXIN 500 MG/1
500 CAPSULE ORAL 3 TIMES DAILY
Qty: 21 CAPSULE | Refills: 0 | Status: SHIPPED | OUTPATIENT
Start: 2024-08-02 | End: 2024-08-09

## 2024-08-02 NOTE — TELEPHONE ENCOUNTER
Patient called asks for urine results. She is in discomfort.Asks for script to be sent to [facility].

## 2024-08-04 LAB — BACTERIA UR CULT: ABNORMAL

## 2024-08-04 NOTE — RESULT ENCOUNTER NOTE
Inform patient her urine culture had shown urinary tract infection with bacteria and she should continue with the antibiotics I have prescribed.  Recommend patient to drink plenty of water

## 2024-08-05 ENCOUNTER — TELEPHONE (OUTPATIENT)
Dept: FAMILY MEDICINE CLINIC | Facility: CLINIC | Age: 79
End: 2024-08-05

## 2024-08-05 NOTE — TELEPHONE ENCOUNTER
----- Message from Aviva Delgado MD sent at 8/4/2024  5:01 PM EDT -----  Inform patient her urine culture had shown urinary tract infection with bacteria and she should continue with the antibiotics I have prescribed.  Recommend patient to drink plenty of water      08/05/2024  called pt and informed her of her results and what ronnie barrera noted

## 2024-08-12 ENCOUNTER — HOSPITAL ENCOUNTER (OUTPATIENT)
Dept: RADIOLOGY | Facility: HOSPITAL | Age: 79
Discharge: HOME | End: 2024-08-12
Attending: INTERNAL MEDICINE
Payer: MEDICARE

## 2024-08-12 VITALS — WEIGHT: 180 LBS | HEIGHT: 62 IN | BODY MASS INDEX: 33.13 KG/M2

## 2024-08-12 DIAGNOSIS — Z12.31 SCREENING MAMMOGRAM FOR BREAST CANCER: ICD-10-CM

## 2024-08-12 PROCEDURE — 77063 BREAST TOMOSYNTHESIS BI: CPT

## 2024-08-12 PROCEDURE — 77067 SCR MAMMO BI INCL CAD: CPT

## 2024-08-14 ENCOUNTER — TELEPHONE (OUTPATIENT)
Age: 79
End: 2024-08-14

## 2024-08-14 ENCOUNTER — TELEPHONE (OUTPATIENT)
Dept: FAMILY MEDICINE CLINIC | Facility: CLINIC | Age: 79
End: 2024-08-14

## 2024-08-14 NOTE — TELEPHONE ENCOUNTER
PA for (Wegovy) 0.25 MG/0.5ML SUBMITTED     via    []CMM-KEY:   []Jarrettritan-Case ID #   []Faxed to plan   [x]Other website [facility] - [id number]  []Phone call Case ID #     Office notes sent, clinical questions answered. Awaiting determination    Turnaround time for your insurance to make a decision on your Prior Authorization can take 7-21 business days.

## 2024-08-14 NOTE — TELEPHONE ENCOUNTER
----- Message from Aviva Delgado MD sent at 8/14/2024  3:32 PM EDT -----  Inform patient her mammogram came back normal

## 2024-08-15 NOTE — TELEPHONE ENCOUNTER
PA for Semaglutide-Weight Management (Wegovy) 0.25 MG/0.5ML Denied / EXCLUDED     Reason: PLAN EXCLUSION        Message sent to office clinical pool Yes    Denial letter scanned into Media Yes      **Please follow up with your patient regarding denial and next steps**

## 2024-08-15 NOTE — TELEPHONE ENCOUNTER
Pt was informed called pharmacy and told them she is paying out of pocket discount code was given to [facility]

## 2024-09-04 ENCOUNTER — RA CDI HCC (OUTPATIENT)
Dept: OTHER | Facility: HOSPITAL | Age: 79
End: 2024-09-04

## 2024-09-09 NOTE — PROGRESS NOTES
Office Visit Note  09/10/24     Agnieszka Camejo 79 y.o. female MRN: [id number]  : 1945    Assessment:     1. Class 1 obesity due to excess calories without serious comorbidity with body mass index (BMI) of 33.0 to 33.9 in adult  Assessment & Plan:  Patient's BMI is 32.52 it was 33.56 before and her weight is 177 pounds.  Patient had lost about 6 pounds in last 1 month time.  Currently he is on Wegovy 0.25 mg weekly we will renew the medication to 0.5 mg weekly and follow-up in 1 month time.  Orders:  -     Semaglutide-Weight Management (Wegovy) 0.5 MG/0.5ML; Inject 0.5 mg under the skin weekly  2. Gastroesophageal reflux disease without esophagitis  Assessment & Plan:  Patient with a history of GERD on omeprazole we will continue the same without that medication she gets increased symptoms of reflux.  3. Primary hypertension  Assessment & Plan:  Patient is on metoprolol 50 mg half a tablet twice a day will continue the same today's blood pressure is 120/72.  4. Acquired hypothyroidism  Assessment & Plan:  Patient's last TSH was normal at 3.196 currently on levothyroxine 25 mcg daily continue  5. Primary osteoarthritis involving multiple joints  Assessment & Plan:  Patient status post bilateral knee replacements still has some discomfort feeling in the knees she can try over-the-counter Voltaren gel for the joint pains  6. Stage 3a chronic kidney disease (HCC)  Assessment & Plan:  Lab Results   Component Value Date    EGFR 60 2024    EGFR 66 2023    EGFR 58 2023    CREATININE 0.92 2024    CREATININE 0.90 2023    CREATININE 0.95 2023   Patient GFR is about 60 with a creatinine of 0.92 will monitor.             Discussion Summary and Plan:  Today's care plan and medications were reviewed with patient in detail and all their questions answered to their satisfaction.    Chief Complaint   Patient presents with    Follow-up    Wrist Pain     Left wrist       Subjective:  Patient  is coming here for a follow-up evaluation with regards to the symptoms of obesity for which she was given Wegovy 0.25 mg and she has lost about 6 pounds in last 1 month time.  Patient also has got history of hypertension, hypothyroidism, osteoarthritis and GERD.  Medications reviewed labs reviewed.        The following portions of the patient's history were reviewed and updated as appropriate: allergies, current medications, past family history, past medical history, past social history, past surgical history and problem list.    Review of Systems   Constitutional:  Negative for chills and fever.   HENT:  Negative for ear pain and sore throat.    Eyes:  Negative for pain and visual disturbance.   Respiratory:  Negative for cough and shortness of breath.    Cardiovascular:  Negative for chest pain and palpitations.   Gastrointestinal:  Negative for abdominal pain and vomiting.   Genitourinary:  Negative for dysuria and hematuria.   Musculoskeletal:  Positive for arthralgias. Negative for back pain.   Skin:  Negative for color change and rash.   Neurological:  Negative for seizures and syncope.   All other systems reviewed and are negative.        Historical Information   Patient Active Problem List   Diagnosis    HTN (hypertension)    Hypothyroidism    Gastroesophageal reflux disease    Osteoarthritis of multiple joints    Stage 3a chronic kidney disease (HCC)    Class 1 obesity due to excess calories without serious comorbidity in adult    Osteopenia of multiple sites    Palpitations     Past Medical History:   Diagnosis Date    Arthritis     left knee injection-2017    Disease of thyroid gland     hypothyroidism    DJD (degenerative joint disease)     GERD (gastroesophageal reflux disease)     Hypertension     Seasonal allergies     Wears glasses      Past Surgical History:   Procedure Laterality Date    BREAST BIOPSY Left 2014    BREAST CYST EXCISION Left     barely visible    BREAST SURGERY Left     x2 bx-benign      SECTION  1966    x1    COLONOSCOPY      age 67    COLONOSCOPY N/A 2017    Procedure: COLONOSCOPY;  Surgeon: Gonzalo Osorio MD;  Location: [facility];  Service:     ESOPHAGOGASTRODUODENOSCOPY N/A 2017    Procedure: ESOPHAGOGASTRODUODENOSCOPY (EGD);  Surgeon: Gonzalo Osorio MD;  Location: [facility];  Service:     ESOPHAGOGASTRODUODENOSCOPY N/A 2017    Procedure: ESOPHAGOGASTRODUODENOSCOPY (EGD);  Surgeon: Gonzalo Osorio MD;  Location: [facility];  Service: Gastroenterology    HERNIA REPAIR      incisional hernia right side    HYSTERECTOMY  1966    partial- ovaries remain    KNEE ARTHROSCOPY Left     torn meniscus    LIPOMA RESECTION      lower right abdomen    [place] EGD TRANSORAL BIOPSY SINGLE/MULTIPLE N/A 2018    Procedure: ESOPHAGOGASTRODUODENOSCOPY (EGD);  Surgeon: Gonzalo Osorio MD;  Location: [facility];  Service: Gastroenterology    ROTATOR CUFF REPAIR Left     SHOULDER ARTHROSCOPY W/ ROTATOR CUFF REPAIR Left     TRIGGER FINGER RELEASE Right     thumb     Social History     Substance and Sexual Activity   Alcohol Use No     Social History     Substance and Sexual Activity   Drug Use No     Social History     Tobacco Use   Smoking Status Never    Passive exposure: Never   Smokeless Tobacco Never     Family History   Problem Relation Age of Onset    Hepatitis Brother         hep C from [facility]     [facility] Due   Topic    Hepatitis C Screening     Zoster Vaccine (1 of 2)    RSV Vaccine Age 60+ Years (1 - 1-dose 60+ series)    Pneumococcal Vaccine: 65+ Years (1 of 1 - PCV)    COVID-19 Vaccine (2 - - season)    Influenza Vaccine (1)      Meds/Allergies       Current Outpatient Medications:     calcium carbonate-vitamin D 500 mg-5 mcg per tablet, Take 1 tablet by mouth every morning, Disp: , Rfl:     levothyroxine 25 mcg tablet, Take 1 tablet (25 mcg total) by mouth every morning, Disp: 90 tablet, Rfl: 1    metoprolol tartrate (LOPRESSOR) 50 mg tablet, Take  "half a tablet of the 50 mg in the morning and half a tablet of 50 mg in the evening, Disp: 90 tablet, Rfl: 1    Multiple Vitamin (multivitamin) tablet, Take 1 tablet by mouth daily, Disp: , Rfl:     omeprazole (PriLOSEC) 20 mg delayed release capsule, One tablet by mouth 1/2 hour before breakfast daily, Disp: 90 capsule, Rfl: 1    Semaglutide-Weight Management (Wegovy) 0.5 MG/0.5ML, Inject 0.5 mg under the skin weekly, Disp: 2 mL, Rfl: 0      Objective:    Vitals:   /72 (BP Location: Right arm, Patient Position: Sitting, Cuff Size: Standard)   Pulse 72   Ht 5' 2\" (1.575 m)   Wt 80.6 kg (177 lb 12.8 oz)   SpO2 98%   BMI 32.52 kg/m²   Body mass index is 32.52 kg/m².  Vitals:    09/10/24 1227   Weight: 80.6 kg (177 lb 12.8 oz)       Physical Exam  Vitals and nursing note reviewed.   Constitutional:       Appearance: Normal appearance. She is obese.   Cardiovascular:      Rate and Rhythm: Normal rate and regular rhythm.      Heart sounds: Normal heart sounds.   Pulmonary:      Effort: Pulmonary effort is normal.      Breath sounds: Normal breath sounds.   Abdominal:      Palpations: Abdomen is soft.   Musculoskeletal:      Cervical back: Normal range of motion and neck supple.      Right lower leg: No edema.      Left lower leg: No edema.   Skin:     General: Skin is warm and dry.   Neurological:      Mental Status: She is alert and oriented to person, place, and time.   Psychiatric:         Mood and Affect: Mood normal.         Behavior: Behavior normal.         Lab Review   Lab on 08/02/2024   Component Date Value Ref Range Status    Color, UA 08/02/2024 Yellow   Final    Clarity, UA 08/02/2024 Clear   Final    Specific Gravity, UA 08/02/2024 1.020  1.005 - 1.030 Final    pH, UA 08/02/2024 5.5  4.5, 5.0, 5.5, 6.0, 6.5, 7.0, 7.5, 8.0 Final    Leukocytes, UA 08/02/2024 Large (A)  Negative Final    Nitrite, UA 08/02/2024 Negative  Negative Final    Protein, UA 08/02/2024 Negative  Negative mg/dl Final    " "Glucose, UA 08/02/2024 Negative  Negative mg/dl Final    Ketones, UA 08/02/2024 Negative  Negative mg/dl Final    Urobilinogen, UA 08/02/2024 <2.0  <2.0 mg/dl mg/dl Final    Bilirubin, UA 08/02/2024 Negative  Negative Final    Occult Blood, UA 08/02/2024 Trace (A)  Negative Final    RBC, UA 08/02/2024 0-1  None Seen, 0-1, 1-2, 2-4, 0-5 /hpf Final    WBC, UA 08/02/2024 10-20 (A)  None Seen, 0-1, 1-2, 0-5, 2-4 /hpf Final    Epithelial Cells 08/02/2024 Occasional  None Seen, Occasional /hpf Final    Bacteria, UA 08/02/2024 Innumerable (A)  None Seen, Occasional /hpf Final    WBC Clumps 08/02/2024 occ   Final    Urine Culture 08/02/2024 >100,000 cfu/ml Escherichia coli (A)   Final    This organism has been edited. The previous result was Gram Negative Hans Enteric Like on 8/3/2024 at 0926 EDT.         Aviva Delgado MD        \"This note has been constructed using a voice recognition system.Therefore there may be syntax, spelling, and/or grammatical errors. Please call if you have any questions. \"  "

## 2024-09-10 ENCOUNTER — OFFICE VISIT (OUTPATIENT)
Dept: FAMILY MEDICINE CLINIC | Facility: CLINIC | Age: 79
End: 2024-09-10
Payer: MEDICARE

## 2024-09-10 VITALS
HEIGHT: 62 IN | SYSTOLIC BLOOD PRESSURE: 120 MMHG | DIASTOLIC BLOOD PRESSURE: 72 MMHG | HEART RATE: 72 BPM | BODY MASS INDEX: 32.72 KG/M2 | OXYGEN SATURATION: 98 % | WEIGHT: 177.8 LBS

## 2024-09-10 DIAGNOSIS — E66.09 CLASS 1 OBESITY DUE TO EXCESS CALORIES WITHOUT SERIOUS COMORBIDITY WITH BODY MASS INDEX (BMI) OF 33.0 TO 33.9 IN ADULT: Primary | ICD-10-CM

## 2024-09-10 DIAGNOSIS — I10 PRIMARY HYPERTENSION: ICD-10-CM

## 2024-09-10 DIAGNOSIS — K21.9 GASTROESOPHAGEAL REFLUX DISEASE WITHOUT ESOPHAGITIS: ICD-10-CM

## 2024-09-10 DIAGNOSIS — N18.31 STAGE 3A CHRONIC KIDNEY DISEASE (HCC): ICD-10-CM

## 2024-09-10 DIAGNOSIS — E03.9 ACQUIRED HYPOTHYROIDISM: ICD-10-CM

## 2024-09-10 DIAGNOSIS — M15.9 PRIMARY OSTEOARTHRITIS INVOLVING MULTIPLE JOINTS: ICD-10-CM

## 2024-09-10 PROCEDURE — 99213 OFFICE O/P EST LOW 20 MIN: CPT | Performed by: INTERNAL MEDICINE

## 2024-09-10 PROCEDURE — G2211 COMPLEX E/M VISIT ADD ON: HCPCS | Performed by: INTERNAL MEDICINE

## 2024-09-10 RX ORDER — SEMAGLUTIDE 0.5 MG/.5ML
INJECTION, SOLUTION SUBCUTANEOUS
Qty: 2 ML | Refills: 0 | Status: SHIPPED | OUTPATIENT
Start: 2024-09-10

## 2024-09-10 NOTE — ASSESSMENT & PLAN NOTE
Patient with a history of GERD on omeprazole we will continue the same without that medication she gets increased symptoms of reflux.

## 2024-09-10 NOTE — ASSESSMENT & PLAN NOTE
Lab Results   Component Value Date    EGFR 60 05/06/2024    EGFR 66 12/08/2023    EGFR 58 11/09/2023    CREATININE 0.92 05/06/2024    CREATININE 0.90 12/08/2023    CREATININE 0.95 11/09/2023   Patient GFR is about 60 with a creatinine of 0.92 will monitor.

## 2024-09-10 NOTE — ASSESSMENT & PLAN NOTE
Patient's BMI is 32.52 it was 33.56 before and her weight is 177 pounds.  Patient had lost about 6 pounds in last 1 month time.  Currently he is on Wegovy 0.25 mg weekly we will renew the medication to 0.5 mg weekly and follow-up in 1 month time.

## 2024-09-10 NOTE — ASSESSMENT & PLAN NOTE
Patient status post bilateral knee replacements still has some discomfort feeling in the knees she can try over-the-counter Voltaren gel for the joint pains

## 2024-09-10 NOTE — ASSESSMENT & PLAN NOTE
Patient is on metoprolol 50 mg half a tablet twice a day will continue the same today's blood pressure is 120/72.

## 2024-10-07 ENCOUNTER — TELEPHONE (OUTPATIENT)
Age: 79
End: 2024-10-07

## 2024-10-07 DIAGNOSIS — E66.09 CLASS 1 OBESITY DUE TO EXCESS CALORIES WITHOUT SERIOUS COMORBIDITY WITH BODY MASS INDEX (BMI) OF 33.0 TO 33.9 IN ADULT: Primary | ICD-10-CM

## 2024-10-07 DIAGNOSIS — I10 PRIMARY HYPERTENSION: ICD-10-CM

## 2024-10-07 DIAGNOSIS — E66.811 CLASS 1 OBESITY DUE TO EXCESS CALORIES WITHOUT SERIOUS COMORBIDITY WITH BODY MASS INDEX (BMI) OF 33.0 TO 33.9 IN ADULT: Primary | ICD-10-CM

## 2024-10-07 DIAGNOSIS — K21.00 GASTROESOPHAGEAL REFLUX DISEASE WITH ESOPHAGITIS WITHOUT HEMORRHAGE: ICD-10-CM

## 2024-10-07 DIAGNOSIS — E03.9 ACQUIRED HYPOTHYROIDISM: ICD-10-CM

## 2024-10-07 RX ORDER — LEVOTHYROXINE SODIUM 25 UG/1
25 TABLET ORAL EVERY MORNING
Qty: 90 TABLET | Refills: 1 | Status: SHIPPED | OUTPATIENT
Start: 2024-10-07

## 2024-10-07 RX ORDER — SEMAGLUTIDE 1 MG/.5ML
INJECTION, SOLUTION SUBCUTANEOUS
Qty: 2 ML | Refills: 0 | Status: SHIPPED | OUTPATIENT
Start: 2024-10-07

## 2024-10-07 RX ORDER — METOPROLOL TARTRATE 50 MG
TABLET ORAL
Qty: 90 TABLET | Refills: 1 | Status: SHIPPED | OUTPATIENT
Start: 2024-10-07

## 2024-10-07 NOTE — TELEPHONE ENCOUNTER
Patient called the RX Refill Line. Message is being forwarded to the office.     Patient is requesting a refill on Semaglutide-Weight Management (Wegovy) 0.5 MG/0.5ML.  she is asking for the next dose up. If appropriate, please send to [facility]    Please contact patient at [phone] with any questions.

## 2024-10-17 NOTE — PROGRESS NOTES
Office Visit Note  10/18/24     Agnieszka Camejo 79 y.o. female MRN: [id number]  : 1945    Assessment:     1. Class 1 obesity due to excess calories without serious comorbidity with body mass index (BMI) of 33.0 to 33.9 in adult  Assessment & Plan:    As mentioned in the history of present illness patient has taken couple of months Wegovy injection 1 mg and has lost about 8 pounds he would like to continue for 1 more month and then stop it he is also looking into Topamax.  Will start with 25 mg daily after 1 week she will start taking twice a day side effects explained to the patient she should stop it in case she develops any.  Also recommended patient to look into the weight management clinic consultation she will let me know.  Orders:  -     topiramate (Topamax) 25 mg tablet; Take 1 tablet (25 mg total) by mouth 2 (two) times a day  2. Gastroesophageal reflux disease without esophagitis  Assessment & Plan:  Currently on omeprazole we will continue with the same  3. Primary hypertension  Assessment & Plan:  Patient is on metoprolol 50 mg half a tablet twice a day will continue to do his blood pressure is 122/75.  4. Acquired hypothyroidism  Assessment & Plan:  Patient's TSH level is normal at 3.196 on levothyroxine 25 mcg daily we will continue the same dose  5. Primary osteoarthritis involving multiple joints  Assessment & Plan:  Symptoms are gradually better with her bilateral knee replacement postoperative pain  6. Stage 3a chronic kidney disease (HCC)  Assessment & Plan:  Lab Results   Component Value Date    EGFR 60 2024    EGFR 66 2023    EGFR 58 2023    CREATININE 0.92 2024    CREATININE 0.90 2023    CREATININE 0.95 2023   Last GFR is at 60 with a creatinine of 0.92 will monitor with periodic labs             Discussion Summary and Plan:  Today's care plan and medications were reviewed with patient in detail and all their questions answered to their  satisfaction.    Chief Complaint   Patient presents with    Follow-up      Subjective:  Patient is coming here for a follow-up evaluation with regards to the weight currently she is taking Wegovy and has lost about 8 pounds in the last couple of months time.  He wants to take for 1 more month and then stop it.  History of hypertension and hypothyroidism, DJD.  Medications reviewed labs reviewed.        The following portions of the patient's history were reviewed and updated as appropriate: allergies, current medications, past family history, past medical history, past social history, past surgical history and problem list.    Review of Systems   Constitutional:  Negative for chills and fever.   HENT:  Negative for ear pain and sore throat.    Eyes:  Negative for pain and visual disturbance.   Respiratory:  Negative for cough and shortness of breath.    Cardiovascular:  Negative for chest pain and palpitations.   Gastrointestinal:  Negative for abdominal pain and vomiting.   Genitourinary:  Negative for dysuria and hematuria.   Musculoskeletal:  Positive for arthralgias. Negative for back pain.   Skin:  Negative for color change and rash.   Neurological:  Negative for seizures and syncope.   All other systems reviewed and are negative.        Historical Information   Patient Active Problem List   Diagnosis    HTN (hypertension)    Hypothyroidism    Gastroesophageal reflux disease    Osteoarthritis of multiple joints    Stage 3a chronic kidney disease (HCC)    Class 1 obesity due to excess calories without serious comorbidity in adult    Osteopenia of multiple sites    Palpitations     Past Medical History:   Diagnosis Date    Arthritis     left knee injection-2017    Disease of thyroid gland     hypothyroidism    DJD (degenerative joint disease)     GERD (gastroesophageal reflux disease)     Hypertension     Seasonal allergies     Wears glasses      Past Surgical History:   Procedure Laterality Date    BREAST BIOPSY  Left 2014    BREAST CYST EXCISION Left     barely visible    BREAST SURGERY Left     x2 bx-benign     SECTION  1966    x1    COLONOSCOPY      age 67    COLONOSCOPY N/A 2017    Procedure: COLONOSCOPY;  Surgeon: Gonzalo Osorio MD;  Location: [facility];  Service:     ESOPHAGOGASTRODUODENOSCOPY N/A 2017    Procedure: ESOPHAGOGASTRODUODENOSCOPY (EGD);  Surgeon: Gonzalo Osorio MD;  Location: [facility];  Service:     ESOPHAGOGASTRODUODENOSCOPY N/A 2017    Procedure: ESOPHAGOGASTRODUODENOSCOPY (EGD);  Surgeon: Gonzalo Osorio MD;  Location: [facility];  Service: Gastroenterology    HERNIA REPAIR      incisional hernia right side    HYSTERECTOMY  1966    partial- ovaries remain    KNEE ARTHROSCOPY Left     torn meniscus    LIPOMA RESECTION      lower right abdomen    [place] EGD TRANSORAL BIOPSY SINGLE/MULTIPLE N/A 2018    Procedure: ESOPHAGOGASTRODUODENOSCOPY (EGD);  Surgeon: Gonzalo Osorio MD;  Location: [facility];  Service: Gastroenterology    ROTATOR CUFF REPAIR Left     SHOULDER ARTHROSCOPY W/ ROTATOR CUFF REPAIR Left     TRIGGER FINGER RELEASE Right     thumb     Social History     Substance and Sexual Activity   Alcohol Use No     Social History     Substance and Sexual Activity   Drug Use No     Social History     Tobacco Use   Smoking Status Never    Passive exposure: Never   Smokeless Tobacco Never     Family History   Problem Relation Age of Onset    Hepatitis Brother         hep C from [facility]     [facility] Due   Topic    Hepatitis C Screening     Zoster Vaccine (1 of 2)    RSV Vaccine Age 60+ Years (1 - 1-dose 60+ series)    Pneumococcal Vaccine: 65+ Years (1 of 1 - PCV)    Influenza Vaccine (1)    COVID-19 Vaccine (2 -  season)      Meds/Allergies       Current Outpatient Medications:     calcium carbonate-vitamin D 500 mg-5 mcg per tablet, Take 1 tablet by mouth every morning, Disp: , Rfl:     levothyroxine 25 mcg tablet, Take 1 tablet (25 mcg total) by  "mouth every morning, Disp: 90 tablet, Rfl: 1    metoprolol tartrate (LOPRESSOR) 50 mg tablet, Take half a tablet of the 50 mg in the morning and half a tablet of 50 mg in the evening, Disp: 90 tablet, Rfl: 1    Multiple Vitamin (multivitamin) tablet, Take 1 tablet by mouth daily, Disp: , Rfl:     omeprazole (PriLOSEC) 20 mg delayed release capsule, One tablet by mouth 1/2 hour before breakfast daily, Disp: 90 capsule, Rfl: 1    Semaglutide-Weight Management (Wegovy) 1 MG/0.5ML, Inject 1 mg under the skin weekly, Disp: 2 mL, Rfl: 0    topiramate (Topamax) 25 mg tablet, Take 1 tablet (25 mg total) by mouth 2 (two) times a day, Disp: 60 tablet, Rfl: 1      Objective:    Vitals:   /75 (BP Location: Right arm, Patient Position: Sitting, Cuff Size: Standard)   Pulse 84   Ht 5' 2\" (1.575 m)   Wt 78 kg (172 lb)   SpO2 96%   BMI 31.46 kg/m²   Body mass index is 31.46 kg/m².  Vitals:    10/18/24 0839   Weight: 78 kg (172 lb)       Physical Exam  Vitals and nursing note reviewed.   Constitutional:       Appearance: Normal appearance.   Cardiovascular:      Rate and Rhythm: Normal rate and regular rhythm.      Heart sounds: Normal heart sounds.   Pulmonary:      Effort: Pulmonary effort is normal.      Breath sounds: Normal breath sounds.   Abdominal:      Palpations: Abdomen is soft.   Musculoskeletal:         General: Normal range of motion.      Cervical back: Normal range of motion and neck supple.      Right lower leg: No edema.      Left lower leg: No edema.   Skin:     General: Skin is warm and dry.      Capillary Refill: Capillary refill takes less than 2 seconds.   Neurological:      General: No focal deficit present.      Mental Status: She is alert and oriented to person, place, and time.   Psychiatric:         Mood and Affect: Mood normal.         Behavior: Behavior normal.         Lab Review   No visits with results within 2 Month(s) from this visit.   Latest known visit with results is:   Lab on " "08/02/2024   Component Date Value Ref Range Status    Color, UA 08/02/2024 Yellow   Final    Clarity, UA 08/02/2024 Clear   Final    Specific Gravity, UA 08/02/2024 1.020  1.005 - 1.030 Final    pH, UA 08/02/2024 5.5  4.5, 5.0, 5.5, 6.0, 6.5, 7.0, 7.5, 8.0 Final    Leukocytes, UA 08/02/2024 Large (A)  Negative Final    Nitrite, UA 08/02/2024 Negative  Negative Final    Protein, UA 08/02/2024 Negative  Negative mg/dl Final    Glucose, UA 08/02/2024 Negative  Negative mg/dl Final    Ketones, UA 08/02/2024 Negative  Negative mg/dl Final    Urobilinogen, UA 08/02/2024 <2.0  <2.0 mg/dl mg/dl Final    Bilirubin, UA 08/02/2024 Negative  Negative Final    Occult Blood, UA 08/02/2024 Trace (A)  Negative Final    RBC, UA 08/02/2024 0-1  None Seen, 0-1, 1-2, 2-4, 0-5 /hpf Final    WBC, UA 08/02/2024 10-20 (A)  None Seen, 0-1, 1-2, 0-5, 2-4 /hpf Final    Epithelial Cells 08/02/2024 Occasional  None Seen, Occasional /hpf Final    Bacteria, UA 08/02/2024 Innumerable (A)  None Seen, Occasional /hpf Final    WBC Clumps 08/02/2024 occ   Final    Urine Culture 08/02/2024 >100,000 cfu/ml Escherichia coli (A)   Final    This organism has been edited. The previous result was Gram Negative Hans Enteric Like on 8/3/2024 at 0926 EDT.         Aviva Delgado MD        \"This note has been constructed using a voice recognition system.Therefore there may be syntax, spelling, and/or grammatical errors. Please call if you have any questions. \"  "

## 2024-10-18 ENCOUNTER — OFFICE VISIT (OUTPATIENT)
Dept: FAMILY MEDICINE CLINIC | Facility: CLINIC | Age: 79
End: 2024-10-18
Payer: MEDICARE

## 2024-10-18 VITALS
HEART RATE: 84 BPM | BODY MASS INDEX: 31.65 KG/M2 | WEIGHT: 172 LBS | DIASTOLIC BLOOD PRESSURE: 75 MMHG | HEIGHT: 62 IN | OXYGEN SATURATION: 96 % | SYSTOLIC BLOOD PRESSURE: 122 MMHG

## 2024-10-18 DIAGNOSIS — M15.0 PRIMARY OSTEOARTHRITIS INVOLVING MULTIPLE JOINTS: ICD-10-CM

## 2024-10-18 DIAGNOSIS — I10 PRIMARY HYPERTENSION: ICD-10-CM

## 2024-10-18 DIAGNOSIS — E03.9 ACQUIRED HYPOTHYROIDISM: ICD-10-CM

## 2024-10-18 DIAGNOSIS — E66.09 CLASS 1 OBESITY DUE TO EXCESS CALORIES WITHOUT SERIOUS COMORBIDITY WITH BODY MASS INDEX (BMI) OF 33.0 TO 33.9 IN ADULT: Primary | ICD-10-CM

## 2024-10-18 DIAGNOSIS — K21.9 GASTROESOPHAGEAL REFLUX DISEASE WITHOUT ESOPHAGITIS: ICD-10-CM

## 2024-10-18 DIAGNOSIS — N18.31 STAGE 3A CHRONIC KIDNEY DISEASE (HCC): ICD-10-CM

## 2024-10-18 DIAGNOSIS — E66.811 CLASS 1 OBESITY DUE TO EXCESS CALORIES WITHOUT SERIOUS COMORBIDITY WITH BODY MASS INDEX (BMI) OF 33.0 TO 33.9 IN ADULT: Primary | ICD-10-CM

## 2024-10-18 PROCEDURE — G2211 COMPLEX E/M VISIT ADD ON: HCPCS | Performed by: INTERNAL MEDICINE

## 2024-10-18 PROCEDURE — 99214 OFFICE O/P EST MOD 30 MIN: CPT | Performed by: INTERNAL MEDICINE

## 2024-10-18 RX ORDER — TOPIRAMATE 25 MG/1
25 TABLET, FILM COATED ORAL 2 TIMES DAILY
Qty: 60 TABLET | Refills: 1 | Status: SHIPPED | OUTPATIENT
Start: 2024-10-18

## 2024-10-18 NOTE — ASSESSMENT & PLAN NOTE
Patient is on metoprolol 50 mg half a tablet twice a day will continue to do his blood pressure is 122/75.

## 2024-10-18 NOTE — ASSESSMENT & PLAN NOTE
Lab Results   Component Value Date    EGFR 60 05/06/2024    EGFR 66 12/08/2023    EGFR 58 11/09/2023    CREATININE 0.92 05/06/2024    CREATININE 0.90 12/08/2023    CREATININE 0.95 11/09/2023   Last GFR is at 60 with a creatinine of 0.92 will monitor with periodic labs

## 2024-10-18 NOTE — ASSESSMENT & PLAN NOTE
Patient's TSH level is normal at 3.196 on levothyroxine 25 mcg daily we will continue the same dose

## 2024-10-18 NOTE — ASSESSMENT & PLAN NOTE
As mentioned in the history of present illness patient has taken couple of months Wegovy injection 1 mg and has lost about 8 pounds he would like to continue for 1 more month and then stop it he is also looking into Topamax.  Will start with 25 mg daily after 1 week she will start taking twice a day side effects explained to the patient she should stop it in case she develops any.  Also recommended patient to look into the weight management clinic consultation she will let me know.

## 2024-11-21 NOTE — PROGRESS NOTES
Office Visit Note  24     Agnieszka Camejo 79 y.o. female MRN: [id number]  : 1945    Assessment:     1. Class 1 obesity due to excess calories without serious comorbidity with body mass index (BMI) of 30.0 to 30.9 in adult  Assessment & Plan:     patient has lost about 11 pounds in 3 months time after being on Wegovy initially 0.25 mg subsequently 0.5 mg.  Recommend patient to continue with diet exercise weight loss management cutting back on carbohydrate intake.  2. Gastroesophageal reflux disease with esophagitis without hemorrhage  Assessment & Plan:  Patient did not have any GERD symptoms after she stopped taking the omeprazole however there was a concern about pathology report of the upper endoscopy done in  and a follow-up was recommended in 1 year however patient did not go for the repeat endoscopy in 1 years time send will reorder it discussed with patient to get it done and resume the omeprazole also until we have a endoscopy done.  Recommend patient not eat late in the night and keep the head of the bed up if his symptoms recur.  Orders:  -     omeprazole (PriLOSEC) 20 mg delayed release capsule; One tablet by mouth 1/2 hour before breakfast daily  -     Ambulatory referral to Gastroenterology; Future  3. Acquired hypothyroidism  Assessment & Plan:  Patient with hypothyroidism currently taking 25 mcg of levothyroxine we will continue the same follow-up with repeat TSH level in few months  Orders:  -     levothyroxine 25 mcg tablet; Take 1 tablet (25 mcg total) by mouth every morning  4. Primary hypertension  Assessment & Plan:  Patient blood pressure is stable 120/74 continue with metoprolol 50 mg half a tablet twice a day  Orders:  -     metoprolol tartrate (LOPRESSOR) 50 mg tablet; Take half a tablet of the 50 mg in the morning and half a tablet of 50 mg in the evening  5. Primary osteoarthritis involving multiple joints  Assessment & Plan:  Patient status post bilateral knee replacement  still has some discomfort feeling but much better.  6. Osteopenia of multiple sites  Assessment & Plan:  Patient is on calcium and vitamin D on a daily basis continue the same  7. Stage 3a chronic kidney disease (HCC)  Assessment & Plan:  Lab Results   Component Value Date    EGFR 60 05/06/2024    EGFR 66 12/08/2023    EGFR 58 11/09/2023    CREATININE 0.92 05/06/2024    CREATININE 0.90 12/08/2023    CREATININE 0.95 11/09/2023   0.9 to the last time with a GFR of 60 we will follow-up with repeat chemistry prior to the next visit.  8. Screening for thyroid disorder  -     TSH, 3rd generation with Free T4 reflex; Future; Expected date: 11/22/2024  9. Pre-diabetes  -     Comprehensive metabolic panel; Future  -     Hemoglobin A1C; Future; Expected date: 11/22/2024  -     UA w Reflex to Microscopic w Reflex to Culture; Future; Expected date: 11/22/2024  10. Dyslipidemia  -     Lipid panel; Future  11. Screening for deficiency anemia  -     CBC and differential; Future             Discussion Summary and Plan:  Today's care plan and medications were reviewed with patient in detail and all their questions answered to their satisfaction.    Chief Complaint   Patient presents with    Follow-up      Subjective:  Patient is coming in for a follow-up evaluation with regards to symptoms of overweight/obesity for which she has taken Wegovy for 3 months and since September she has lost about 11 pounds.    Patient has been not taking the omeprazole for past 1 week but she did not have any symptoms of GERD.  History of GERD symptoms for a long period of time was seen by the gastroenterologist who has recommended after looking at the pathology report to have a repeat endoscopy done within 1 years time.  According to the patient she did not get any phone call from the gastroenterologist office.  Will make a referral for the same she is also due for colonoscopy in April next year.  Meanwhile recommend patient to take the omeprazole  daily until we have the full evaluation done.  I reviewed the EGD report as well as the pathology report done in .        The following portions of the patient's history were reviewed and updated as appropriate: allergies, current medications, past family history, past medical history, past social history, past surgical history and problem list.    Review of Systems   Constitutional:  Negative for chills and fever.   HENT:  Negative for ear pain and sore throat.    Eyes:  Negative for pain and visual disturbance.   Respiratory:  Negative for cough and shortness of breath.    Cardiovascular:  Negative for chest pain and palpitations.   Gastrointestinal:  Negative for abdominal pain and vomiting.   Genitourinary:  Negative for dysuria and hematuria.   Musculoskeletal:  Negative for arthralgias and back pain.   Skin:  Negative for color change and rash.   Neurological:  Negative for seizures and syncope.   All other systems reviewed and are negative.        Historical Information   Patient Active Problem List   Diagnosis    HTN (hypertension)    Hypothyroidism    Gastroesophageal reflux disease    Osteoarthritis of multiple joints    Stage 3a chronic kidney disease (HCC)    Class 1 obesity due to excess calories without serious comorbidity in adult    Osteopenia of multiple sites    Palpitations     Past Medical History:   Diagnosis Date    Arthritis     left knee injection-2017    Disease of thyroid gland     hypothyroidism    DJD (degenerative joint disease)     GERD (gastroesophageal reflux disease)     Hypertension     Seasonal allergies     Wears glasses      Past Surgical History:   Procedure Laterality Date    BREAST BIOPSY Left 2014    BREAST CYST EXCISION Left     barely visible    BREAST SURGERY Left     x2 bx-benign     SECTION  1966    x1    COLONOSCOPY      age 67    COLONOSCOPY N/A 2017    Procedure: COLONOSCOPY;  Surgeon: Gonzalo Osorio MD;  Location: [facility];  Service:      ESOPHAGOGASTRODUODENOSCOPY N/A 03/06/2017    Procedure: ESOPHAGOGASTRODUODENOSCOPY (EGD);  Surgeon: Gonzalo Osorio MD;  Location: [facility];  Service:     ESOPHAGOGASTRODUODENOSCOPY N/A 11/20/2017    Procedure: ESOPHAGOGASTRODUODENOSCOPY (EGD);  Surgeon: Gonzalo Osorio MD;  Location: [facility];  Service: Gastroenterology    HERNIA REPAIR      incisional hernia right side    HYSTERECTOMY  1966    partial- ovaries remain    KNEE ARTHROSCOPY Left     torn meniscus    LIPOMA RESECTION      lower right abdomen    [place] EGD TRANSORAL BIOPSY SINGLE/MULTIPLE N/A 06/18/2018    Procedure: ESOPHAGOGASTRODUODENOSCOPY (EGD);  Surgeon: Gonzalo Osorio MD;  Location: [facility];  Service: Gastroenterology    ROTATOR CUFF REPAIR Left     SHOULDER ARTHROSCOPY W/ ROTATOR CUFF REPAIR Left     TRIGGER FINGER RELEASE Right     thumb     Social History     Substance and Sexual Activity   Alcohol Use No     Social History     Substance and Sexual Activity   Drug Use No     Social History     Tobacco Use   Smoking Status Never    Passive exposure: Never   Smokeless Tobacco Never     Family History   Problem Relation Age of Onset    Hepatitis Brother         hep C from [facility]     [employer] [facility] Due   Topic    Hepatitis C Screening     Zoster Vaccine (1 of 2)    Pneumococcal Vaccine: 65+ Years (1 of 1 - PCV)    RSV Vaccine Age 60+ Years (1 - 1-dose 75+ series)    Influenza Vaccine (1)    COVID-19 Vaccine (2 - 2024-25 season)      Meds/Allergies       Current Outpatient Medications:     calcium carbonate-vitamin D 500 mg-5 mcg per tablet, Take 1 tablet by mouth every morning, Disp: , Rfl:     levothyroxine 25 mcg tablet, Take 1 tablet (25 mcg total) by mouth every morning, Disp: 90 tablet, Rfl: 1    metoprolol tartrate (LOPRESSOR) 50 mg tablet, Take half a tablet of the 50 mg in the morning and half a tablet of 50 mg in the evening, Disp: 90 tablet, Rfl: 1    Multiple Vitamin (multivitamin) tablet, Take 1 tablet by mouth daily, Disp:  ", Rfl:     omeprazole (PriLOSEC) 20 mg delayed release capsule, One tablet by mouth 1/2 hour before breakfast daily, Disp: 90 capsule, Rfl: 1    topiramate (Topamax) 25 mg tablet, Take 1 tablet (25 mg total) by mouth 2 (two) times a day, Disp: 60 tablet, Rfl: 1      Objective:    Vitals:   /74 (BP Location: Right arm, Patient Position: Sitting, Cuff Size: Standard)   Pulse 71   Ht 5' 2\" (1.575 m)   Wt 76.7 kg (169 lb)   SpO2 97%   BMI 30.91 kg/m²   Body mass index is 30.91 kg/m².  Vitals:    11/22/24 0834   Weight: 76.7 kg (169 lb)       Physical Exam  Vitals and nursing note reviewed.   Constitutional:       Appearance: Normal appearance.   Cardiovascular:      Rate and Rhythm: Normal rate and regular rhythm.      Heart sounds: Normal heart sounds.   Pulmonary:      Effort: Pulmonary effort is normal.      Breath sounds: Normal breath sounds.   Abdominal:      Palpations: Abdomen is soft.   Musculoskeletal:         General: Normal range of motion.      Cervical back: Normal range of motion and neck supple.      Right lower leg: No edema.      Left lower leg: No edema.   Skin:     General: Skin is warm and dry.   Neurological:      General: No focal deficit present.      Mental Status: She is alert and oriented to person, place, and time.   Psychiatric:         Mood and Affect: Mood normal.         Behavior: Behavior normal.         Lab Review   No visits with results within 2 Month(s) from this visit.   Latest known visit with results is:   Lab on 08/02/2024   Component Date Value Ref Range Status    Color, UA 08/02/2024 Yellow   Final    Clarity, UA 08/02/2024 Clear   Final    Specific Gravity, UA 08/02/2024 1.020  1.005 - 1.030 Final    pH, UA 08/02/2024 5.5  4.5, 5.0, 5.5, 6.0, 6.5, 7.0, 7.5, 8.0 Final    Leukocytes, UA 08/02/2024 Large (A)  Negative Final    Nitrite, UA 08/02/2024 Negative  Negative Final    Protein, UA 08/02/2024 Negative  Negative mg/dl Final    Glucose, UA 08/02/2024 Negative  " "Negative mg/dl Final    Ketones, UA 08/02/2024 Negative  Negative mg/dl Final    Urobilinogen, UA 08/02/2024 <2.0  <2.0 mg/dl mg/dl Final    Bilirubin, UA 08/02/2024 Negative  Negative Final    Occult Blood, UA 08/02/2024 Trace (A)  Negative Final    RBC, UA 08/02/2024 0-1  None Seen, 0-1, 1-2, 2-4, 0-5 /hpf Final    WBC, UA 08/02/2024 10-20 (A)  None Seen, 0-1, 1-2, 0-5, 2-4 /hpf Final    Epithelial Cells 08/02/2024 Occasional  None Seen, Occasional /hpf Final    Bacteria, UA 08/02/2024 Innumerable (A)  None Seen, Occasional /hpf Final    WBC Clumps 08/02/2024 occ   Final    Urine Culture 08/02/2024 >100,000 cfu/ml Escherichia coli (A)   Final    This organism has been edited. The previous result was Gram Negative Hans Enteric Like on 8/3/2024 at 0926 EDT.         Aviva Delgado MD        \"This note has been constructed using a voice recognition system.Therefore there may be syntax, spelling, and/or grammatical errors. Please call if you have any questions. \"  "

## 2024-11-22 ENCOUNTER — OFFICE VISIT (OUTPATIENT)
Dept: FAMILY MEDICINE CLINIC | Facility: CLINIC | Age: 79
End: 2024-11-22
Payer: MEDICARE

## 2024-11-22 VITALS
HEART RATE: 71 BPM | SYSTOLIC BLOOD PRESSURE: 120 MMHG | DIASTOLIC BLOOD PRESSURE: 74 MMHG | WEIGHT: 169 LBS | OXYGEN SATURATION: 97 % | BODY MASS INDEX: 31.1 KG/M2 | HEIGHT: 62 IN

## 2024-11-22 DIAGNOSIS — E78.5 DYSLIPIDEMIA: ICD-10-CM

## 2024-11-22 DIAGNOSIS — I10 PRIMARY HYPERTENSION: ICD-10-CM

## 2024-11-22 DIAGNOSIS — M85.89 OSTEOPENIA OF MULTIPLE SITES: ICD-10-CM

## 2024-11-22 DIAGNOSIS — N18.31 STAGE 3A CHRONIC KIDNEY DISEASE (HCC): ICD-10-CM

## 2024-11-22 DIAGNOSIS — E66.811 CLASS 1 OBESITY DUE TO EXCESS CALORIES WITHOUT SERIOUS COMORBIDITY WITH BODY MASS INDEX (BMI) OF 30.0 TO 30.9 IN ADULT: Primary | ICD-10-CM

## 2024-11-22 DIAGNOSIS — E66.811 CLASS 1 OBESITY DUE TO EXCESS CALORIES WITHOUT SERIOUS COMORBIDITY WITH BODY MASS INDEX (BMI) OF 33.0 TO 33.9 IN ADULT: ICD-10-CM

## 2024-11-22 DIAGNOSIS — Z13.29 SCREENING FOR THYROID DISORDER: ICD-10-CM

## 2024-11-22 DIAGNOSIS — E03.9 ACQUIRED HYPOTHYROIDISM: ICD-10-CM

## 2024-11-22 DIAGNOSIS — M15.0 PRIMARY OSTEOARTHRITIS INVOLVING MULTIPLE JOINTS: ICD-10-CM

## 2024-11-22 DIAGNOSIS — Z13.0 SCREENING FOR DEFICIENCY ANEMIA: ICD-10-CM

## 2024-11-22 DIAGNOSIS — K21.00 GASTROESOPHAGEAL REFLUX DISEASE WITH ESOPHAGITIS WITHOUT HEMORRHAGE: ICD-10-CM

## 2024-11-22 DIAGNOSIS — R73.03 PRE-DIABETES: ICD-10-CM

## 2024-11-22 DIAGNOSIS — E66.09 CLASS 1 OBESITY DUE TO EXCESS CALORIES WITHOUT SERIOUS COMORBIDITY WITH BODY MASS INDEX (BMI) OF 33.0 TO 33.9 IN ADULT: ICD-10-CM

## 2024-11-22 DIAGNOSIS — E66.09 CLASS 1 OBESITY DUE TO EXCESS CALORIES WITHOUT SERIOUS COMORBIDITY WITH BODY MASS INDEX (BMI) OF 30.0 TO 30.9 IN ADULT: Primary | ICD-10-CM

## 2024-11-22 PROCEDURE — G2211 COMPLEX E/M VISIT ADD ON: HCPCS | Performed by: INTERNAL MEDICINE

## 2024-11-22 PROCEDURE — 99214 OFFICE O/P EST MOD 30 MIN: CPT | Performed by: INTERNAL MEDICINE

## 2024-11-22 RX ORDER — LEVOTHYROXINE SODIUM 25 UG/1
25 TABLET ORAL EVERY MORNING
Qty: 90 TABLET | Refills: 1 | Status: SHIPPED | OUTPATIENT
Start: 2024-11-22

## 2024-11-22 RX ORDER — TOPIRAMATE 25 MG/1
25 TABLET, FILM COATED ORAL 2 TIMES DAILY
Qty: 60 TABLET | Refills: 1 | Status: SHIPPED | OUTPATIENT
Start: 2024-11-22

## 2024-11-22 RX ORDER — METOPROLOL TARTRATE 50 MG
TABLET ORAL
Qty: 90 TABLET | Refills: 1 | Status: SHIPPED | OUTPATIENT
Start: 2024-11-22

## 2024-11-22 NOTE — ASSESSMENT & PLAN NOTE
Patient did not have any GERD symptoms after she stopped taking the omeprazole however there was a concern about pathology report of the upper endoscopy done in 2022 and a follow-up was recommended in 1 year however patient did not go for the repeat endoscopy in 1 years time send will reorder it discussed with patient to get it done and resume the omeprazole also until we have a endoscopy done.  Recommend patient not eat late in the night and keep the head of the bed up if his symptoms recur.

## 2024-11-22 NOTE — ASSESSMENT & PLAN NOTE
Patient with hypothyroidism currently taking 25 mcg of levothyroxine we will continue the same follow-up with repeat TSH level in few months

## 2024-11-22 NOTE — ASSESSMENT & PLAN NOTE
patient has lost about 11 pounds in 3 months time after being on Wegovy initially 0.25 mg subsequently 0.5 mg.  Recommend patient to continue with diet exercise weight loss management cutting back on carbohydrate intake.

## 2024-11-22 NOTE — ASSESSMENT & PLAN NOTE
Lab Results   Component Value Date    EGFR 60 05/06/2024    EGFR 66 12/08/2023    EGFR 58 11/09/2023    CREATININE 0.92 05/06/2024    CREATININE 0.90 12/08/2023    CREATININE 0.95 11/09/2023   0.9 to the last time with a GFR of 60 we will follow-up with repeat chemistry prior to the next visit.

## 2024-12-02 ENCOUNTER — TELEPHONE (OUTPATIENT)
Age: 79
End: 2024-12-02

## 2024-12-03 ENCOUNTER — RESULTS FOLLOW-UP (OUTPATIENT)
Dept: FAMILY MEDICINE CLINIC | Facility: CLINIC | Age: 79
End: 2024-12-03

## 2024-12-03 ENCOUNTER — APPOINTMENT (OUTPATIENT)
Dept: LAB | Facility: HOSPITAL | Age: 79
End: 2024-12-03
Attending: INTERNAL MEDICINE
Payer: MEDICARE

## 2024-12-03 DIAGNOSIS — E78.5 DYSLIPIDEMIA: ICD-10-CM

## 2024-12-03 DIAGNOSIS — Z13.0 SCREENING FOR DEFICIENCY ANEMIA: ICD-10-CM

## 2024-12-03 DIAGNOSIS — Z13.29 SCREENING FOR THYROID DISORDER: ICD-10-CM

## 2024-12-03 DIAGNOSIS — R73.03 PRE-DIABETES: ICD-10-CM

## 2024-12-03 LAB
ALBUMIN SERPL BCG-MCNC: 3.8 G/DL (ref 3.5–5)
ALP SERPL-CCNC: 53 U/L (ref 34–104)
ALT SERPL W P-5'-P-CCNC: 13 U/L (ref 7–52)
ANION GAP SERPL CALCULATED.3IONS-SCNC: 5 MMOL/L (ref 4–13)
AST SERPL W P-5'-P-CCNC: 14 U/L (ref 13–39)
BACTERIA UR QL AUTO: ABNORMAL /HPF
BASOPHILS # BLD AUTO: 0.04 THOUSANDS/ΜL (ref 0–0.1)
BASOPHILS NFR BLD AUTO: 1 % (ref 0–1)
BILIRUB SERPL-MCNC: 0.6 MG/DL (ref 0.2–1)
BILIRUB UR QL STRIP: NEGATIVE
BUN SERPL-MCNC: 19 MG/DL (ref 5–25)
CALCIUM SERPL-MCNC: 9.1 MG/DL (ref 8.4–10.2)
CHLORIDE SERPL-SCNC: 107 MMOL/L (ref 96–108)
CHOLEST SERPL-MCNC: 171 MG/DL (ref ?–200)
CLARITY UR: CLEAR
CO2 SERPL-SCNC: 28 MMOL/L (ref 21–32)
COLOR UR: YELLOW
CREAT SERPL-MCNC: 0.92 MG/DL (ref 0.6–1.3)
EOSINOPHIL # BLD AUTO: 0.1 THOUSAND/ΜL (ref 0–0.61)
EOSINOPHIL NFR BLD AUTO: 2 % (ref 0–6)
ERYTHROCYTE [DISTWIDTH] IN BLOOD BY AUTOMATED COUNT: 13.2 % (ref 11.6–15.1)
EST. AVERAGE GLUCOSE BLD GHB EST-MCNC: 114 MG/DL
GFR SERPL CREATININE-BSD FRML MDRD: 60 ML/MIN/1.73SQ M
GLUCOSE P FAST SERPL-MCNC: 87 MG/DL (ref 65–99)
GLUCOSE UR STRIP-MCNC: NEGATIVE MG/DL
HBA1C MFR BLD: 5.6 %
HCT VFR BLD AUTO: 45.7 % (ref 34.8–46.1)
HDLC SERPL-MCNC: 33 MG/DL
HGB BLD-MCNC: 15.2 G/DL (ref 11.5–15.4)
HGB UR QL STRIP.AUTO: ABNORMAL
IMM GRANULOCYTES # BLD AUTO: 0.02 THOUSAND/UL (ref 0–0.2)
IMM GRANULOCYTES NFR BLD AUTO: 0 % (ref 0–2)
KETONES UR STRIP-MCNC: NEGATIVE MG/DL
LDLC SERPL CALC-MCNC: 106 MG/DL (ref 0–100)
LEUKOCYTE ESTERASE UR QL STRIP: ABNORMAL
LYMPHOCYTES # BLD AUTO: 1.98 THOUSANDS/ΜL (ref 0.6–4.47)
LYMPHOCYTES NFR BLD AUTO: 36 % (ref 14–44)
MCH RBC QN AUTO: 31.3 PG (ref 26.8–34.3)
MCHC RBC AUTO-ENTMCNC: 33.3 G/DL (ref 31.4–37.4)
MCV RBC AUTO: 94 FL (ref 82–98)
MONOCYTES # BLD AUTO: 0.56 THOUSAND/ΜL (ref 0.17–1.22)
MONOCYTES NFR BLD AUTO: 10 % (ref 4–12)
NEUTROPHILS # BLD AUTO: 2.78 THOUSANDS/ΜL (ref 1.85–7.62)
NEUTS SEG NFR BLD AUTO: 51 % (ref 43–75)
NITRITE UR QL STRIP: NEGATIVE
NON-SQ EPI CELLS URNS QL MICRO: ABNORMAL /HPF
NONHDLC SERPL-MCNC: 138 MG/DL
NRBC BLD AUTO-RTO: 0 /100 WBCS
PH UR STRIP.AUTO: 5.5 [PH]
PLATELET # BLD AUTO: 287 THOUSANDS/UL (ref 149–390)
PMV BLD AUTO: 9.4 FL (ref 8.9–12.7)
POTASSIUM SERPL-SCNC: 4.1 MMOL/L (ref 3.5–5.3)
PROT SERPL-MCNC: 6.3 G/DL (ref 6.4–8.4)
PROT UR STRIP-MCNC: NEGATIVE MG/DL
RBC # BLD AUTO: 4.85 MILLION/UL (ref 3.81–5.12)
RBC #/AREA URNS AUTO: ABNORMAL /HPF
SODIUM SERPL-SCNC: 140 MMOL/L (ref 135–147)
SP GR UR STRIP.AUTO: 1.02 (ref 1–1.03)
TRIGL SERPL-MCNC: 160 MG/DL (ref ?–150)
TSH SERPL DL<=0.05 MIU/L-ACNC: 3.2 UIU/ML (ref 0.45–4.5)
UROBILINOGEN UR STRIP-ACNC: <2 MG/DL
WBC # BLD AUTO: 5.48 THOUSAND/UL (ref 4.31–10.16)
WBC #/AREA URNS AUTO: ABNORMAL /HPF

## 2024-12-03 PROCEDURE — 80061 LIPID PANEL: CPT

## 2024-12-03 PROCEDURE — 81001 URINALYSIS AUTO W/SCOPE: CPT

## 2024-12-03 PROCEDURE — 80053 COMPREHEN METABOLIC PANEL: CPT

## 2024-12-03 PROCEDURE — 36415 COLL VENOUS BLD VENIPUNCTURE: CPT

## 2024-12-03 PROCEDURE — 84443 ASSAY THYROID STIM HORMONE: CPT

## 2024-12-03 PROCEDURE — 85025 COMPLETE CBC W/AUTO DIFF WBC: CPT

## 2024-12-03 PROCEDURE — 83036 HEMOGLOBIN GLYCOSYLATED A1C: CPT

## 2025-02-25 ENCOUNTER — TELEPHONE (OUTPATIENT)
Age: 80
End: 2025-02-25

## 2025-02-25 ENCOUNTER — CONSULT (OUTPATIENT)
Age: 80
End: 2025-02-25
Payer: MEDICARE

## 2025-02-25 VITALS
HEIGHT: 62 IN | HEART RATE: 64 BPM | WEIGHT: 176 LBS | SYSTOLIC BLOOD PRESSURE: 149 MMHG | DIASTOLIC BLOOD PRESSURE: 88 MMHG | BODY MASS INDEX: 32.39 KG/M2

## 2025-02-25 DIAGNOSIS — Z86.0101 HX OF ADENOMATOUS POLYP OF COLON: ICD-10-CM

## 2025-02-25 DIAGNOSIS — K22.70 BARRETT'S ESOPHAGUS WITHOUT DYSPLASIA: Primary | ICD-10-CM

## 2025-02-25 DIAGNOSIS — K21.00 GASTROESOPHAGEAL REFLUX DISEASE WITH ESOPHAGITIS WITHOUT HEMORRHAGE: ICD-10-CM

## 2025-02-25 PROCEDURE — 99204 OFFICE O/P NEW MOD 45 MIN: CPT | Performed by: NURSE PRACTITIONER

## 2025-02-25 RX ORDER — CALCIUM CARBONATE/VITAMIN D3 500 MG-2.5
TABLET,CHEWABLE ORAL
COMMUNITY

## 2025-02-25 RX ORDER — SODIUM CHLORIDE, SODIUM LACTATE, POTASSIUM CHLORIDE, CALCIUM CHLORIDE 600; 310; 30; 20 MG/100ML; MG/100ML; MG/100ML; MG/100ML
125 INJECTION, SOLUTION INTRAVENOUS CONTINUOUS
OUTPATIENT
Start: 2025-02-25

## 2025-02-25 NOTE — ASSESSMENT & PLAN NOTE
Patient reports that she was recently on Wegovy for 3 months.  During that time she experienced increased acid reflux that required her to take Tums daily on top of her omeprazole 20 mg daily.  Patient has stopped Wegovy and symptoms have improved.   Orders:    Ambulatory referral to Gastroenterology    EGD; Future  Continue omeprazole  Continue to follow antireflux diet and measures  May take Tums as needed for breakthrough symptoms

## 2025-02-25 NOTE — ASSESSMENT & PLAN NOTE
Patient has history of Moncada's esophagus.  EGD done 4/13/2022 biopsy positive for Moncada's esophagus.  Biopsy during EGD done 7/25/2022 showed some observers regard multilayered epithelium as a precursor to Moncada's mucosa.  Orders:    EGD; Future  Continue omeprazole  Continue to follow antireflux diet and measures

## 2025-02-25 NOTE — ASSESSMENT & PLAN NOTE
Patient has history of adenomatous colon polyps. Colonoscopy done 9/13/2022 showed 4 polyps removed.  Sigmoid diverticulosis and redundant colon requiring pressure to reach cecum.  Recommendations were repeat colonoscopy in 3 years.  Biopsy showed hyperplastic polyp x 2 and fragments of adenomatous polyp.  No evidence of high-grade dysplasia or malignancy Patient is due for surveillance colonoscopy.  Orders:    Colonoscopy; Future

## 2025-02-25 NOTE — PROGRESS NOTES
Name: Agnieszka Camejo      : 1945      MRN: [id number]  Encounter Provider: AURORA Conn  Encounter Date: 2025   Encounter department: [facility]  :  Assessment & Plan  Gastroesophageal reflux disease with esophagitis without hemorrhage  Patient reports that she was recently on Wegovy for 3 months.  During that time she experienced increased acid reflux that required her to take Tums daily on top of her omeprazole 20 mg daily.  Patient has stopped Wegovy and symptoms have improved.   Orders:    Ambulatory referral to Gastroenterology    EGD; Future  Continue omeprazole  Continue to follow antireflux diet and measures  May take Tums as needed for breakthrough symptoms  Moncada's esophagus without dysplasia  Patient has history of Moncada's esophagus.  EGD done 2022 biopsy positive for Moncada's esophagus.  Biopsy during EGD done 2022 showed some observers regard multilayered epithelium as a precursor to Moncada's mucosa.  Orders:    EGD; Future  Continue omeprazole  Continue to follow antireflux diet and measures  Hx of adenomatous polyp of colon  Patient has history of adenomatous colon polyps. Colonoscopy done 2022 showed 4 polyps removed.  Sigmoid diverticulosis and redundant colon requiring pressure to reach cecum.  Recommendations were repeat colonoscopy in 3 years.  Biopsy showed hyperplastic polyp x 2 and fragments of adenomatous polyp.  No evidence of high-grade dysplasia or malignancy Patient is due for surveillance colonoscopy.  Orders:    Colonoscopy; Future    Follow-up as needed    History of Present Illness   HPI  Agnieszka Camejo is a 79 y.o. female with past medical history of HTN, bilateral knee replacements, osteoarthritis, osteopenia, Moncada's esophagus, GERD, history of adenomatous polyps, and stage III CKD who presents to office as consult.  Patient is due for EGD and colonoscopy.  Patient reports that she was recently on  Wegovy for 3 months.  During that time she experienced increased acid reflux that required her to take Tums daily on top of her omeprazole 20 mg daily.  Patient has stopped Wegovy and symptoms have improved.  Patient denies nausea, vomiting, heartburn, dysphagia, epigastric or abdominal pain.  Patient denies blood in stool, blood from rectal area, or black tarry stool.  Abdomen exam benign no abdominal tenderness or guarding.    Colonoscopy done 9/13/2022 showed 4 polyps removed.  Sigmoid diverticulosis and redundant colon requiring pressure to reach cecum.  Recommendations were repeat colonoscopy in 3 years.  Biopsy showed hyperplastic polyp x 2 and fragments of adenomatous polyp.  No evidence of high-grade dysplasia or malignancy.  EGD done 4/13/2022 showed hiatal hernia.  2 polyps removed.  Active esophagitis with history of pre-Moncada's.  Biopsy showed goblet cell metaplasia consistent with Moncada's esophagus.  Mild chronic active gastritis.  EGD done 7/25/2022 showed 6 stomach polyp.  3 cm hiatal hernia.  Intestinal metaplasia lower esophagus.  Biopsy showed some observers regard multilayered epithelium as a precursor to Moncada's mucosa. Negative for dysplasia.  Polyp fundic gland polyp.  Distal esophagus reactive changes.  No intestinal metaplasia.          Review of Systems   Constitutional:  Negative for chills and fever.   HENT:  Negative for ear pain and sore throat.    Eyes:  Negative for pain and visual disturbance.   Respiratory:  Negative for cough and shortness of breath.    Cardiovascular:  Negative for chest pain and palpitations.   Gastrointestinal:  Negative for abdominal pain and vomiting.        Positive acid reflux   Genitourinary:  Negative for dysuria and hematuria.   Musculoskeletal:  Negative for arthralgias and back pain.   Skin:  Negative for color change and rash.   Neurological:  Negative for seizures and syncope.   All other systems reviewed and are negative.    Medical History  Reviewed by provider this encounter:  Tobacco  Allergies  Meds  Problems  Med Hx  Surg Hx  Fam Hx     .  Past Medical History   Past Medical History:   Diagnosis Date    Arthritis     left knee injection-2017    Colon polyp     Disease of thyroid gland     hypothyroidism    DJD (degenerative joint disease)     GERD (gastroesophageal reflux disease)     Hypertension     Seasonal allergies     Wears glasses      Past Surgical History:   Procedure Laterality Date    BREAST BIOPSY Left 2014    BREAST CYST EXCISION Left     barely visible    BREAST SURGERY Left     x2 bx-benign     SECTION  1966    x1    COLONOSCOPY      age 67    COLONOSCOPY N/A 2017    Procedure: COLONOSCOPY;  Surgeon: Gonzalo Osorio MD;  Location: [facility];  Service:     ESOPHAGOGASTRODUODENOSCOPY N/A 2017    Procedure: ESOPHAGOGASTRODUODENOSCOPY (EGD);  Surgeon: Gonzalo Osorio MD;  Location: [facility];  Service:     ESOPHAGOGASTRODUODENOSCOPY N/A 2017    Procedure: ESOPHAGOGASTRODUODENOSCOPY (EGD);  Surgeon: Gonzalo Osorio MD;  Location: [facility];  Service: Gastroenterology    HERNIA REPAIR      incisional hernia right side    HYSTERECTOMY  1966    partial- ovaries remain    KNEE ARTHROSCOPY Left     torn meniscus    LIPOMA RESECTION      lower right abdomen    ND EGD TRANSORAL BIOPSY SINGLE/MULTIPLE N/A 2018    Procedure: ESOPHAGOGASTRODUODENOSCOPY (EGD);  Surgeon: Gonzalo Osorio MD;  Location: [facility];  Service: Gastroenterology    ROTATOR CUFF REPAIR Left     SHOULDER ARTHROSCOPY W/ ROTATOR CUFF REPAIR Left     TRIGGER FINGER RELEASE Right     thumb    UPPER GASTROINTESTINAL ENDOSCOPY       Family History   Problem Relation Age of Onset    Hepatitis Brother         hep C from [place]      reports that she has never smoked. She has never been exposed to tobacco smoke. She has never used smokeless tobacco. She reports that she does not drink alcohol and does not use drugs.  Current Outpatient  "Medications   Medication Instructions    Calcium 500-2.5 MG-MCG CHEW Chew    calcium carbonate-vitamin D 500 mg-5 mcg per tablet 1 tablet, Every morning    levothyroxine 25 mcg, Oral, Every morning    metoprolol tartrate (LOPRESSOR) 50 mg tablet Take half a tablet of the 50 mg in the morning and half a tablet of 50 mg in the evening    Multiple Vitamin (multivitamin) tablet 1 tablet, Daily    omeprazole (PriLOSEC) 20 mg delayed release capsule One tablet by mouth 1/2 hour before breakfast daily    topiramate (TOPAMAX) 25 mg, Oral, 2 times daily   No Known Allergies   Current Outpatient Medications on File Prior to Visit   Medication Sig Dispense Refill    Calcium 500-2.5 MG-MCG CHEW Chew      levothyroxine 25 mcg tablet Take 1 tablet (25 mcg total) by mouth every morning 90 tablet 1    metoprolol tartrate (LOPRESSOR) 50 mg tablet Take half a tablet of the 50 mg in the morning and half a tablet of 50 mg in the evening 90 tablet 1    Multiple Vitamin (multivitamin) tablet Take 1 tablet by mouth daily      omeprazole (PriLOSEC) 20 mg delayed release capsule One tablet by mouth 1/2 hour before breakfast daily 90 capsule 1    topiramate (Topamax) 25 mg tablet Take 1 tablet (25 mg total) by mouth 2 (two) times a day 60 tablet 1    calcium carbonate-vitamin D 500 mg-5 mcg per tablet Take 1 tablet by mouth every morning (Patient not taking: Reported on 2/25/2025)       No current facility-administered medications on file prior to visit.      Social History     Tobacco Use    Smoking status: Never     Passive exposure: Never    Smokeless tobacco: Never   Vaping Use    Vaping status: Never Used   Substance and Sexual Activity    Alcohol use: No    Drug use: No    Sexual activity: Not on file        Objective   /88 (Patient Position: Sitting, Cuff Size: Standard)   Pulse 64   Ht 5' 2\" (1.575 m)   Wt 79.8 kg (176 lb)   BMI 32.19 kg/m²      Physical Exam  Vitals and nursing note reviewed.   Constitutional:       " General: She is not in acute distress.     Appearance: She is well-developed.   HENT:      Head: Normocephalic and atraumatic.   Eyes:      Conjunctiva/sclera: Conjunctivae normal.   Cardiovascular:      Rate and Rhythm: Normal rate and regular rhythm.      Pulses: Normal pulses.      Heart sounds: Normal heart sounds. No murmur heard.  Pulmonary:      Effort: Pulmonary effort is normal. No respiratory distress.      Breath sounds: Normal breath sounds. No stridor. No wheezing, rhonchi or rales.   Abdominal:      General: Bowel sounds are normal. There is no distension.      Palpations: Abdomen is soft. There is no mass.      Tenderness: There is no abdominal tenderness. There is no guarding or rebound.   Musculoskeletal:         General: No swelling.      Cervical back: Neck supple.      Right lower leg: No edema.      Left lower leg: No edema.   Skin:     General: Skin is warm and dry.      Capillary Refill: Capillary refill takes less than 2 seconds.      Coloration: Skin is not jaundiced or pale.   Neurological:      Mental Status: She is alert and oriented to person, place, and time.   Psychiatric:         Mood and Affect: Mood normal.

## 2025-02-25 NOTE — TELEPHONE ENCOUNTER
Scheduled date of EGD/colonoscopy (as of today):April 10, 2025  Physician performing EGD/colonoscopy:Dr. Arizmendi  Location of EGD/colonoscopy:[facility]  Desired bowel prep reviewed with patient:Miralax/Dulcolax  Instructions reviewed with patient by:BYRON  Clearances:  NA      Due to pts transportation, Please try to keep the early time for the pt.  Pt says the earlier the better.

## 2025-03-24 DIAGNOSIS — K21.00 GASTROESOPHAGEAL REFLUX DISEASE WITH ESOPHAGITIS WITHOUT HEMORRHAGE: ICD-10-CM

## 2025-03-24 DIAGNOSIS — I10 PRIMARY HYPERTENSION: ICD-10-CM

## 2025-03-24 DIAGNOSIS — E03.9 ACQUIRED HYPOTHYROIDISM: ICD-10-CM

## 2025-03-24 NOTE — TELEPHONE ENCOUNTER
Reason for call:   [x] Refill   [] Prior Auth  [] Other:     Office:   [x] PCP/Provider - Danny  [] Specialty/Provider -     Levothyroxine 25mcg  1 tab daily #90    Metoprolol tart 50mg  1/2 tab bid #90    Omperazole 20mg  1 cap daily #90    Pharmacy: [facility] [address] [place] [address] [address] [phone]     Local Pharmacy   Does the patient have enough for 3 days?   [x] Yes   [] No - Send as HP to POD

## 2025-03-25 RX ORDER — LEVOTHYROXINE SODIUM 25 UG/1
25 TABLET ORAL EVERY MORNING
Qty: 90 TABLET | Refills: 1 | Status: SHIPPED | OUTPATIENT
Start: 2025-03-25

## 2025-03-25 RX ORDER — METOPROLOL TARTRATE 50 MG
TABLET ORAL
Qty: 90 TABLET | Refills: 1 | Status: SHIPPED | OUTPATIENT
Start: 2025-03-25

## 2025-03-25 RX ORDER — OMEPRAZOLE 20 MG/1
CAPSULE, DELAYED RELEASE ORAL
Qty: 90 CAPSULE | Refills: 1 | Status: SHIPPED | OUTPATIENT
Start: 2025-03-25

## 2025-03-26 ENCOUNTER — ANESTHESIA (OUTPATIENT)
Dept: ANESTHESIOLOGY | Facility: HOSPITAL | Age: 80
End: 2025-03-26

## 2025-03-26 ENCOUNTER — ANESTHESIA EVENT (OUTPATIENT)
Dept: ANESTHESIOLOGY | Facility: HOSPITAL | Age: 80
End: 2025-03-26

## 2025-04-03 ENCOUNTER — TELEPHONE (OUTPATIENT)
Dept: GASTROENTEROLOGY | Facility: CLINIC | Age: 80
End: 2025-04-03

## 2025-04-03 NOTE — TELEPHONE ENCOUNTER
I lmom informing pt can start taking MiraLAX 1 capful daily a week before procedure to ensure adequate cleansing and to take the second dose of Miralax at 8pm to ensure adequate cleansing. I asked pt to please call back if has any questions.

## 2025-04-03 NOTE — TELEPHONE ENCOUNTER
Patients GI provider:  Dr. Arizmendi    Number to return call: [phone]    Reason for call: Pt returning call to confirm appt.    She also has a querstion about her prep, she says she will NOT be starting her prep in the evening because she can't stay up all night with BM's.  She would like to start her Prep around 10 or 11am so that she will be done at a reasonable time and can sleep. She says she can add an extra dose of the Miralax if the Dr wants her too, to make sure she's prepped. I advised I am not permitted to change any of the prep instructions but that I would reach out to the Dr's team to have them advise. Please give Pt a call when possible and advise on the best timeline.     Scheduled procedure/appointment date if applicable: 04.10.25

## 2025-04-03 NOTE — TELEPHONE ENCOUNTER
lmom confirming pt's colonoscopy/egd scheduled on 4/10/25 at [facility] with Dr Arizmendi.  Informed [facility] would be calling the day prior with the arrival time.  Informed of clear liquid diet day prior as well as the bowel cleansing preparation.  Informed would need a  the day of the procedure due to being under sedation. I asked pt to please call back to confirm.

## 2025-04-03 NOTE — TELEPHONE ENCOUNTER
Pt calling in, I explained the information to pt, she was hoping she could complete the prep earlier than that to be able to sleep . I informed her that we want make sure she has adequate clean out. No further questions.

## 2025-04-03 NOTE — TELEPHONE ENCOUNTER
MD Rohan AppiahJu now (9:58 AM)       She can start taking MiraLAX 1 capful daily a week before procedure to ensure adequate cleansing.   Also she can take her second dose of MiraLAX at around 8 PM to ensure adequate cleansing at the last moment.

## 2025-04-10 ENCOUNTER — ANESTHESIA EVENT (OUTPATIENT)
Dept: GASTROENTEROLOGY | Facility: AMBULARY SURGERY CENTER | Age: 80
End: 2025-04-10
Payer: MEDICARE

## 2025-04-10 ENCOUNTER — HOSPITAL ENCOUNTER (OUTPATIENT)
Dept: GASTROENTEROLOGY | Facility: AMBULARY SURGERY CENTER | Age: 80
Setting detail: OUTPATIENT SURGERY
End: 2025-04-10
Attending: INTERNAL MEDICINE
Payer: MEDICARE

## 2025-04-10 VITALS
SYSTOLIC BLOOD PRESSURE: 123 MMHG | OXYGEN SATURATION: 96 % | HEART RATE: 62 BPM | TEMPERATURE: 98.8 F | DIASTOLIC BLOOD PRESSURE: 62 MMHG | RESPIRATION RATE: 16 BRPM

## 2025-04-10 DIAGNOSIS — K21.00 GASTROESOPHAGEAL REFLUX DISEASE WITH ESOPHAGITIS WITHOUT HEMORRHAGE: ICD-10-CM

## 2025-04-10 DIAGNOSIS — K22.70 BARRETT'S ESOPHAGUS WITHOUT DYSPLASIA: ICD-10-CM

## 2025-04-10 DIAGNOSIS — Z86.0101 HX OF ADENOMATOUS POLYP OF COLON: ICD-10-CM

## 2025-04-10 PROCEDURE — 88305 TISSUE EXAM BY PATHOLOGIST: CPT | Performed by: PATHOLOGY

## 2025-04-10 PROCEDURE — 43251 EGD REMOVE LESION SNARE: CPT | Performed by: INTERNAL MEDICINE

## 2025-04-10 PROCEDURE — 45385 COLONOSCOPY W/LESION REMOVAL: CPT | Performed by: INTERNAL MEDICINE

## 2025-04-10 PROCEDURE — 43239 EGD BIOPSY SINGLE/MULTIPLE: CPT | Performed by: INTERNAL MEDICINE

## 2025-04-10 RX ORDER — PROPOFOL 10 MG/ML
INJECTION, EMULSION INTRAVENOUS AS NEEDED
Status: DISCONTINUED | OUTPATIENT
Start: 2025-04-10 | End: 2025-04-10

## 2025-04-10 RX ORDER — PROPOFOL 10 MG/ML
INJECTION, EMULSION INTRAVENOUS CONTINUOUS PRN
Status: DISCONTINUED | OUTPATIENT
Start: 2025-04-10 | End: 2025-04-10

## 2025-04-10 RX ORDER — LIDOCAINE HYDROCHLORIDE 20 MG/ML
INJECTION, SOLUTION EPIDURAL; INFILTRATION; INTRACAUDAL; PERINEURAL AS NEEDED
Status: DISCONTINUED | OUTPATIENT
Start: 2025-04-10 | End: 2025-04-10

## 2025-04-10 RX ORDER — SODIUM CHLORIDE, SODIUM LACTATE, POTASSIUM CHLORIDE, CALCIUM CHLORIDE 600; 310; 30; 20 MG/100ML; MG/100ML; MG/100ML; MG/100ML
125 INJECTION, SOLUTION INTRAVENOUS CONTINUOUS
Status: DISCONTINUED | OUTPATIENT
Start: 2025-04-10 | End: 2025-04-14 | Stop reason: HOSPADM

## 2025-04-10 RX ADMIN — PROPOFOL 50 MG: 10 INJECTION, EMULSION INTRAVENOUS at 08:13

## 2025-04-10 RX ADMIN — LIDOCAINE HYDROCHLORIDE 100 MG: 20 INJECTION, SOLUTION EPIDURAL; INFILTRATION; INTRACAUDAL; PERINEURAL at 08:07

## 2025-04-10 RX ADMIN — SODIUM CHLORIDE, SODIUM LACTATE, POTASSIUM CHLORIDE, AND CALCIUM CHLORIDE: .6; .31; .03; .02 INJECTION, SOLUTION INTRAVENOUS at 07:42

## 2025-04-10 RX ADMIN — PROPOFOL 130 MG: 10 INJECTION, EMULSION INTRAVENOUS at 08:07

## 2025-04-10 RX ADMIN — PROPOFOL 120 MCG/KG/MIN: 10 INJECTION, EMULSION INTRAVENOUS at 08:12

## 2025-04-10 NOTE — ANESTHESIA PREPROCEDURE EVALUATION
Procedure:  COLONOSCOPY  EGD    Relevant Problems   ANESTHESIA (within normal limits)      CARDIO   (+) HTN (hypertension)      ENDO   (+) Hypothyroidism      GI/HEPATIC   (+) Gastroesophageal reflux disease      /RENAL   (+) Stage 3a chronic kidney disease (HCC)      MUSCULOSKELETAL   (+) Osteoarthritis of multiple joints      PULMONARY (within normal limits)        Physical Exam    Airway    Mallampati score: II  TM Distance: >3 FB  Neck ROM: full     Dental   No notable dental hx     Cardiovascular  Cardiovascular exam normal    Pulmonary  Pulmonary exam normal     Other Findings  post-pubertal.      Anesthesia Plan  ASA Score- 2     Anesthesia Type- IV sedation with anesthesia with ASA Monitors.         Additional Monitors:     Airway Plan:            Plan Factors-Exercise tolerance (METS): >4 METS.    Chart reviewed. EKG reviewed.  Existing labs reviewed. Patient summary reviewed.    Patient is not a current smoker.              Induction-     Postoperative Plan-         Informed Consent- Anesthetic plan and risks discussed with patient.  I personally reviewed this patient with the CRNA. Discussed and agreed on the Anesthesia Plan with the CRNA..

## 2025-04-10 NOTE — H&P
History and Physical - [facility]  Agnieszka Camejo 80 y.o. female MRN: [id number]                  HPI: Agnieszka Camejo is a 80 y.o. 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    Historical Information   Past Medical History:   Diagnosis Date    Arthritis     left knee injection-2017    Colon polyp     Disease of thyroid gland     hypothyroidism    DJD (degenerative joint disease)     GERD (gastroesophageal reflux disease)     Hypertension     Seasonal allergies     Wears glasses      Past Surgical History:   Procedure Laterality Date    BREAST BIOPSY Left 2014    BREAST CYST EXCISION Left     barely visible    BREAST SURGERY Left     x2 bx-benign     SECTION  1966    x1    COLONOSCOPY      age 67    COLONOSCOPY N/A 2017    Procedure: COLONOSCOPY;  Surgeon: Gonzalo Osorio MD;  Location: [facility] GI LAB;  Service:     ESOPHAGOGASTRODUODENOSCOPY N/A 2017    Procedure: ESOPHAGOGASTRODUODENOSCOPY (EGD);  Surgeon: Gonzalo Osorio MD;  Location: [facility] GI LAB;  Service:     ESOPHAGOGASTRODUODENOSCOPY N/A 2017    Procedure: ESOPHAGOGASTRODUODENOSCOPY (EGD);  Surgeon: Gonzalo Osorio MD;  Location: [facility] GI LAB;  Service: Gastroenterology    HERNIA REPAIR      incisional hernia right side    HYSTERECTOMY  1966    partial- ovaries remain    KNEE ARTHROSCOPY Left     torn meniscus    LIPOMA RESECTION      lower right abdomen    NH EGD TRANSORAL BIOPSY SINGLE/MULTIPLE N/A 2018    Procedure: ESOPHAGOGASTRODUODENOSCOPY (EGD);  Surgeon: Gonzalo Osorio MD;  Location: [facility] GI LAB;  Service: Gastroenterology    ROTATOR CUFF REPAIR Left     SHOULDER ARTHROSCOPY W/ ROTATOR CUFF REPAIR Left     TRIGGER FINGER RELEASE Right     thumb    UPPER GASTROINTESTINAL ENDOSCOPY       Social History   Social History     Substance and Sexual Activity   Alcohol Use No     Social History     Substance and  Sexual Activity   Drug Use No     Social History     Tobacco Use   Smoking Status Never    Passive exposure: Never   Smokeless Tobacco Never     Family History   Problem Relation Age of Onset    Hepatitis Brother         hep C from [place]       Meds/Allergies       Current Outpatient Medications:     Calcium 500-2.5 MG-MCG CHEW    calcium carbonate-vitamin D 500 mg-5 mcg per tablet    levothyroxine 25 mcg tablet    metoprolol tartrate (LOPRESSOR) 50 mg tablet    Multiple Vitamin (multivitamin) tablet    omeprazole (PriLOSEC) 20 mg delayed release capsule    topiramate (Topamax) 25 mg tablet    Current Facility-Administered Medications:     lactated ringers infusion, 125 mL/hr, Intravenous, Continuous    lactated ringers infusion, 125 mL/hr, Intravenous, Continuous    No Known Allergies    Objective     /60   Pulse 67   Temp 98.8 °F (37.1 °C) (Temporal)   Resp 16   SpO2 95%       PHYSICAL EXAM    Gen: NAD  Head: NCAT  CV: RRR  CHEST: Clear  ABD: soft, NT/ND  EXT: no edema      ASSESSMENT/PLAN:  This is a 80 y.o. year old female here for EGD colonoscopy, and she is stable and optimized for her procedure.

## 2025-04-10 NOTE — ANESTHESIA POSTPROCEDURE EVALUATION
Post-Op Assessment Note    CV Status:  Stable  Pain Score: 0    Pain management: adequate       Mental Status:  Arousable and sleepy   PONV Controlled:  Controlled   Airway Patency:  Patent     Post Op Vitals Reviewed: Yes    No anethesia notable event occurred.    Staff: Anesthesiologist, CRNA           Last Filed PACU Vitals:  Vitals Value Taken Time   Temp     Pulse     BP     Resp     SpO2

## 2025-04-14 PROCEDURE — 88305 TISSUE EXAM BY PATHOLOGIST: CPT | Performed by: PATHOLOGY

## 2025-04-24 ENCOUNTER — RA CDI HCC (OUTPATIENT)
Dept: OTHER | Facility: HOSPITAL | Age: 80
End: 2025-04-24

## 2025-04-28 ENCOUNTER — RESULTS FOLLOW-UP (OUTPATIENT)
Dept: GASTROENTEROLOGY | Facility: CLINIC | Age: 80
End: 2025-04-28

## 2025-04-28 NOTE — RESULT ENCOUNTER NOTE
Colon biopsy unremarkable, no further screening colonoscopy is recommended at this time unless patient has any GI symptoms.  EGD biopsy may be suggestive of Moncada's mucosa, follow antireflux measures.  Repeat EGD in 2 years for reexamination and biopsies if her overall health is stable.

## 2025-04-30 ENCOUNTER — OFFICE VISIT (OUTPATIENT)
Dept: FAMILY MEDICINE CLINIC | Facility: CLINIC | Age: 80
End: 2025-04-30
Payer: MEDICARE

## 2025-04-30 VITALS
SYSTOLIC BLOOD PRESSURE: 121 MMHG | HEART RATE: 50 BPM | OXYGEN SATURATION: 97 % | WEIGHT: 177.6 LBS | DIASTOLIC BLOOD PRESSURE: 65 MMHG | HEIGHT: 62 IN | BODY MASS INDEX: 32.68 KG/M2

## 2025-04-30 DIAGNOSIS — M81.0 AGE-RELATED OSTEOPOROSIS WITHOUT CURRENT PATHOLOGICAL FRACTURE: ICD-10-CM

## 2025-04-30 DIAGNOSIS — Z13.0 SCREENING FOR DEFICIENCY ANEMIA: ICD-10-CM

## 2025-04-30 DIAGNOSIS — M15.0 PRIMARY OSTEOARTHRITIS INVOLVING MULTIPLE JOINTS: ICD-10-CM

## 2025-04-30 DIAGNOSIS — E78.5 DYSLIPIDEMIA: ICD-10-CM

## 2025-04-30 DIAGNOSIS — K21.9 GASTROESOPHAGEAL REFLUX DISEASE WITHOUT ESOPHAGITIS: ICD-10-CM

## 2025-04-30 DIAGNOSIS — I10 PRIMARY HYPERTENSION: Primary | ICD-10-CM

## 2025-04-30 DIAGNOSIS — E66.811 CLASS 1 OBESITY DUE TO EXCESS CALORIES WITHOUT SERIOUS COMORBIDITY WITH BODY MASS INDEX (BMI) OF 33.0 TO 33.9 IN ADULT: ICD-10-CM

## 2025-04-30 DIAGNOSIS — R73.03 PRE-DIABETES: ICD-10-CM

## 2025-04-30 DIAGNOSIS — E03.9 ACQUIRED HYPOTHYROIDISM: ICD-10-CM

## 2025-04-30 DIAGNOSIS — E66.09 CLASS 1 OBESITY DUE TO EXCESS CALORIES WITHOUT SERIOUS COMORBIDITY WITH BODY MASS INDEX (BMI) OF 33.0 TO 33.9 IN ADULT: ICD-10-CM

## 2025-04-30 DIAGNOSIS — M85.89 OSTEOPENIA OF MULTIPLE SITES: ICD-10-CM

## 2025-04-30 PROBLEM — N18.31 STAGE 3A CHRONIC KIDNEY DISEASE (HCC): Status: RESOLVED | Noted: 2023-05-16 | Resolved: 2025-04-30

## 2025-04-30 PROCEDURE — 99214 OFFICE O/P EST MOD 30 MIN: CPT | Performed by: INTERNAL MEDICINE

## 2025-04-30 PROCEDURE — G2211 COMPLEX E/M VISIT ADD ON: HCPCS | Performed by: INTERNAL MEDICINE

## 2025-04-30 RX ORDER — TOPIRAMATE 25 MG/1
25 TABLET, FILM COATED ORAL 2 TIMES DAILY
Qty: 60 TABLET | Refills: 1 | Status: SHIPPED | OUTPATIENT
Start: 2025-04-30

## 2025-04-30 NOTE — ASSESSMENT & PLAN NOTE
PSA 12 came back normal continue with the current medication dosage of levothyroxine 25 mcg daily.  Orders:  •  TSH, 3rd generation with Free T4 reflex; Future

## 2025-04-30 NOTE — ASSESSMENT & PLAN NOTE
EGD findings noted patient is currently taking omeprazole 20 mg daily we will continue with the same watch the diet avoid eating late in the night keep the head of the bed up

## 2025-04-30 NOTE — PROGRESS NOTES
Name: Agnieszka Camejo      : 1945      MRN: [id number]  Encounter Provider: Aviva Delgado MD  Encounter Date: 2025   Encounter department: [facility]  :  Assessment & Plan  Primary hypertension  Today's blood pressure is 121/65 continue with metoprolol 50 mg half a tablet twice daily.       Class 1 obesity due to excess calories without serious comorbidity with body mass index (BMI) of 33.0 to 33.9 in adult      Patient at present time not on any injections including Wegovy her current weight is 177 pounds we will continue to monitor recommend very strongly to cut back on calorie intake diet exercise lifestyle modification to lose weight.  Orders:  •  topiramate (Topamax) 25 mg tablet; Take 1 tablet (25 mg total) by mouth 2 (two) times a day    Gastroesophageal reflux disease without esophagitis  EGD findings noted patient is currently taking omeprazole 20 mg daily we will continue with the same watch the diet avoid eating late in the night keep the head of the bed up       Acquired hypothyroidism  PSA 12 came back normal continue with the current medication dosage of levothyroxine 25 mcg daily.  Orders:  •  TSH, 3rd generation with Free T4 reflex; Future    Primary osteoarthritis involving multiple joints  Patient with osteoarthritis of both the knees status post knee replacement although she has still some discomfort feeling otherwise stable       Osteopenia of multiple sites  Patient is due for DEXA scan will order the same meanwhile patient will continue with calcium and vitamin D last DEXA scan showing osteopenia       Screening for deficiency anemia    Orders:  •  CBC and differential; Future    Dyslipidemia    Orders:  •  Lipid panel; Future    Pre-diabetes    Orders:  •  Comprehensive metabolic panel; Future  •  Hemoglobin A1C; Future  •  UA w Reflex to Microscopic w Reflex to Culture; Future    Age-related osteoporosis without current pathological fracture    Orders:  •   "DXA bone density spine hip and pelvis; Future          Depression Screening and Follow-up Plan: Patient was screened for depression during today's encounter. They screened negative with a PHQ-2 score of 0.        History of Present Illness   Patient is coming here for a follow-up evaluation with regards to the symptoms of weight gain, hypertension, GERD, DJD and osteopenia.  Patient had an upper and lower endoscopies done polyps were removed which came back benign.    Medication reviewed labs reviewed patient concerned about the weight gain she has been active in the backyard now with gardening and lawn moving.    Denies any symptoms of chest pain palpitation shortness of breath.  Complains of right shoulder pain on and off but at present time does not want any x-rays.  Symptomatic treatment      Review of Systems   Constitutional:  Negative for chills and fever.   HENT:  Negative for ear pain and sore throat.    Eyes:  Negative for pain and visual disturbance.   Respiratory:  Negative for cough and shortness of breath.    Cardiovascular:  Negative for chest pain and palpitations.   Gastrointestinal:  Negative for abdominal pain and vomiting.   Genitourinary:  Negative for dysuria and hematuria.   Musculoskeletal:  Positive for arthralgias. Negative for back pain.   Skin:  Negative for color change and rash.   Neurological:  Negative for seizures and syncope.   All other systems reviewed and are negative.      Objective   /65 (BP Location: Right arm, Patient Position: Sitting, Cuff Size: Standard)   Pulse (!) 50   Ht 5' 2\" (1.575 m)   Wt 80.6 kg (177 lb 9.6 oz)   SpO2 97%   BMI 32.48 kg/m²      Physical Exam  Vitals and nursing note reviewed.   Constitutional:       General: She is not in acute distress.     Appearance: She is well-developed.   HENT:      Head: Normocephalic and atraumatic.   Eyes:      Conjunctiva/sclera: Conjunctivae normal.   Cardiovascular:      Rate and Rhythm: Normal rate and " regular rhythm.      Heart sounds: No murmur heard.  Pulmonary:      Effort: Pulmonary effort is normal. No respiratory distress.      Breath sounds: Normal breath sounds.   Abdominal:      Palpations: Abdomen is soft.      Tenderness: There is no abdominal tenderness.   Musculoskeletal:         General: No swelling.      Cervical back: Neck supple.   Skin:     General: Skin is warm and dry.      Capillary Refill: Capillary refill takes less than 2 seconds.   Neurological:      Mental Status: She is alert and oriented to person, place, and time.   Psychiatric:         Mood and Affect: Mood normal.       Recent Results (from the past 8 weeks)   Tissue Exam    Collection Time: 04/10/25  8:12 AM   Result Value Ref Range    Case Report       Surgical Pathology Report                         Case: [id number]                                  Authorizing Provider:  Arnaldo Arizmendi MD           Collected:           04/10/2025 0812              Ordering Location:     [facility]   Received:            04/10/2025 1039                                     Center                                                                       Pathologist:           Vanessa Nagy MD                                                         Specimens:   A) - Esophagus, bx low esophagus at 33cm                                                            B) - Stomach, cold snare gastric polyp x 4                                                          C) - Colon, Descending polyp- cold snare                                                   Final Diagnosis       A. 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    Reference: Parish NJ, Hannah GW, Pino PG, Hans LB: American College of Gastroenterology.  ACG Clinical Guideline: Diagnosis and Management of Moncada's Esophagus.  Am J Gastroenterol. 2016 Jan;111(1): 30-50.    B. Stomach polyp (biopsy):  - Fragments of fundic gland polyp  - Negative for dysplasia     C. Descending colon polyp (biopsy):  - Colonic mucosa with reactive lymphoid aggregate  - Negative for dysplasia (deeper sections examined)      Interpretation performed at [facility], [address] [facility] [address]        Additional Information       All reported additional testing was performed with appropriately reactive controls.  These tests were developed and their performance characteristics determined by [facility] or appropriate performing facility, though some tests may be performed on tissues which have not been validated for performance characteristics (such as staining performed on alcohol exposed cell blocks and decalcified tissues).  Results should be interpreted with caution and in the context of the patients’ clinical condition. These tests may not be cleared or approved by the U.S. Food and Drug Administration, though the FDA has determined that such clearance or approval is not necessary. These tests are used for clinical purposes and they should not be regarded as investigational or for research. This laboratory has been approved by CLIA 88, designated as a high-complexity laboratory and is qualified to perform these tests.  .      Synoptic Checklist          COLON/RECTUM POLYP FORM - GI - C          :    Other      Gross Description       A. The specimen is received in formalin, labeled with the patient's name and hospital number, and is designated \" low esophagus at 33 cm\".  The specimen consists of 4 white-tan soft tissue fragments measuring in range of 0.2-0.4 cm in greatest dimension.  Entirely submitted. " "Screened cassette.  B. The specimen is received in formalin, labeled with the patient's name and hospital number, and is designated \" gastric polyp\".  The specimen consists of 3 tan polypoid soft tissue fragments measuring in range of 0.3-0.6 cm in greatest dimension.  Entirely submitted. Screened cassette.  C. The specimen is received in formalin, labeled with the patient's name and hospital number, and is designated \" descending colon polyp\".  The specimen consists of a single tan soft tissue fragment measuring 0.8 cm in greatest dimension.  Entirely submitted. Screened cassette.    Note: The estimated total formalin fixation time based upon information provided by the submitting clinician and the standard processing schedule is under 72 hours.    -Dinah          "

## 2025-04-30 NOTE — ASSESSMENT & PLAN NOTE
Patient at present time not on any injections including Wegovy her current weight is 177 pounds we will continue to monitor recommend very strongly to cut back on calorie intake diet exercise lifestyle modification to lose weight.  Orders:  •  topiramate (Topamax) 25 mg tablet; Take 1 tablet (25 mg total) by mouth 2 (two) times a day

## 2025-04-30 NOTE — ASSESSMENT & PLAN NOTE
Patient is due for DEXA scan will order the same meanwhile patient will continue with calcium and vitamin D last DEXA scan showing osteopenia

## 2025-04-30 NOTE — ASSESSMENT & PLAN NOTE
Patient with osteoarthritis of both the knees status post knee replacement although she has still some discomfort feeling otherwise stable

## 2025-08-14 ENCOUNTER — APPOINTMENT (OUTPATIENT)
Dept: LAB | Facility: HOSPITAL | Age: 80
End: 2025-08-14
Attending: INTERNAL MEDICINE
Payer: MEDICARE

## 2025-08-19 ENCOUNTER — TELEPHONE (OUTPATIENT)
Age: 80
End: 2025-08-19

## 2025-08-19 DIAGNOSIS — Z12.31 ENCOUNTER FOR SCREENING MAMMOGRAM FOR MALIGNANT NEOPLASM OF BREAST: Primary | ICD-10-CM

## 2025-08-19 DIAGNOSIS — Z13.820 SCREENING FOR OSTEOPOROSIS: ICD-10-CM

## 2025-08-19 DIAGNOSIS — M85.89 OSTEOPENIA OF MULTIPLE SITES: ICD-10-CM

## (undated) DEVICE — GAUZE SPONGES,16 PLY: Brand: CURITY

## (undated) DEVICE — MEDI-VAC YANKAUER SUCTION HANDLE: Brand: CARDINAL HEALTH

## (undated) DEVICE — TUBING BUBBLE CLEAR 5MM X 100 FT NS

## (undated) DEVICE — "MB-142 MOUTHPIECE": Brand: MOUTHPIECE

## (undated) DEVICE — SOLIDIFIER FLUID WASTE CONTROL 1500ML

## (undated) DEVICE — 1200CC GUARDIAN II: Brand: GUARDIAN

## (undated) DEVICE — "MH-443 SUCTION VALVE F/EVIS140 EVIS160": Brand: SUCTION VALVE

## (undated) DEVICE — 60 ML SYRINGE,REGULAR TIP: Brand: MONOJECT

## (undated) DEVICE — "MAJ-901 WATER CONTAINER SET CV-160/140": Brand: WATER CONTAINER

## (undated) DEVICE — GLOVE EXAM NON-STRL NTRL PLUS LRG PF

## (undated) DEVICE — SINGLE-USE POLYPECTOMY SNARE: Brand: SENSATION SHORT THROW

## (undated) DEVICE — "MH-438 A/W VLVE F/140 EVIS-140": Brand: AIR/WATER VALVE

## (undated) DEVICE — DISPOSABLE BIOPSY VALVE MAJ-1555: Brand: SINGLE USE BIOPSY VALVE (STERILE)

## (undated) DEVICE — TRAP POLY

## (undated) DEVICE — BITE BLOCK ENDO 60FR ADLT MAXI  DISP W/STRAP

## (undated) DEVICE — SINGLE-USE BIOPSY FORCEPS: Brand: RADIAL JAW 4

## (undated) DEVICE — BRUSH ENDO CLEANING DBL-HEADER

## (undated) DEVICE — LUBRICANT SURGILUBE TUBE 4 OZ  FLIP TOP

## (undated) DEVICE — TRAVELKIT CONTAINS FIRST STEP KIT (200ML EP-4 KIT) AND SOILED SCOPE BAG - 1 KIT: Brand: TRAVELKIT CONTAINS FIRST STEP KIT AND SOILED SCOPE BAG

## (undated) DEVICE — TUBING AUX CHANNEL

## (undated) DEVICE — AIRLIFE™  ADULT CUSHION NASAL CANNULA WITH 7 FOOT (2.1 M) CRUSH-RESISTANT OXYGEN TUBING, AND U/CONNECT-IT ADAPTER: Brand: AIRLIFE™

## (undated) DEVICE — BITE BLOCK MAXI 60FR LF STRAP